# Patient Record
Sex: FEMALE | Race: WHITE | NOT HISPANIC OR LATINO | Employment: FULL TIME | URBAN - METROPOLITAN AREA
[De-identification: names, ages, dates, MRNs, and addresses within clinical notes are randomized per-mention and may not be internally consistent; named-entity substitution may affect disease eponyms.]

---

## 2023-09-25 PROBLEM — D83.9: Status: ACTIVE | Noted: 2023-09-25

## 2023-09-25 PROBLEM — L40.0 PLAQUE PSORIASIS: Status: ACTIVE | Noted: 2023-09-25

## 2023-09-25 PROBLEM — E66.9 OBESITY: Status: ACTIVE | Noted: 2023-09-25

## 2023-09-25 PROBLEM — L40.50 ARTHRITIS WITH PSORIASIS (MULTI): Status: ACTIVE | Noted: 2023-09-25

## 2023-09-25 PROBLEM — M05.79 SEROPOSITIVE RHEUMATOID ARTHRITIS OF MULTIPLE SITES (MULTI): Status: ACTIVE | Noted: 2023-09-25

## 2023-09-25 PROBLEM — R60.9 LIPOEDEMA: Status: ACTIVE | Noted: 2023-09-25

## 2023-09-25 PROBLEM — R60.9 EDEMA: Status: ACTIVE | Noted: 2023-09-25

## 2023-09-25 PROBLEM — G62.9 NEUROPATHY: Status: ACTIVE | Noted: 2023-09-25

## 2023-09-25 PROBLEM — E55.9 VITAMIN D DEFICIENCY: Status: ACTIVE | Noted: 2023-09-25

## 2023-09-25 RX ORDER — CYCLOBENZAPRINE HCL 10 MG
10 TABLET ORAL
COMMUNITY
Start: 2023-07-05

## 2023-09-25 RX ORDER — ABATACEPT 125 MG/ML
INJECTION, SOLUTION SUBCUTANEOUS
COMMUNITY
Start: 2023-03-07 | End: 2023-10-26 | Stop reason: SDUPTHER

## 2023-09-25 RX ORDER — GUSELKUMAB 100 MG/ML
INJECTION SUBCUTANEOUS
COMMUNITY
Start: 2022-06-14 | End: 2023-10-13 | Stop reason: WASHOUT

## 2023-09-25 RX ORDER — FUROSEMIDE 20 MG/1
TABLET ORAL 3 TIMES DAILY PRN
COMMUNITY

## 2023-09-25 RX ORDER — HYDROXYCHLOROQUINE SULFATE 200 MG/1
TABLET, FILM COATED ORAL
COMMUNITY
Start: 2022-01-24 | End: 2023-10-26 | Stop reason: SDUPTHER

## 2023-09-25 RX ORDER — CHOLECALCIFEROL (VITAMIN D3) 125 MCG
CAPSULE ORAL
COMMUNITY
End: 2023-10-13 | Stop reason: ALTCHOICE

## 2023-09-25 RX ORDER — ALBUTEROL SULFATE 90 UG/1
AEROSOL, METERED RESPIRATORY (INHALATION)
COMMUNITY
End: 2023-11-14 | Stop reason: SDUPTHER

## 2023-09-25 RX ORDER — FOLIC ACID 1 MG/1
TABLET ORAL
COMMUNITY
Start: 2022-10-27

## 2023-09-25 RX ORDER — LIDOCAINE 50 MG/G
PATCH TOPICAL AS NEEDED
COMMUNITY
Start: 2023-07-05

## 2023-09-25 RX ORDER — PRIMIDONE 50 MG/1
TABLET ORAL
COMMUNITY
End: 2023-10-13 | Stop reason: ALTCHOICE

## 2023-09-25 RX ORDER — APREMILAST 30 MG/1
TABLET, FILM COATED ORAL
COMMUNITY
Start: 2022-04-29 | End: 2023-10-13 | Stop reason: SDUPTHER

## 2023-09-25 RX ORDER — SPIRONOLACTONE 25 MG/1
1 TABLET ORAL DAILY
COMMUNITY
End: 2023-10-13 | Stop reason: WASHOUT

## 2023-09-25 RX ORDER — PREDNISONE 10 MG/1
TABLET ORAL
COMMUNITY
End: 2023-10-13 | Stop reason: ALTCHOICE

## 2023-09-25 RX ORDER — CYPROHEPTADINE HYDROCHLORIDE 4 MG/1
TABLET ORAL
COMMUNITY
Start: 2014-04-16 | End: 2023-10-13 | Stop reason: WASHOUT

## 2023-09-25 RX ORDER — POTASSIUM CHLORIDE 750 MG/1
10 CAPSULE, EXTENDED RELEASE ORAL
COMMUNITY

## 2023-09-25 RX ORDER — METHYLPREDNISOLONE ACETATE 40 MG/ML
INJECTION, SUSPENSION INTRA-ARTICULAR; INTRALESIONAL; INTRAMUSCULAR; SOFT TISSUE
COMMUNITY
Start: 2014-04-16 | End: 2023-10-13 | Stop reason: ALTCHOICE

## 2023-09-25 RX ORDER — ERGOCALCIFEROL 1.25 MG/1
CAPSULE ORAL
COMMUNITY
Start: 2022-01-24 | End: 2023-10-26 | Stop reason: SDUPTHER

## 2023-09-25 RX ORDER — POTASSIUM &MAGNESIUM ASPARTATE 250-250 MG
CAPSULE ORAL DAILY PRN
COMMUNITY
Start: 2022-07-05 | End: 2023-10-13 | Stop reason: WASHOUT

## 2023-10-12 ENCOUNTER — HOSPITAL ENCOUNTER (EMERGENCY)
Facility: HOSPITAL | Age: 53
Discharge: HOME | End: 2023-10-12
Payer: COMMERCIAL

## 2023-10-12 VITALS
TEMPERATURE: 98.2 F | RESPIRATION RATE: 16 BRPM | OXYGEN SATURATION: 97 % | DIASTOLIC BLOOD PRESSURE: 74 MMHG | HEART RATE: 80 BPM | WEIGHT: 247.9 LBS | HEIGHT: 65 IN | SYSTOLIC BLOOD PRESSURE: 113 MMHG | BODY MASS INDEX: 41.3 KG/M2

## 2023-10-12 DIAGNOSIS — S81.811A SKIN TEAR OF RIGHT LOWER LEG WITHOUT COMPLICATION, INITIAL ENCOUNTER: Primary | ICD-10-CM

## 2023-10-12 PROCEDURE — 99282 EMERGENCY DEPT VISIT SF MDM: CPT

## 2023-10-12 ASSESSMENT — LIFESTYLE VARIABLES
HAVE PEOPLE ANNOYED YOU BY CRITICIZING YOUR DRINKING: NO
EVER HAD A DRINK FIRST THING IN THE MORNING TO STEADY YOUR NERVES TO GET RID OF A HANGOVER: NO
HAVE YOU EVER FELT YOU SHOULD CUT DOWN ON YOUR DRINKING: NO
EVER FELT BAD OR GUILTY ABOUT YOUR DRINKING: NO
REASON UNABLE TO ASSESS: NO

## 2023-10-12 ASSESSMENT — COLUMBIA-SUICIDE SEVERITY RATING SCALE - C-SSRS
2. HAVE YOU ACTUALLY HAD ANY THOUGHTS OF KILLING YOURSELF?: NO
6. HAVE YOU EVER DONE ANYTHING, STARTED TO DO ANYTHING, OR PREPARED TO DO ANYTHING TO END YOUR LIFE?: NO
1. IN THE PAST MONTH, HAVE YOU WISHED YOU WERE DEAD OR WISHED YOU COULD GO TO SLEEP AND NOT WAKE UP?: NO

## 2023-10-12 ASSESSMENT — PAIN - FUNCTIONAL ASSESSMENT: PAIN_FUNCTIONAL_ASSESSMENT: 0-10

## 2023-10-12 ASSESSMENT — PAIN SCALES - GENERAL: PAINLEVEL_OUTOF10: 9

## 2023-10-12 ASSESSMENT — PAIN DESCRIPTION - LOCATION: LOCATION: LEG

## 2023-10-13 ENCOUNTER — OFFICE VISIT (OUTPATIENT)
Dept: PRIMARY CARE | Facility: CLINIC | Age: 53
End: 2023-10-13
Payer: COMMERCIAL

## 2023-10-13 VITALS
SYSTOLIC BLOOD PRESSURE: 112 MMHG | TEMPERATURE: 97.5 F | DIASTOLIC BLOOD PRESSURE: 82 MMHG | HEART RATE: 67 BPM | BODY MASS INDEX: 42.02 KG/M2 | WEIGHT: 252.2 LBS | OXYGEN SATURATION: 98 % | HEIGHT: 65 IN

## 2023-10-13 DIAGNOSIS — S81.811A SKIN TEAR OF LOWER LEG WITHOUT COMPLICATION, RIGHT, INITIAL ENCOUNTER: Primary | ICD-10-CM

## 2023-10-13 PROCEDURE — 1036F TOBACCO NON-USER: CPT | Performed by: FAMILY MEDICINE

## 2023-10-13 PROCEDURE — 99213 OFFICE O/P EST LOW 20 MIN: CPT | Performed by: FAMILY MEDICINE

## 2023-10-13 ASSESSMENT — PAIN SCALES - GENERAL: PAINLEVEL: 6

## 2023-10-13 NOTE — PROGRESS NOTES
Outpatient Visit Note    Chief Complaint   Patient presents with    ER Follow-up     Skin tare.         HPI:  Sue Schafer is a 53 y.o. female with a past medical history significant for rheumatoid arthritis with plaque psoriasis and neuropathy along with vitamin D deficiency followed diligently by Dr. Leiva of rheumatology who presents to the office for ER follow-up.  She was last seen in the office on 9/15/2023 via telemedicine encounter secondary to concerns for sinus infection, at which time she was treated with antibiotics.  Augmentin.    Review of chart notes that patient presented to the [facility] emergency department on 10/12/2023 for evaluation of injury to right shin.  Stated to have bumped her leg into something causing the skin to split open on her shin.  States to have normally covered up skin tears but was concerned as injury seemed to be bleeding significantly.  She denied any other injury/trauma.  There was no reported chest pain, shortness of breath, fever, chills, nausea, vomiting, abdominal pain, recent travel or recent illness beyond her above-mentioned sinus infection.  Confirmed that last tetanus shot was within the past 5 years.  Physical exam showed stable vitals with patient in no acute distress.  They did appreciate a V-shaped skin tear on the anterior aspect of her right shin with control bleeding.  Area was cleansed with bacitracin and Steri-Strips applied.  She was ultimately discharged with recommendations for PCP follow-up.    She reports Steri-Strips to have come off with initial bathing when returning home from ER though she was given extra supplies and put these on.  Has been covering with a bandage with no recurrent bleeding or drainage.  Area remains sore.  Denies any other systemic complaints such as fever, chills.    Current Medications  Current Outpatient Medications   Medication Instructions    abatacept (Orencia ClickJect) 125 mg/mL auto-injector  auto-injection as directed Subcutaneous INJECT 125 MG SUB Q EVERY WEEK for 30 days    abatacept (ORENCIA) 125 mg/mL auto-injector auto-injection INJECT 125 MG (1 PEN) UNDER THE SKIN EVERY WEEK AS DIRECTED    albuterol (Ventolin HFA) 90 mcg/actuation inhaler 1-2 puff as needed Inhalation every 4 hrs for 30 day(s)    apremilast (Otezla) 30 mg tablet TAKE ONE (1) TABLET BY MOUTH TWICE DAILY    cetirizine (ZyrTEC) 10 mg capsule TAKE 1 CAPSULE Daily    cyclobenzaprine (Flexeril) 10 mg tablet 1 tablet at bedtime as needed Orally Once a day for 30 day(s)    ergocalciferol (Vitamin D-2) 1.25 MG (40327 UT) capsule 1 capsule po weekly for 90 days    folic acid (Folvite) 1 mg tablet 1 tablet Orally Once a day for 90 days    furosemide (Lasix) 20 mg tablet 3 times a day as needed.    hydroxychloroquine (PlaqueniL) 200 mg tablet 1 tablet Orally twice daily for 90 days    lidocaine (Lidoderm) 5 % patch 1 patch remove after 12 hours Externally Once a day for 30 days    potassium chloride ER (Micro-K) 10 mEq ER capsule Potassium Chloride ER 10 MEQ Oral Capsule Extended Release   Refills: 0       Active    raNITIdine (Zantac) 75 mg tablet 1 tablet, oral, Every 12 hours        Allergies  No Known Allergies     History reviewed. No pertinent past medical history.   Past Surgical History:   Procedure Laterality Date    HYSTERECTOMY  01/21/2014    Hysterectomy     Family History   Problem Relation Name Age of Onset    Diabetes Mother      Heart disease Mother      Rosacea Mother      Stroke Father      Multiple sclerosis Father      Other (PMR) Sister      Fibromyalgia Sister      Ovarian cancer Sister      Breast cancer Sister      Psoriasis Sister      No Known Problems Brother      No Known Problems Brother      No Known Problems Brother      No Known Problems Daughter      Other (Mental Health) Son      Breast cancer Other          grandmother     Social History     Tobacco Use    Smoking status: Never    Smokeless tobacco: Never    Substance Use Topics    Alcohol use: Never       ROS  All pertinent positive symptoms are included in the history of present illness.  All other systems have been reviewed and are negative and noncontributory to this patient's current ailments.    VITAL SIGNS  Vitals:    10/13/23 0956   BP: 112/82   Pulse: 67   Temp: 36.4 °C (97.5 °F)   SpO2: 98%       PHYSICAL EXAM  GENERAL APPEARANCE: alert and oriented, Pleasant and cooperative, No Acute Distress  HEENT: EOMI, PERRLA, MMM  HEART: RRR, normal S1S2, no murmurs, click or rubs  LUNGS: clear to auscultation bilaterally, no wheezes/rhonchi/rales  EXTREMITIES: no edema, normal ROM  SKIN: V-shaped skin tear on anterior right shin well approximated with Steri-Strips, no active bleeding/drainage, no surrounding erythema/edema  NEUROLOGIC EXAM: non-focal exam  MUSCULOSKELETAL: no gross abnormalities  PSYCH: affect is normal, eye contact is good      Assessment/Plan   Problem List Items Addressed This Visit             ICD-10-CM    Skin tear of lower leg without complication, right, initial encounter - Primary S81.811A     - Emergency department documentation reviewed and discussed  -Physical exam reassuring with recommendations to cleanse area at least once a day with warm water and mild soap  -Continue to monitor for signs of secondary infection such as redness, swelling, increased pain or drainage  -If this develops, please contact office and we can initiate oral antibiotic course

## 2023-10-13 NOTE — ASSESSMENT & PLAN NOTE
- Emergency department documentation reviewed and discussed  -Physical exam reassuring with recommendations to cleanse area at least once a day with warm water and mild soap  -Continue to monitor for signs of secondary infection such as redness, swelling, increased pain or drainage  -If this develops, please contact office and we can initiate oral antibiotic course

## 2023-10-13 NOTE — PATIENT INSTRUCTIONS
Problem List Items Addressed This Visit             ICD-10-CM    Skin tear of lower leg without complication, right, initial encounter - Primary S81.811A     - Emergency department documentation reviewed and discussed  -Physical exam reassuring with recommendations to cleanse area at least once a day with warm water and mild soap  -Continue to monitor for signs of secondary infection such as redness, swelling, increased pain or drainage  -If this develops, please contact office and we can initiate oral antibiotic course

## 2023-10-13 NOTE — ED PROVIDER NOTES
HPI   Chief Complaint   Patient presents with   • Laceration     Pt states that she was walking to her bedroom and hit her leg on a wooden box causing a laceration        Patient is a 53-year-old female presenting to the emergency department for evaluation of laceration to the right shin.  Patient states she bumped her leg on something and it split her shin open.  She normally just covers up her skin tears but was concerned about this particular cut because it was bleeding a lot.  She denies any other injuries.  She denies chest pain, shortness of breath, fever, chills, nausea, vomiting, abdominal pain, recent travel, recent illness.  She states her tetanus was updated less than 5 years ago.                          No data recorded                Patient History   History reviewed. No pertinent past medical history.  Past Surgical History:   Procedure Laterality Date   • HYSTERECTOMY  01/21/2014    Hysterectomy     Family History   Problem Relation Name Age of Onset   • Diabetes Mother     • Heart disease Mother     • Rosacea Mother     • Stroke Father     • Multiple sclerosis Father     • Other (PMR) Sister     • Fibromyalgia Sister     • Ovarian cancer Sister     • Breast cancer Sister     • Psoriasis Sister     • No Known Problems Brother     • No Known Problems Brother     • No Known Problems Brother     • No Known Problems Daughter     • Other (Mental Health) Son     • Breast cancer Other          grandmother     Social History     Tobacco Use   • Smoking status: Never   • Smokeless tobacco: Never   Substance Use Topics   • Alcohol use: Never   • Drug use: Not on file       Physical Exam   ED Triage Vitals [10/12/23 2158]   Temp Heart Rate Resp BP   36.8 °C (98.2 °F) 80 16 113/74      SpO2 Temp Source Heart Rate Source Patient Position   97 % Oral Monitor Sitting      BP Location FiO2 (%)     Right arm --       Physical Exam  GENERAL APPEARANCE: This patient is in no acute respiratory distress. Awake and  alert. Talking appropriately. Answering questions appropriately. No evidence of pressured speech.    VITAL SIGNS: As per the nurses' triage record.    HEENT: Normocephalic, atraumatic.    NECK:  full gross ROM during exam    MUSCULOSKELETAL:  Full gross active range of motion. Ambulating on own with no acute difficulties    NEUROLOGICAL: Awake, alert and oriented x 3.    IMMUNOLOGICAL: No palpable lymphadenopathy or lymphatic streaking noted on visible skin.    DERM: Skin tear to the front of the shin and a V-shaped approximately 3 cm in length.  It is very superficial and no sutures are needed.    PSYCH: mood and affect appear normal.    ED Course & MDM   Diagnoses as of 10/12/23 2234   Skin tear of right lower leg without complication, initial encounter       Medical Decision Making  Parts of this chart have been completed using voice recognition software. Please excuse any errors of transcription. Despite the medical decision making time stamp above-my medical decision making has taken place during the patient's entire visit. My thought process and reason for plan has been formulated from the time that I saw the patient until the time of disposition and is not specific to one specific moment during their visit and furthermore my MDM encompasses this entire chart and not only this text box.    Evaluated this patient independently and my supervising physician was available for consultation.    HPI: Detailed above.    Exam: A medically appropriate exam performed, outlined above, given the known history and presentation.    History obtained from: Patient    Social Determinants of Health considered during this visit: Lives at home    Considerations/further MDM:  Patient is a 53-year-old female presenting to the emergency department for evaluation of a skin tear to the right shin.  On physical exam vital signs are stable and patient is in no acute distress.  She has a V shaped skin tear anterior shin with bleeding  controlled.  Bacitracin and Steri-Strips were placed as it is very superficial and no sutures are needed.  Patient will follow-up with PCP within the next 1 to 2 days.  She will return to the emergency department any new or worsening symptoms.    Procedure  Laceration Repair    Performed by: Angelica Barraza PA-C  Authorized by: Angelica Barraza PA-C    Consent:     Consent obtained:  Verbal    Consent given by:  Patient    Risks, benefits, and alternatives were discussed: yes      Risks discussed:  Infection and pain    Alternatives discussed:  No treatment  Universal protocol:     Patient identity confirmed:  Verbally with patient  Laceration details:     Location:  Leg    Leg location:  R lower leg    Length (cm):  3  Exploration:     Hemostasis achieved with:  Direct pressure  Skin repair:     Repair method:  Steri-Strips    Number of Steri-Strips:  3  Approximation:     Approximation:  Close  Repair type:     Repair type:  Simple  Post-procedure details:     Procedure completion:  Tolerated       Angelica Barraza PA-C  10/12/23 9324

## 2023-10-14 ENCOUNTER — LAB (OUTPATIENT)
Dept: LAB | Facility: LAB | Age: 53
End: 2023-10-14
Payer: COMMERCIAL

## 2023-10-14 DIAGNOSIS — R73.9 HYPERGLYCEMIA, UNSPECIFIED: Primary | ICD-10-CM

## 2023-10-14 DIAGNOSIS — M05.79 RHEUMATOID ARTHRITIS WITH RHEUMATOID FACTOR OF MULTIPLE SITES WITHOUT ORGAN OR SYSTEMS INVOLVEMENT (MULTI): ICD-10-CM

## 2023-10-14 DIAGNOSIS — Z13.220 ENCOUNTER FOR SCREENING FOR LIPOID DISORDERS: ICD-10-CM

## 2023-10-14 DIAGNOSIS — R53.83 OTHER FATIGUE: ICD-10-CM

## 2023-10-14 DIAGNOSIS — R25.1 TREMOR, UNSPECIFIED: ICD-10-CM

## 2023-10-14 DIAGNOSIS — Z79.899 OTHER LONG TERM (CURRENT) DRUG THERAPY: ICD-10-CM

## 2023-10-14 DIAGNOSIS — L30.9 DERMATITIS, UNSPECIFIED: ICD-10-CM

## 2023-10-14 DIAGNOSIS — E55.9 VITAMIN D DEFICIENCY, UNSPECIFIED: ICD-10-CM

## 2023-10-14 LAB
25(OH)D3 SERPL-MCNC: 27 NG/ML (ref 31–100)
ALBUMIN SERPL-MCNC: 4.2 G/DL (ref 3.5–5)
ALP BLD-CCNC: 103 U/L (ref 35–125)
ALT SERPL-CCNC: 19 U/L (ref 5–40)
ANION GAP SERPL CALC-SCNC: 11 MMOL/L
AST SERPL-CCNC: 16 U/L (ref 5–40)
BASOPHILS # BLD AUTO: 0.04 X10*3/UL (ref 0–0.1)
BASOPHILS NFR BLD AUTO: 0.5 %
BILIRUB SERPL-MCNC: 0.3 MG/DL (ref 0.1–1.2)
BUN SERPL-MCNC: 13 MG/DL (ref 8–25)
C3 SERPL-MCNC: 158 MG/DL (ref 87–200)
C4 SERPL-MCNC: 22 MG/DL (ref 10–50)
CALCIUM SERPL-MCNC: 9.1 MG/DL (ref 8.5–10.4)
CHLORIDE SERPL-SCNC: 103 MMOL/L (ref 97–107)
CHOLEST SERPL-MCNC: 201 MG/DL (ref 133–200)
CHOLEST/HDLC SERPL: 3.4 {RATIO}
CK SERPL-CCNC: 49 U/L (ref 24–195)
CO2 SERPL-SCNC: 27 MMOL/L (ref 24–31)
CREAT SERPL-MCNC: 0.7 MG/DL (ref 0.4–1.6)
CRP SERPL-MCNC: 0.6 MG/DL (ref 0–2)
EOSINOPHIL # BLD AUTO: 0.11 X10*3/UL (ref 0–0.7)
EOSINOPHIL NFR BLD AUTO: 1.5 %
ERYTHROCYTE [DISTWIDTH] IN BLOOD BY AUTOMATED COUNT: 13.2 % (ref 11.5–14.5)
ERYTHROCYTE [SEDIMENTATION RATE] IN BLOOD BY WESTERGREN METHOD: 26 MM/H (ref 0–30)
EST. AVERAGE GLUCOSE BLD GHB EST-MCNC: 128 MG/DL
GFR SERPL CREATININE-BSD FRML MDRD: >90 ML/MIN/1.73M*2
GLUCOSE SERPL-MCNC: 102 MG/DL (ref 65–99)
HBA1C MFR BLD: 6.1 %
HCT VFR BLD AUTO: 42.5 % (ref 36–46)
HDLC SERPL-MCNC: 59 MG/DL
HGB BLD-MCNC: 13.6 G/DL (ref 12–16)
IMM GRANULOCYTES # BLD AUTO: 0.01 X10*3/UL (ref 0–0.7)
IMM GRANULOCYTES NFR BLD AUTO: 0.1 % (ref 0–0.9)
LDLC SERPL CALC-MCNC: 127 MG/DL (ref 65–130)
LYMPHOCYTES # BLD AUTO: 3.16 X10*3/UL (ref 1.2–4.8)
LYMPHOCYTES NFR BLD AUTO: 42.5 %
MCH RBC QN AUTO: 29.1 PG (ref 26–34)
MCHC RBC AUTO-ENTMCNC: 32 G/DL (ref 32–36)
MCV RBC AUTO: 91 FL (ref 80–100)
MONOCYTES # BLD AUTO: 0.51 X10*3/UL (ref 0.1–1)
MONOCYTES NFR BLD AUTO: 6.9 %
NEUTROPHILS # BLD AUTO: 3.6 X10*3/UL (ref 1.2–7.7)
NEUTROPHILS NFR BLD AUTO: 48.5 %
NRBC BLD-RTO: 0 /100 WBCS (ref 0–0)
PLATELET # BLD AUTO: 272 X10*3/UL (ref 150–450)
PMV BLD AUTO: 10 FL (ref 7.5–11.5)
POTASSIUM SERPL-SCNC: 4.6 MMOL/L (ref 3.4–5.1)
PROT SERPL-MCNC: 6.6 G/DL (ref 6.4–8.2)
PROT SERPL-MCNC: 6.7 G/DL (ref 5.9–7.9)
PROT SERPL-MCNC: 6.7 G/DL (ref 6.4–8.2)
RBC # BLD AUTO: 4.68 X10*6/UL (ref 4–5.2)
SODIUM SERPL-SCNC: 141 MMOL/L (ref 133–145)
TRIGL SERPL-MCNC: 73 MG/DL (ref 40–150)
TSH SERPL DL<=0.05 MIU/L-ACNC: 1.58 MIU/L (ref 0.27–4.2)
VIT B12 SERPL-MCNC: 421 PG/ML (ref 211–946)
WBC # BLD AUTO: 7.4 X10*3/UL (ref 4.4–11.3)

## 2023-10-14 PROCEDURE — 86332 IMMUNE COMPLEX ASSAY: CPT

## 2023-10-14 PROCEDURE — 83036 HEMOGLOBIN GLYCOSYLATED A1C: CPT

## 2023-10-14 PROCEDURE — 84155 ASSAY OF PROTEIN SERUM: CPT

## 2023-10-14 PROCEDURE — 84443 ASSAY THYROID STIM HORMONE: CPT

## 2023-10-14 PROCEDURE — 82550 ASSAY OF CK (CPK): CPT

## 2023-10-14 PROCEDURE — 85652 RBC SED RATE AUTOMATED: CPT

## 2023-10-14 PROCEDURE — 82607 VITAMIN B-12: CPT

## 2023-10-14 PROCEDURE — 83529 ASAY OF INTERLEUKIN-6 (IL-6): CPT

## 2023-10-14 PROCEDURE — 80053 COMPREHEN METABOLIC PANEL: CPT

## 2023-10-14 PROCEDURE — 86160 COMPLEMENT ANTIGEN: CPT

## 2023-10-14 PROCEDURE — 80061 LIPID PANEL: CPT

## 2023-10-14 PROCEDURE — 85025 COMPLETE CBC W/AUTO DIFF WBC: CPT

## 2023-10-14 PROCEDURE — 86318 IA INFECTIOUS AGENT ANTIBODY: CPT

## 2023-10-14 PROCEDURE — 82306 VITAMIN D 25 HYDROXY: CPT

## 2023-10-14 PROCEDURE — 86140 C-REACTIVE PROTEIN: CPT

## 2023-10-14 PROCEDURE — 84207 ASSAY OF VITAMIN B-6: CPT

## 2023-10-14 PROCEDURE — 86225 DNA ANTIBODY NATIVE: CPT

## 2023-10-14 PROCEDURE — 36415 COLL VENOUS BLD VENIPUNCTURE: CPT

## 2023-10-14 PROCEDURE — 89240 UNLISTED MISC PATH TEST: CPT | Performed by: INTERNAL MEDICINE

## 2023-10-15 LAB
DSDNA AB SER-ACNC: <1 IU/ML
RPR SER QL: NONREACTIVE

## 2023-10-18 ENCOUNTER — TELEPHONE (OUTPATIENT)
Dept: PRIMARY CARE | Facility: CLINIC | Age: 53
End: 2023-10-18
Payer: COMMERCIAL

## 2023-10-18 DIAGNOSIS — L03.115 CELLULITIS OF RIGHT LOWER EXTREMITY: Primary | ICD-10-CM

## 2023-10-18 LAB
IL6 SERPL-MCNC: <2 PG/ML
PYRIDOXAL PHOS SERPL-SCNC: 20.6 NMOL/L (ref 20–125)

## 2023-10-18 RX ORDER — SULFAMETHOXAZOLE AND TRIMETHOPRIM 800; 160 MG/1; MG/1
1 TABLET ORAL 2 TIMES DAILY
Qty: 20 TABLET | Refills: 0 | Status: SHIPPED | OUTPATIENT
Start: 2023-10-18 | End: 2023-10-26 | Stop reason: ALTCHOICE

## 2023-10-18 NOTE — TELEPHONE ENCOUNTER
pt came to office stating she has sent a msg to pcp via my chart, pt states she is in need of an atb, due to cut on leg, was told to watch, now is warm to touch and the redness area has spread. Pt states she did send a pic via my chart along with msg.

## 2023-10-18 NOTE — TELEPHONE ENCOUNTER
Secondary to concerns for soft tissue infection, Bactrim DS sent to pharmacy to be taken with food as directed.  Encourage patient to continue watching for any worsening/progressive/persistent signs and symptoms

## 2023-10-24 ENCOUNTER — PHARMACY VISIT (OUTPATIENT)
Dept: PHARMACY | Facility: CLINIC | Age: 53
End: 2023-10-24
Payer: COMMERCIAL

## 2023-10-24 ENCOUNTER — SPECIALTY PHARMACY (OUTPATIENT)
Dept: PHARMACY | Facility: CLINIC | Age: 53
End: 2023-10-24

## 2023-10-24 LAB
C1Q SERPL-MCNC: 14.2 MG/DL (ref 10.3–20.5)
C1Q SERPL-MCNC: 15.7 MG/DL (ref 10.3–20.5)
IC SERPL C1Q BIND-ACNC: 1.4 UG EQ/ML
IC SERPL RAJI CELL-ACNC: 5.4 UG EQ/ML
SCAN RESULT: NORMAL

## 2023-10-24 PROCEDURE — RXMED WILLOW AMBULATORY MEDICATION CHARGE

## 2023-10-24 RX ORDER — CEPHALEXIN 500 MG/1
500 CAPSULE ORAL 2 TIMES DAILY
Qty: 14 CAPSULE | Refills: 0 | Status: SHIPPED | OUTPATIENT
Start: 2023-10-24 | End: 2023-10-31

## 2023-10-24 NOTE — TELEPHONE ENCOUNTER
Patient states area is still red around the surounding skin. Patient wants to know if he should send PCP another picture of area via [employer]. Patient states she does have appmt with Rhematologist Dr. Leiva next Thursday. Please advise

## 2023-10-24 NOTE — TELEPHONE ENCOUNTER
I am more than happy to have a look at the wound if patient would like to send a picture.  Regardless, secondary to nature of wound and ongoing redness, I will send an additional alternative antibiotic course in the form of Keflex 500 mg twice a day for the next 7 days to take with food as tolerated.  If patient continues to have ongoing concerns can set up follow-up appointment or coordinate with wound care

## 2023-10-25 PROBLEM — J32.1 CHRONIC FRONTAL SINUSITIS: Status: ACTIVE | Noted: 2023-10-25

## 2023-10-25 RX ORDER — BENZONATATE 100 MG/1
100 CAPSULE ORAL 3 TIMES DAILY PRN
COMMUNITY
Start: 2023-09-15 | End: 2023-10-26 | Stop reason: ALTCHOICE

## 2023-10-25 RX ORDER — METHYLPREDNISOLONE 4 MG/1
TABLET ORAL
COMMUNITY
Start: 2023-06-24 | End: 2023-10-26 | Stop reason: ALTCHOICE

## 2023-10-25 RX ORDER — ACETAMINOPHEN 500 MG
500 TABLET ORAL EVERY 8 HOURS PRN
COMMUNITY
End: 2024-01-18 | Stop reason: WASHOUT

## 2023-10-26 ENCOUNTER — OFFICE VISIT (OUTPATIENT)
Dept: RHEUMATOLOGY | Facility: CLINIC | Age: 53
End: 2023-10-26
Payer: COMMERCIAL

## 2023-10-26 VITALS
BODY MASS INDEX: 40.29 KG/M2 | WEIGHT: 241.84 LBS | HEART RATE: 62 BPM | OXYGEN SATURATION: 98 % | SYSTOLIC BLOOD PRESSURE: 116 MMHG | DIASTOLIC BLOOD PRESSURE: 64 MMHG | HEIGHT: 65 IN

## 2023-10-26 DIAGNOSIS — M05.79 SEROPOSITIVE RHEUMATOID ARTHRITIS OF MULTIPLE SITES (MULTI): Primary | ICD-10-CM

## 2023-10-26 DIAGNOSIS — G62.9 NEUROPATHY: ICD-10-CM

## 2023-10-26 DIAGNOSIS — R76.8 POSITIVE ANA (ANTINUCLEAR ANTIBODY): ICD-10-CM

## 2023-10-26 DIAGNOSIS — L40.0 PLAQUE PSORIASIS: ICD-10-CM

## 2023-10-26 DIAGNOSIS — E55.9 VITAMIN D DEFICIENCY: ICD-10-CM

## 2023-10-26 PROCEDURE — 1036F TOBACCO NON-USER: CPT | Performed by: INTERNAL MEDICINE

## 2023-10-26 PROCEDURE — 99213 OFFICE O/P EST LOW 20 MIN: CPT | Performed by: INTERNAL MEDICINE

## 2023-10-26 RX ORDER — ERGOCALCIFEROL 1.25 MG/1
CAPSULE ORAL
Qty: 12 CAPSULE | Refills: 3 | Status: SHIPPED | OUTPATIENT
Start: 2023-10-26 | End: 2024-05-30 | Stop reason: SDUPTHER

## 2023-10-26 RX ORDER — HYDROXYCHLOROQUINE SULFATE 200 MG/1
TABLET, FILM COATED ORAL
Qty: 180 TABLET | Refills: 3 | Status: SHIPPED | OUTPATIENT
Start: 2023-10-26

## 2023-10-26 ASSESSMENT — PAIN SCALES - GENERAL: PAINLEVEL_OUTOF10: 5 - MODERATE PAIN

## 2023-10-26 ASSESSMENT — ROUTINE ASSESSMENT OF PATIENT INDEX DATA (RAPID3)
IN_OUT_TRANSPORT: WITH SOME DIFFICULTY
TOTAL RAPID3 SCORE: 14.7
FEELINGS_DEPRESSION: WITHOUT ANY DIFFICULTY
ON A SCALE OF ONE TO TEN, CONSIDERING ALL THE WAYS IN WHICH ILLNESS AND HEALTH CONDITIONS MAY AFFECT YOU AT THIS TIME, PLEASE INDICATE BELOW HOW YOU ARE DOING:: 7
GOOD_NIGHTS_SLEEP: WITHOUT ANY DIFFICULTY
TURN_FAUCETS_OFF: WITHOUT ANY DIFFICULTY
LIFT_CUP_TO_MOUTH: WITHOUT ANY DIFFICULTY
ON A SCALE OF ONE TO TEN, CONSIDERING ALL THE WAYS IN WHICH ILLNESS AND HEALTH CONDITIONS MAY AFFECT YOU AT THIS TIME, PLEASE INDICATE BELOW HOW YOU ARE DOING:: 7
ON A SCALE OF ONE TO TEN, HOW MUCH PAIN HAVE YOU HAD BECAUSE OF YOUR CONDITION OVER THE PAST WEEK?: 5
PICK_CLOTHES_OFF_FLOOR: WITHOUT ANY DIFFICULTY
PARTIPATE_RECREATIONAL_ACTIVITIES: WITH MUCH DIFFICULTY
FEELINGS_ANXIETY_NERVOUS: WITHOUT ANY DIFFICULTY
SUM OF QUESTIONS A TO J: 8
WASH_DRY_BODY: WITHOUT ANY DIFFICULTY
IN_OUT_BED: WITH SOME DIFFICULTY
FN_SCORE: 2.7
WALK_FLAT_GROUND: WITH SOME DIFFICULTY
ON A SCALE OF ONE TO TEN, HOW MUCH PAIN HAVE YOU HAD BECAUSE OF YOUR CONDITION OVER THE PAST WEEK?: 5
DRESS_YOURSELF: WITH SOME DIFFICULTY
WEIGHTED_TOTAL_SCORE: 4.9
SEVERITY_SCORE: 0
WALK_KILOMETERS: WITH MUCH DIFFICULTY

## 2023-10-26 ASSESSMENT — PATIENT HEALTH QUESTIONNAIRE - PHQ9
1. LITTLE INTEREST OR PLEASURE IN DOING THINGS: NOT AT ALL
2. FEELING DOWN, DEPRESSED OR HOPELESS: NOT AT ALL
SUM OF ALL RESPONSES TO PHQ9 QUESTIONS 1 AND 2: 0

## 2023-10-26 ASSESSMENT — PAIN - FUNCTIONAL ASSESSMENT: PAIN_FUNCTIONAL_ASSESSMENT: 0-10

## 2023-10-26 ASSESSMENT — ENCOUNTER SYMPTOMS
OCCASIONAL FEELINGS OF UNSTEADINESS: 1
LOSS OF SENSATION IN FEET: 0
DEPRESSION: 0

## 2023-10-26 NOTE — PROGRESS NOTES
[facility]/  Rheumatology  [address], [address]  [place] [address]  Phone: [phone]  Fax: [phone]    Rheumatology Progress Note 10/26/23    Sue Schafer is a 53 y.o. female here for   Chief Complaint   Patient presents with    Rheumatoid Arthritis     Follow up with labs       Last Visit: 3/6/2023    Rheum Hx      Referred by self for knee pain.  Chief complaint: Swollen hands, feet, legs, stiffness  Since February 2019  Slow onset  Intermittent remitting course  Precipitating factors: Fallen eyes  No associated symptoms  Better: Lately nothing  Worse: Walking sitting  Previous treatments:  Naproxen-somewhat helpful  Ibuprofen-somewhat helpful  Acetaminophen-somewhat helpful  Steroids-very helpful- Medrol, no side effects  Occupation: Medical assistant  Hx of fall on ice/knee trauma x21st in 2/2017, then 2/2019  Sometimes locks ups, hand locking at times  Bilateral shoulder, right hip, bilateral knees, bilateral ankle  pain  Swelling of right 5th digit and right leg  Morning stiffness until after hot shower  Hx of sciatica with pregnancy  Review of systems positive for:  Hair loss  MARIE sometimes; tightness more than SOB; last wk R  back/flank discomfort' Dx asthma in past- on inh prn with help  Lower extremity swelling by the end of the day  Sensitivity to the sun- itchy, red, swollen spots  Joint pain swelling stiffness  Weakness hands  Numbness tingling legs  Allergies, seasonal- Zyrtec helps  FHx: cousins x3- SLE  psoriasis- sister  rosacea- ronitter   1/4/2022   C-ANCA (PR3) BY EIA <0.2…    C-ANCA FLOURESCENCE Negative…    P-ANCA (MPO) BY EIA <0.2…    P-ANCA FLOURESCENCE Negative…    KRISTAL Positive…    KRISTAL Titer 1:320…    KRISTAL Pattern Nucleolar…    SSA ANTIBODIES <0.2…    SSB ANTIBODIES <0.2…    Angiotensin 1 Conv 30…    Thyroid Peroxidase (TPO) Antibody <1.0…    Citrulline Antibody, IgG 31… (H)    Centromere Ab Negative…    BULMARO-1 ANTIBODY <0.2…    RNP Antibody  <0.2…         Previous Tx  Previous treatments:  Naproxen-somewhat helpful  Ibuprofen-somewhat helpful  Acetaminophen-somewhat helpful  Steroids-very helpful- Medrol, no side effects  Toradol- very helpful  Otezla- currently on  Orencia- currently on    Health Maintenance  DXA- none  Component      Latest Ref Rng 10/27/2021   TB Result Negative…    Hepatitis B Surface AB POSITIVE…      Component      Latest Ref Rng 1/4/2022   HIV 1/2 Antigen/Antibody Screen with Reflex to Confirmation Non Reactive…    Hiv Interpretation Negative…    Hepatitis B Surface AG      NEG  NEGATIVE    Malignancy Hx- none  Immunization History   Administered Date(s) Administered    Influenza, trivalent, adjuvanted 04/01/2022, 10/28/2022    Annmarie SARS-CoV-2 Vaccination 04/01/2022          Past Medical History:   Diagnosis Date    Asthma     Dermatitis     Lower extremity edema     Plaque psoriasis     Positive KRISTAL (antinuclear antibody)     Seropositive rheumatoid arthritis of multiple sites (CMS/HCC)       Past Surgical History:   Procedure Laterality Date    BLADDER SUSPENSION      BONE BIOPSY  1996    JAW    HYSTERECTOMY  01/21/2014    Hysterectomy    WISDOM TOOTH EXTRACTION  1996      Current Outpatient Medications   Medication Sig Dispense Refill    abatacept (ORENCIA) 125 mg/mL auto-injector auto-injection INJECT 125 MG (1 PEN) UNDER THE SKIN EVERY WEEK AS DIRECTED 4 mL 11    acetaminophen (Tylenol) 500 mg tablet Take 1 tablet (500 mg) by mouth every 8 hours if needed.      apremilast (Otezla) 30 mg tablet TAKE ONE (1) TABLET BY MOUTH TWICE DAILY 60 tablet 11    cetirizine (ZyrTEC) 10 mg capsule TAKE 1 CAPSULE Daily      cyclobenzaprine (Flexeril) 10 mg tablet Take 1 tablet (10 mg) by mouth. PRN      folic acid (Folvite) 1 mg tablet 1 tablet Orally Once a day for 90 days      furosemide (Lasix) 20 mg tablet 3 times a day as needed.      lidocaine (Lidoderm) 5 % patch 1 patch remove after 12 hours Externally Once a day for 30 days       "potassium chloride ER (Micro-K) 10 mEq ER capsule Potassium Chloride ER 10 MEQ Oral Capsule Extended Release   Refills: 0       Active      raNITIdine (Zantac) 75 mg tablet Take 1 tablet (75 mg) by mouth every 12 hours.      albuterol (Ventolin HFA) 90 mcg/actuation inhaler PRN 18 g 3    ergocalciferol (Vitamin D-2) 1.25 MG (95507 UT) capsule 1 capsule po weekly for 90 days 12 capsule 3    hydroxychloroquine (PlaqueniL) 200 mg tablet 1 tablet Orally twice daily for 90 days 180 tablet 3     No current facility-administered medications for this visit.      Allergies   Allergen Reactions    Other Rash     Rash from Adhesive from Band-Aid        Vitals:    10/26/23 1300   BP: 116/64   BP Location: Left arm   Patient Position: Sitting   Pulse: 62   SpO2: 98%   Weight: 110 kg (241 lb 13.5 oz)   Height: 1.651 m (5' 5\")     Pain Assessment Pain Score: 5 - Moderate pain, Pain Location: Shoulder (left shoulder, left hip, right leg)     Rapid 3  Function Score (FN): 2.7  Pain Score (PN) (0-10): 5  Patient Global (PTGL) (0-10): 7  Rapid3 Score: 14.7  RAPID3 Weighted Score: 4.9     Workup  Component      Latest Ref Rn 10/14/2023   WBC      4.4 - 11.3 x10*3/uL 7.4    nRBC      0.0 - 0.0 /100 WBCs 0.0    RBC      4.00 - 5.20 x10*6/uL 4.68    HEMOGLOBIN      12.0 - 16.0 g/dL 13.6    HEMATOCRIT      36.0 - 46.0 % 42.5    MCV      80 - 100 fL 91    MCH      26.0 - 34.0 pg 29.1    MCHC      32.0 - 36.0 g/dL 32.0    RED CELL DISTRIBUTION WIDTH      11.5 - 14.5 % 13.2    Platelets      150 - 450 x10*3/uL 272    MEAN PLATELET VOLUME      7.5 - 11.5 fL 10.0    Neutrophils %      40.0 - 80.0 % 48.5    Immature Granulocytes %, Automated      0.0 - 0.9 % 0.1    Lymphocytes %      13.0 - 44.0 % 42.5    Monocytes %      2.0 - 10.0 % 6.9    Eosinophils %      0.0 - 6.0 % 1.5    Basophils %      0.0 - 2.0 % 0.5    Neutrophils Absolute      1.20 - 7.70 x10*3/uL 3.60    Immature Granulocytes Absolute, Automated      0.00 - 0.70 x10*3/uL 0.01  "   Lymphocytes Absolute      1.20 - 4.80 x10*3/uL 3.16    Monocytes Absolute      0.10 - 1.00 x10*3/uL 0.51    Eosinophils Absolute      0.00 - 0.70 x10*3/uL 0.11    Basophils Absolute      0.00 - 0.10 x10*3/uL 0.04    GLUCOSE      65 - 99 mg/dL 102 (H)    SODIUM      133 - 145 mmol/L 141    POTASSIUM      3.4 - 5.1 mmol/L 4.6    CHLORIDE      97 - 107 mmol/L 103    Bicarbonate      24 - 31 mmol/L 27    Blood Urea Nitrogen      8 - 25 mg/dL 13    Creatinine      0.40 - 1.60 mg/dL 0.70    EGFR      >60 mL/min/1.73m*2 >90    Calcium      8.5 - 10.4 mg/dL 9.1    Albumin      3.5 - 5.0 g/dL 4.2    Alkaline Phosphatase      35 - 125 U/L 103    Total Protein      5.9 - 7.9 g/dL 6.7    Total Protein      6.4 - 8.2 g/dL 6.7    Total Protein      6.4 - 8.2 g/dL 6.6    AST      5 - 40 U/L 16    Bilirubin Total      0.1 - 1.2 mg/dL 0.3    ALT      5 - 40 U/L 19    Anion Gap      <=19 mmol/L 11    CHOLESTEROL      133 - 200 mg/dL 201 (H)    HDL CHOLESTEROL      >50.0 mg/dL 59.0    Cholesterol/HDL Ratio      SEE COMMENT  3.4    LDL Calculated      65 - 130 mg/dL 127    TRIGLYCERIDES      40 - 150 mg/dL 73    Hemoglobin A1C      See below % 6.1 (H)    Estimated Average Glucose      Not Established mg/dL 128    C3d Immune Complexes      ug Eq/mL 5.4    Immune Complexes, C1q Binding      ug Eq/mL 1.4    Thyroid Stimulating Hormone      0.27 - 4.20 mIU/L 1.58    Sed Rate      0 - 30 mm/h 26    RPR       Nonreactive  Nonreactive    Vitamin B12      211 - 946 pg/mL 421    Vitamin B6      20.0 - 125.0 nmol/L 20.6    C1Q Complement      10.3 - 20.5 mg/dL 14.2    C1Q Complement      10.3 - 20.5 mg/dL 15.7    Vitamin D, 25-Hydroxy, Total      31 - 100 ng/mL 27 (L)    Interleukin 6      <=2.0 pg/mL <2.0    C4 Complement      10 - 50 mg/dL 22    C3 Complement      87 - 200 mg/dL 158    Creatine Kinase      24 - 195 U/L 49    C-Reactive Protein      0.00 - 2.00 mg/dL 0.60    Anti-DNA (DS)      <5.0 IU/mL <1.0    Vectra Result 13 (Low)     Assessment/Plan  1. Seropositive rheumatoid arthritis of multiple sites (CMS/HCC)    2. Plaque psoriasis    3. Vitamin D deficiency    4. Positive KRISTAL (antinuclear antibody)    5. Neuropathy       Orders Placed This Encounter   Procedures    Hepatitis C Antibody    CBC and Auto Differential    Comprehensive Metabolic Panel    Creatine Kinase    C-Reactive Protein    Sedimentation Rate    Vitamin D 25-Hydroxy,Total (for eval of Vitamin D levels)    Urinalysis with Reflex Microscopic and Culture    Lyme AB Modified 2-Tier Testing, 1st Tier    C3 Complement    C4 Complement    Anti-DNA Antibody, Double-Stranded    C1Q Complement    Heavy Metals, Blood        Since last appt, adherent and tolerating regimen.   Some help with regimen but still with some joint pain, swelling, and 1-2 hr AM stiffness.   On Bactrim 10/17 to 10/24 due to cellulitis from skin abrasion.  Pain in left hip. Cannot cross leg left to get dressed.   ROS+ fatigue, MARIE, cough, abd pain, suns sens, joint pain/swelling/stiffness, back pain.   Rapid 3 consistent with high severity  Labs reviewed  D/w pt tx options Orencia,  mg, flexerill prn, and replete vit D.  Advised of possible side effects and importance of monitoring including yearly eye exams for HCQ.    All questions answered.  Patient to follow up with primary care provider regarding all other medical issues not addressed today.     Allison Valentine MD      Patient Care Team:  Luis Carlos Mason DO as PCP - General (Family Medicine)  Allison Valentine MD as Consulting Physician (Rheumatology)

## 2023-11-01 ENCOUNTER — TELEPHONE (OUTPATIENT)
Dept: RHEUMATOLOGY | Facility: CLINIC | Age: 53
End: 2023-11-01
Payer: COMMERCIAL

## 2023-11-01 ENCOUNTER — CLINICAL SUPPORT (OUTPATIENT)
Dept: RHEUMATOLOGY | Facility: CLINIC | Age: 53
End: 2023-11-01
Payer: COMMERCIAL

## 2023-11-01 DIAGNOSIS — M05.79 SEROPOSITIVE RHEUMATOID ARTHRITIS OF MULTIPLE SITES (MULTI): Primary | ICD-10-CM

## 2023-11-01 DIAGNOSIS — M05.79 SEROPOSITIVE RHEUMATOID ARTHRITIS OF MULTIPLE SITES (MULTI): ICD-10-CM

## 2023-11-01 PROCEDURE — 2500000004 HC RX 250 GENERAL PHARMACY W/ HCPCS (ALT 636 FOR OP/ED): Performed by: INTERNAL MEDICINE

## 2023-11-01 PROCEDURE — 96372 THER/PROPH/DIAG INJ SC/IM: CPT | Performed by: INTERNAL MEDICINE

## 2023-11-01 RX ORDER — KETOROLAC TROMETHAMINE 30 MG/ML
60 INJECTION, SOLUTION INTRAMUSCULAR; INTRAVENOUS ONCE
Status: COMPLETED | OUTPATIENT
Start: 2023-11-01 | End: 2023-11-01

## 2023-11-01 RX ORDER — METHYLPREDNISOLONE ACETATE 80 MG/ML
80 INJECTION, SUSPENSION INTRA-ARTICULAR; INTRALESIONAL; INTRAMUSCULAR; SOFT TISSUE ONCE
Status: COMPLETED | OUTPATIENT
Start: 2023-11-01 | End: 2023-11-01

## 2023-11-01 RX ADMIN — KETOROLAC TROMETHAMINE 60 MG: 60 INJECTION, SOLUTION INTRAMUSCULAR at 16:39

## 2023-11-01 RX ADMIN — METHYLPREDNISOLONE ACETATE 80 MG: 80 INJECTION, SUSPENSION INTRA-ARTICULAR; INTRALESIONAL; INTRAMUSCULAR; SOFT TISSUE at 16:42

## 2023-11-01 NOTE — TELEPHONE ENCOUNTER
PT CALLED, ASKING FOR TORADOL & KENALOG INJECTIONS FOR SEVERER PAIN IN HIP ON FRONT SIDE OF HIP. CANNOT LIFT LEG STRAIGHT UP PAST  INCH.

## 2023-11-01 NOTE — TELEPHONE ENCOUNTER
Ok for depomedrol 80 mg and toradol 60 mg. Hydrate well and no oral NSAIDS for 24 hrs.     PT SCHEDULED

## 2023-11-14 DIAGNOSIS — J45.909 ASTHMA, UNSPECIFIED ASTHMA SEVERITY, UNSPECIFIED WHETHER COMPLICATED, UNSPECIFIED WHETHER PERSISTENT (HHS-HCC): Primary | ICD-10-CM

## 2023-11-14 RX ORDER — ALBUTEROL SULFATE 90 UG/1
AEROSOL, METERED RESPIRATORY (INHALATION)
Qty: 18 G | Refills: 3 | Status: SHIPPED | OUTPATIENT
Start: 2023-11-14

## 2023-11-24 ENCOUNTER — SPECIALTY PHARMACY (OUTPATIENT)
Dept: PHARMACY | Facility: CLINIC | Age: 53
End: 2023-11-24

## 2023-11-24 ENCOUNTER — PHARMACY VISIT (OUTPATIENT)
Dept: PHARMACY | Facility: CLINIC | Age: 53
End: 2023-11-24
Payer: COMMERCIAL

## 2023-11-24 PROCEDURE — RXMED WILLOW AMBULATORY MEDICATION CHARGE

## 2023-11-29 ENCOUNTER — SPECIALTY PHARMACY (OUTPATIENT)
Dept: PHARMACY | Facility: CLINIC | Age: 53
End: 2023-11-29

## 2023-12-08 DIAGNOSIS — B99.9 INFECTION: Primary | ICD-10-CM

## 2023-12-08 RX ORDER — FLUCONAZOLE 150 MG/1
TABLET ORAL
Qty: 12 TABLET | Refills: 1 | Status: SHIPPED | OUTPATIENT
Start: 2023-12-08

## 2023-12-08 RX ORDER — AMOXICILLIN AND CLAVULANATE POTASSIUM 875; 125 MG/1; MG/1
875 TABLET, FILM COATED ORAL 2 TIMES DAILY
Qty: 28 TABLET | Refills: 0 | Status: SHIPPED | OUTPATIENT
Start: 2023-12-08 | End: 2023-12-22

## 2023-12-11 PROCEDURE — 88305 TISSUE EXAM BY PATHOLOGIST: CPT

## 2023-12-11 PROCEDURE — 88305 TISSUE EXAM BY PATHOLOGIST: CPT | Performed by: DERMATOLOGY

## 2023-12-14 ENCOUNTER — LAB REQUISITION (OUTPATIENT)
Dept: LAB | Facility: HOSPITAL | Age: 53
End: 2023-12-14
Payer: COMMERCIAL

## 2023-12-14 DIAGNOSIS — D48.5 NEOPLASM OF UNCERTAIN BEHAVIOR OF SKIN: ICD-10-CM

## 2023-12-15 LAB
LABORATORY COMMENT REPORT: NORMAL
PATH REPORT.FINAL DX SPEC: NORMAL
PATH REPORT.GROSS SPEC: NORMAL
PATH REPORT.MICROSCOPIC SPEC OTHER STN: NORMAL
PATH REPORT.RELEVANT HX SPEC: NORMAL
PATH REPORT.TOTAL CANCER: NORMAL

## 2023-12-19 ENCOUNTER — SPECIALTY PHARMACY (OUTPATIENT)
Dept: PHARMACY | Facility: CLINIC | Age: 53
End: 2023-12-19

## 2023-12-19 PROCEDURE — RXMED WILLOW AMBULATORY MEDICATION CHARGE

## 2023-12-20 ENCOUNTER — PHARMACY VISIT (OUTPATIENT)
Dept: PHARMACY | Facility: CLINIC | Age: 53
End: 2023-12-20
Payer: COMMERCIAL

## 2023-12-21 ENCOUNTER — PHARMACY VISIT (OUTPATIENT)
Dept: PHARMACY | Facility: CLINIC | Age: 53
End: 2023-12-21
Payer: COMMERCIAL

## 2024-01-02 ENCOUNTER — PATIENT MESSAGE (OUTPATIENT)
Dept: PRIMARY CARE | Facility: CLINIC | Age: 54
End: 2024-01-02
Payer: COMMERCIAL

## 2024-01-02 DIAGNOSIS — K21.9 GASTROESOPHAGEAL REFLUX DISEASE, UNSPECIFIED WHETHER ESOPHAGITIS PRESENT: Primary | ICD-10-CM

## 2024-01-03 RX ORDER — OMEPRAZOLE 20 MG/1
20 CAPSULE, DELAYED RELEASE ORAL DAILY
Qty: 30 CAPSULE | Refills: 1 | Status: SHIPPED | OUTPATIENT
Start: 2024-01-03 | End: 2024-05-30 | Stop reason: SDUPTHER

## 2024-01-16 ENCOUNTER — APPOINTMENT (OUTPATIENT)
Dept: PRIMARY CARE | Facility: CLINIC | Age: 54
End: 2024-01-16
Payer: COMMERCIAL

## 2024-01-17 ENCOUNTER — SPECIALTY PHARMACY (OUTPATIENT)
Dept: PHARMACY | Facility: CLINIC | Age: 54
End: 2024-01-17

## 2024-01-17 PROCEDURE — RXMED WILLOW AMBULATORY MEDICATION CHARGE

## 2024-01-18 ENCOUNTER — OFFICE VISIT (OUTPATIENT)
Dept: PRIMARY CARE | Facility: CLINIC | Age: 54
End: 2024-01-18
Payer: COMMERCIAL

## 2024-01-18 ENCOUNTER — PHARMACY VISIT (OUTPATIENT)
Dept: PHARMACY | Facility: CLINIC | Age: 54
End: 2024-01-18
Payer: COMMERCIAL

## 2024-01-18 VITALS
OXYGEN SATURATION: 97 % | WEIGHT: 250.2 LBS | TEMPERATURE: 97 F | SYSTOLIC BLOOD PRESSURE: 106 MMHG | BODY MASS INDEX: 41.69 KG/M2 | HEIGHT: 65 IN | HEART RATE: 74 BPM | DIASTOLIC BLOOD PRESSURE: 82 MMHG

## 2024-01-18 DIAGNOSIS — R41.840 ATTENTION AND CONCENTRATION DEFICIT: Primary | ICD-10-CM

## 2024-01-18 PROCEDURE — 99213 OFFICE O/P EST LOW 20 MIN: CPT | Performed by: FAMILY MEDICINE

## 2024-01-18 PROCEDURE — 1036F TOBACCO NON-USER: CPT | Performed by: FAMILY MEDICINE

## 2024-01-18 RX ORDER — AMMONIUM LACTATE 12 G/100G
LOTION TOPICAL
COMMUNITY
Start: 2024-01-09 | End: 2024-05-30 | Stop reason: WASHOUT

## 2024-01-18 RX ORDER — HYDROXYZINE HYDROCHLORIDE 25 MG/1
TABLET, FILM COATED ORAL AS NEEDED
COMMUNITY

## 2024-01-18 ASSESSMENT — PAIN SCALES - GENERAL: PAINLEVEL: 5

## 2024-01-18 ASSESSMENT — PATIENT HEALTH QUESTIONNAIRE - PHQ9
SUM OF ALL RESPONSES TO PHQ9 QUESTIONS 1 AND 2: 0
2. FEELING DOWN, DEPRESSED OR HOPELESS: NOT AT ALL
1. LITTLE INTEREST OR PLEASURE IN DOING THINGS: NOT AT ALL

## 2024-01-18 NOTE — PATIENT INSTRUCTIONS
Problem List Items Addressed This Visit             ICD-10-CM    Attention and concentration deficit - Primary R41.840     - Secondary to expressed concerns, would recommend formal assessment to which I have given referral    [facility] Zainab Augustin  Call to schedule at [phone]         Relevant Orders    Referral to Psychiatry

## 2024-01-18 NOTE — ASSESSMENT & PLAN NOTE
- Secondary to expressed concerns, would recommend formal assessment to which I have given referral    [facility] Zainab Augustin  Call to schedule at [phone]

## 2024-01-18 NOTE — PROGRESS NOTES
"       Outpatient Visit Note    Chief Complaint   Patient presents with    ADHD     Discuss options for possible ADHD or ADD.          HPI:  Sue Schafer is a 53 y.o. female with a past medical history significant for rheumatoid arthritis with plaque psoriasis and neuropathy along with vitamin D deficiency followed diligently by Dr. Leiva of rheumatology who presents to the office for current concerns regarding concentration difficulties.  She was last seen in the office on 10/13/2023 for ER follow-up.    She reports longstanding struggles with attention/focus though no formal assessment or diagnosis.  Does have difficulty completing tasks, often \"overthinking\" what needs to be done.  This did previously create academic issues to which she traditionally had an IEP in school.  Does admit to a strong family history of mood conditions/ADHD.  Expresses interest in obtaining consultation    Current Medications  Current Outpatient Medications   Medication Instructions    abatacept (ORENCIA) 125 mg/mL auto-injector auto-injection INJECT 125 MG (1 PEN) UNDER THE SKIN EVERY WEEK AS DIRECTED    albuterol (Ventolin HFA) 90 mcg/actuation inhaler PRN    ammonium lactate (Lac-Hydrin) 12 % lotion APPLY 1 APPLICATION TO AFFECTED AREAS TWICE A DAY    apremilast (Otezla) 30 mg tablet TAKE ONE (1) TABLET BY MOUTH TWICE DAILY    cetirizine (ZyrTEC) 10 mg capsule TAKE 1 CAPSULE Daily    cyclobenzaprine (Flexeril) 10 mg tablet Take 1 tablet (10 mg) by mouth. PRN    ergocalciferol (Vitamin D-2) 1.25 MG (27238 UT) capsule 1 capsule po weekly for 90 days    fluconazole (Diflucan) 150 mg tablet Monday, Wednesday, Friday as needed for yeast infection.    folic acid (Folvite) 1 mg tablet 1 tablet Orally Once a day for 90 days    furosemide (Lasix) 20 mg tablet 3 times a day as needed.    hydroxychloroquine (PlaqueniL) 200 mg tablet 1 tablet Orally twice daily for 90 days    hydrOXYzine HCL (Atarax) 25 mg tablet oral, As needed    " lidocaine (Lidoderm) 5 % patch 1 patch remove after 12 hours Externally Once a day for 30 days    omeprazole (PRILOSEC) 20 mg, oral, Daily, Do not crush or chew.    potassium chloride ER (Micro-K) 10 mEq ER capsule Potassium Chloride ER 10 MEQ Oral Capsule Extended Release   Refills: 0       Active        Allergies  Allergies   Allergen Reactions    Other Rash     Rash from Adhesive from Band-Aid        Past Medical History:   Diagnosis Date    Asthma     Dermatitis     GERD (gastroesophageal reflux disease)     Lower extremity edema     Plaque psoriasis     Positive KRISTAL (antinuclear antibody)     Seropositive rheumatoid arthritis of multiple sites (CMS/HCC)       Past Surgical History:   Procedure Laterality Date    BLADDER SUSPENSION      BONE BIOPSY      JAW    HYSTERECTOMY  2014    Hysterectomy    WISDOM TOOTH EXTRACTION       Family History   Problem Relation Name Age of Onset    Diabetes Mother Meg     Heart disease Mother Meg         Stent    Rosacea Mother Meg     Miscarriages / Stillbirths Mother Meg      labor Mother Meg     Stroke Father Garza sr.     Multiple sclerosis Father Garza sr.     Alcohol abuse Father Garza sr.     Hypertension Father Garza sr.     Other (PMR) Sister Emily     Fibromyalgia Sister Emily     Arthritis Sister Emily      labor Sister Emily     Ovarian cancer Sister Maryjo     Breast cancer Sister Ying      labor Sister Ying     Psoriasis Sister      Crohn's disease Sister      Multiple sclerosis Sister      Polymyalgia rheumatica Brother Ray     Anesthesia related problems Brother Ray     Multiple sclerosis Brother Garza     Bipolar disorder Brother Garza     Asthma Brother Garza     Multiple sclerosis Brother      Congenital heart disease Daughter      Other (Mental Health) Son Ti     Learning disabilities Son Ti     Mental illness Son Ti     Breast cancer Maternal Grandmother Gracia     Asthma Maternal  Grandmother Gracia     Diabetes Maternal Grandmother Gracia     Emphysema Maternal Grandmother Gracia     Heart attack Maternal Grandmother Gracia     Heart disease Maternal Grandmother Gracia     Diabetes Maternal Grandfather Jayden     Kidney disease Maternal Grandfather Jayden     Breast cancer Other          grandmother     Social History     Tobacco Use    Smoking status: Never    Smokeless tobacco: Never   Vaping Use    Vaping Use: Never used   Substance Use Topics    Alcohol use: Not Currently     Comment: Maybe one evry three months    Drug use: Never       ROS  All pertinent positive symptoms are included in the history of present illness.  All other systems have been reviewed and are negative and noncontributory to this patient's current ailments.    VITAL SIGNS  Vitals:    01/18/24 0811   BP: 106/82   Pulse: 74   Temp: 36.1 °C (97 °F)   SpO2: 97%         PHYSICAL EXAM  GENERAL APPEARANCE: alert and oriented, Pleasant and cooperative, No Acute Distress  HEENT: EOMI, PERRLA, MMM  EXTREMITIES: no edema, normal ROM  NEUROLOGIC EXAM: non-focal exam  MUSCULOSKELETAL: no gross abnormalities  PSYCH: affect is normal, eye contact is good      Assessment/Plan   Problem List Items Addressed This Visit             ICD-10-CM    Attention and concentration deficit - Primary R41.840     - Secondary to expressed concerns, would recommend formal assessment to which I have given referral    [facility] [place]  Call to schedule at [phone]         Relevant Orders    Referral to Psychiatry

## 2024-01-27 DIAGNOSIS — B99.9 INFECTION: Primary | ICD-10-CM

## 2024-01-27 RX ORDER — AMOXICILLIN AND CLAVULANATE POTASSIUM 875; 125 MG/1; MG/1
875 TABLET, FILM COATED ORAL 2 TIMES DAILY
Qty: 28 TABLET | Refills: 0 | Status: SHIPPED | OUTPATIENT
Start: 2024-01-27 | End: 2024-02-10

## 2024-02-02 ENCOUNTER — TELEPHONE (OUTPATIENT)
Dept: RHEUMATOLOGY | Facility: CLINIC | Age: 54
End: 2024-02-02
Payer: COMMERCIAL

## 2024-02-02 ENCOUNTER — CLINICAL SUPPORT (OUTPATIENT)
Dept: RHEUMATOLOGY | Facility: CLINIC | Age: 54
End: 2024-02-02
Payer: COMMERCIAL

## 2024-02-02 DIAGNOSIS — M05.79 SEROPOSITIVE RHEUMATOID ARTHRITIS OF MULTIPLE SITES (MULTI): Primary | ICD-10-CM

## 2024-02-02 PROCEDURE — 2500000004 HC RX 250 GENERAL PHARMACY W/ HCPCS (ALT 636 FOR OP/ED): Performed by: INTERNAL MEDICINE

## 2024-02-02 PROCEDURE — 96372 THER/PROPH/DIAG INJ SC/IM: CPT | Performed by: INTERNAL MEDICINE

## 2024-02-02 RX ORDER — METHYLPREDNISOLONE ACETATE 80 MG/ML
80 INJECTION, SUSPENSION INTRA-ARTICULAR; INTRALESIONAL; INTRAMUSCULAR; SOFT TISSUE ONCE
Status: COMPLETED | OUTPATIENT
Start: 2024-02-02 | End: 2024-02-02

## 2024-02-02 RX ORDER — KETOROLAC TROMETHAMINE 30 MG/ML
60 INJECTION, SOLUTION INTRAMUSCULAR; INTRAVENOUS ONCE
Status: COMPLETED | OUTPATIENT
Start: 2024-02-02 | End: 2024-02-02

## 2024-02-02 RX ADMIN — METHYLPREDNISOLONE ACETATE 80 MG: 80 INJECTION, SUSPENSION INTRA-ARTICULAR; INTRALESIONAL; INTRAMUSCULAR; SOFT TISSUE at 15:20

## 2024-02-02 RX ADMIN — KETOROLAC TROMETHAMINE 60 MG: 30 INJECTION, SOLUTION INTRAMUSCULAR at 15:17

## 2024-02-02 NOTE — TELEPHONE ENCOUNTER
PT IS HAVING SWELLING AROUNG LEFT WRIST AFFECTING THE LITTLE FINGER & HAND. C/O RT ANKLE & FOOT SWELLING & ANKLE JOINT FEELS UNSTABLE. PT REQUESTING DEPOMEDROL & TORADOL INJECTIONS

## 2024-02-06 ENCOUNTER — SPECIALTY PHARMACY (OUTPATIENT)
Dept: PHARMACY | Facility: CLINIC | Age: 54
End: 2024-02-06

## 2024-02-06 PROCEDURE — RXMED WILLOW AMBULATORY MEDICATION CHARGE

## 2024-02-07 ENCOUNTER — PHARMACY VISIT (OUTPATIENT)
Dept: PHARMACY | Facility: CLINIC | Age: 54
End: 2024-02-07
Payer: COMMERCIAL

## 2024-02-07 PROCEDURE — RXMED WILLOW AMBULATORY MEDICATION CHARGE

## 2024-02-18 ENCOUNTER — SPECIALTY PHARMACY (OUTPATIENT)
Dept: PHARMACY | Facility: CLINIC | Age: 54
End: 2024-02-18

## 2024-03-06 DIAGNOSIS — M05.79 SEROPOSITIVE RHEUMATOID ARTHRITIS OF MULTIPLE SITES (MULTI): Primary | ICD-10-CM

## 2024-03-06 PROCEDURE — RXMED WILLOW AMBULATORY MEDICATION CHARGE

## 2024-03-07 RX ORDER — ABATACEPT 125 MG/ML
INJECTION, SOLUTION SUBCUTANEOUS
Qty: 4 ML | Refills: 11 | Status: SHIPPED | OUTPATIENT
Start: 2024-03-07 | End: 2025-03-07

## 2024-03-08 PROCEDURE — RXMED WILLOW AMBULATORY MEDICATION CHARGE

## 2024-03-14 ENCOUNTER — TELEPHONE (OUTPATIENT)
Dept: RHEUMATOLOGY | Facility: CLINIC | Age: 54
End: 2024-03-14
Payer: COMMERCIAL

## 2024-03-14 ENCOUNTER — CLINICAL SUPPORT (OUTPATIENT)
Dept: RHEUMATOLOGY | Facility: CLINIC | Age: 54
End: 2024-03-14
Payer: COMMERCIAL

## 2024-03-14 DIAGNOSIS — M05.79 SEROPOSITIVE RHEUMATOID ARTHRITIS OF MULTIPLE SITES (MULTI): ICD-10-CM

## 2024-03-14 DIAGNOSIS — M05.79 SEROPOSITIVE RHEUMATOID ARTHRITIS OF MULTIPLE SITES (MULTI): Primary | ICD-10-CM

## 2024-03-14 PROCEDURE — 2500000004 HC RX 250 GENERAL PHARMACY W/ HCPCS (ALT 636 FOR OP/ED): Performed by: INTERNAL MEDICINE

## 2024-03-14 PROCEDURE — 96372 THER/PROPH/DIAG INJ SC/IM: CPT | Performed by: INTERNAL MEDICINE

## 2024-03-14 RX ORDER — KETOROLAC TROMETHAMINE 30 MG/ML
60 INJECTION, SOLUTION INTRAMUSCULAR; INTRAVENOUS ONCE
Status: COMPLETED | OUTPATIENT
Start: 2024-03-14 | End: 2024-03-14

## 2024-03-14 RX ORDER — METHYLPREDNISOLONE ACETATE 80 MG/ML
80 INJECTION, SUSPENSION INTRA-ARTICULAR; INTRALESIONAL; INTRAMUSCULAR; SOFT TISSUE ONCE
Status: COMPLETED | OUTPATIENT
Start: 2024-03-14 | End: 2024-03-14

## 2024-03-14 RX ADMIN — KETOROLAC TROMETHAMINE 60 MG: 60 INJECTION, SOLUTION INTRAMUSCULAR at 13:34

## 2024-03-14 RX ADMIN — METHYLPREDNISOLONE ACETATE 80 MG: 80 INJECTION, SUSPENSION INTRA-ARTICULAR; INTRALESIONAL; INTRAMUSCULAR; SOFT TISSUE at 13:37

## 2024-03-14 NOTE — TELEPHONE ENCOUNTER
PT IN FLARE WITH PAIN IN HANDS, ELBOW, & ANKLES. HANDS FEEL LIKE SHE CANNOT MAKE A FIST. ASKING IF SHE COULD GET DEPOMEDROL INJECTION & DOES SHE NEED TORADOL INJECTION ALSO? PLEASE ADVISE

## 2024-03-15 ENCOUNTER — PHARMACY VISIT (OUTPATIENT)
Dept: PHARMACY | Facility: CLINIC | Age: 54
End: 2024-03-15
Payer: COMMERCIAL

## 2024-03-18 ENCOUNTER — PHARMACY VISIT (OUTPATIENT)
Dept: PHARMACY | Facility: CLINIC | Age: 54
End: 2024-03-18
Payer: COMMERCIAL

## 2024-03-21 DIAGNOSIS — M05.79 SEROPOSITIVE RHEUMATOID ARTHRITIS OF MULTIPLE SITES (MULTI): Primary | ICD-10-CM

## 2024-03-21 RX ORDER — KETOROLAC TROMETHAMINE 10 MG/1
10 TABLET, FILM COATED ORAL EVERY 6 HOURS PRN
Qty: 20 TABLET | Refills: 0 | Status: SHIPPED | OUTPATIENT
Start: 2024-03-21 | End: 2024-03-26

## 2024-03-27 ENCOUNTER — LAB (OUTPATIENT)
Dept: LAB | Facility: LAB | Age: 54
End: 2024-03-27
Payer: COMMERCIAL

## 2024-03-27 DIAGNOSIS — E55.9 VITAMIN D DEFICIENCY: ICD-10-CM

## 2024-03-27 DIAGNOSIS — R76.8 POSITIVE ANA (ANTINUCLEAR ANTIBODY): ICD-10-CM

## 2024-03-27 DIAGNOSIS — G62.9 NEUROPATHY: ICD-10-CM

## 2024-03-27 DIAGNOSIS — M05.79 SEROPOSITIVE RHEUMATOID ARTHRITIS OF MULTIPLE SITES (MULTI): ICD-10-CM

## 2024-03-27 LAB
25(OH)D3 SERPL-MCNC: 20 NG/ML (ref 31–100)
ALBUMIN SERPL-MCNC: 4.3 G/DL (ref 3.5–5)
ALP BLD-CCNC: 105 U/L (ref 35–125)
ALT SERPL-CCNC: 18 U/L (ref 5–40)
ANION GAP SERPL CALC-SCNC: 13 MMOL/L
APPEARANCE UR: CLEAR
AST SERPL-CCNC: 16 U/L (ref 5–40)
BASOPHILS # BLD AUTO: 0.09 X10*3/UL (ref 0–0.1)
BASOPHILS NFR BLD AUTO: 0.9 %
BILIRUB SERPL-MCNC: 0.4 MG/DL (ref 0.1–1.2)
BILIRUB UR STRIP.AUTO-MCNC: NEGATIVE MG/DL
BUN SERPL-MCNC: 14 MG/DL (ref 8–25)
CALCIUM SERPL-MCNC: 9.3 MG/DL (ref 8.5–10.4)
CHLORIDE SERPL-SCNC: 101 MMOL/L (ref 97–107)
CK SERPL-CCNC: 63 U/L (ref 24–195)
CO2 SERPL-SCNC: 25 MMOL/L (ref 24–31)
COLOR UR: YELLOW
CREAT SERPL-MCNC: 0.7 MG/DL (ref 0.4–1.6)
CRP SERPL-MCNC: 1 MG/DL (ref 0–2)
EGFRCR SERPLBLD CKD-EPI 2021: >90 ML/MIN/1.73M*2
EOSINOPHIL # BLD AUTO: 0.06 X10*3/UL (ref 0–0.7)
EOSINOPHIL NFR BLD AUTO: 0.6 %
ERYTHROCYTE [DISTWIDTH] IN BLOOD BY AUTOMATED COUNT: 13.2 % (ref 11.5–14.5)
ERYTHROCYTE [SEDIMENTATION RATE] IN BLOOD BY WESTERGREN METHOD: 18 MM/H (ref 0–30)
GLUCOSE SERPL-MCNC: 207 MG/DL (ref 65–99)
GLUCOSE UR STRIP.AUTO-MCNC: NORMAL MG/DL
HCT VFR BLD AUTO: 41.6 % (ref 36–46)
HGB BLD-MCNC: 13 G/DL (ref 12–16)
IMM GRANULOCYTES # BLD AUTO: 0.03 X10*3/UL (ref 0–0.7)
IMM GRANULOCYTES NFR BLD AUTO: 0.3 % (ref 0–0.9)
KETONES UR STRIP.AUTO-MCNC: NEGATIVE MG/DL
LEUKOCYTE ESTERASE UR QL STRIP.AUTO: NEGATIVE
LYMPHOCYTES # BLD AUTO: 3.18 X10*3/UL (ref 1.2–4.8)
LYMPHOCYTES NFR BLD AUTO: 30.4 %
MCH RBC QN AUTO: 28.4 PG (ref 26–34)
MCHC RBC AUTO-ENTMCNC: 31.3 G/DL (ref 32–36)
MCV RBC AUTO: 91 FL (ref 80–100)
MONOCYTES # BLD AUTO: 0.52 X10*3/UL (ref 0.1–1)
MONOCYTES NFR BLD AUTO: 5 %
NEUTROPHILS # BLD AUTO: 6.59 X10*3/UL (ref 1.2–7.7)
NEUTROPHILS NFR BLD AUTO: 62.8 %
NITRITE UR QL STRIP.AUTO: NEGATIVE
NRBC BLD-RTO: 0 /100 WBCS (ref 0–0)
PH UR STRIP.AUTO: 6 [PH]
PLATELET # BLD AUTO: 289 X10*3/UL (ref 150–450)
POTASSIUM SERPL-SCNC: 4 MMOL/L (ref 3.4–5.1)
PROT SERPL-MCNC: 6.8 G/DL (ref 5.9–7.9)
PROT UR STRIP.AUTO-MCNC: NEGATIVE MG/DL
RBC # BLD AUTO: 4.57 X10*6/UL (ref 4–5.2)
RBC # UR STRIP.AUTO: NEGATIVE /UL
SODIUM SERPL-SCNC: 139 MMOL/L (ref 133–145)
SP GR UR STRIP.AUTO: 1.02
UROBILINOGEN UR STRIP.AUTO-MCNC: NORMAL MG/DL
WBC # BLD AUTO: 10.5 X10*3/UL (ref 4.4–11.3)

## 2024-03-27 PROCEDURE — 86160 COMPLEMENT ANTIGEN: CPT

## 2024-03-27 PROCEDURE — 82306 VITAMIN D 25 HYDROXY: CPT

## 2024-03-27 PROCEDURE — 86225 DNA ANTIBODY NATIVE: CPT

## 2024-03-27 PROCEDURE — 36415 COLL VENOUS BLD VENIPUNCTURE: CPT

## 2024-03-27 PROCEDURE — 85652 RBC SED RATE AUTOMATED: CPT

## 2024-03-27 PROCEDURE — 86803 HEPATITIS C AB TEST: CPT

## 2024-03-27 PROCEDURE — 82550 ASSAY OF CK (CPK): CPT

## 2024-03-27 PROCEDURE — 81003 URINALYSIS AUTO W/O SCOPE: CPT

## 2024-03-27 PROCEDURE — 86618 LYME DISEASE ANTIBODY: CPT

## 2024-03-27 PROCEDURE — 80053 COMPREHEN METABOLIC PANEL: CPT

## 2024-03-27 PROCEDURE — 85025 COMPLETE CBC W/AUTO DIFF WBC: CPT

## 2024-03-27 PROCEDURE — 83825 ASSAY OF MERCURY: CPT

## 2024-03-27 PROCEDURE — 86140 C-REACTIVE PROTEIN: CPT

## 2024-03-28 LAB
C3 SERPL-MCNC: 165 MG/DL (ref 87–200)
C4 SERPL-MCNC: 26 MG/DL (ref 10–50)
DSDNA AB SER-ACNC: <1 IU/ML
HCV AB SER QL: NONREACTIVE
HOLD SPECIMEN: NORMAL

## 2024-03-29 DIAGNOSIS — I83.893 VARICOSE VEINS OF BOTH LEGS WITH EDEMA: Primary | ICD-10-CM

## 2024-03-29 LAB
ARSENIC BLD-MCNC: <10 UG/L
LEAD BLDV-MCNC: <2 UG/DL
MERCURY BLD-MCNC: <2.5 UG/L

## 2024-03-30 LAB — B BURGDOR.VLSE1+PEPC10 AB SER IA-ACNC: 0.14 IV

## 2024-04-01 DIAGNOSIS — M05.79 SEROPOSITIVE RHEUMATOID ARTHRITIS OF MULTIPLE SITES (MULTI): ICD-10-CM

## 2024-04-04 ENCOUNTER — TELEMEDICINE (OUTPATIENT)
Dept: PRIMARY CARE | Facility: CLINIC | Age: 54
End: 2024-04-04
Payer: COMMERCIAL

## 2024-04-04 VITALS
OXYGEN SATURATION: 100 % | SYSTOLIC BLOOD PRESSURE: 123 MMHG | TEMPERATURE: 97.7 F | HEART RATE: 76 BPM | DIASTOLIC BLOOD PRESSURE: 78 MMHG

## 2024-04-04 DIAGNOSIS — J06.9 ACUTE URI: Primary | ICD-10-CM

## 2024-04-04 PROCEDURE — 1036F TOBACCO NON-USER: CPT | Performed by: FAMILY MEDICINE

## 2024-04-04 PROCEDURE — 99214 OFFICE O/P EST MOD 30 MIN: CPT | Performed by: FAMILY MEDICINE

## 2024-04-04 RX ORDER — AZITHROMYCIN 250 MG/1
TABLET, FILM COATED ORAL
Qty: 6 TABLET | Refills: 0 | Status: SHIPPED | OUTPATIENT
Start: 2024-04-04 | End: 2024-04-09

## 2024-04-04 ASSESSMENT — ENCOUNTER SYMPTOMS
RHINORRHEA: 1
SORE THROAT: 1
COUGH: 1
DIARRHEA: 0
ABDOMINAL PAIN: 0
VOMITING: 0

## 2024-04-04 ASSESSMENT — PATIENT HEALTH QUESTIONNAIRE - PHQ9
2. FEELING DOWN, DEPRESSED OR HOPELESS: NOT AT ALL
1. LITTLE INTEREST OR PLEASURE IN DOING THINGS: NOT AT ALL
SUM OF ALL RESPONSES TO PHQ9 QUESTIONS 1 AND 2: 0

## 2024-04-04 ASSESSMENT — PAIN SCALES - GENERAL: PAINLEVEL: 6

## 2024-04-04 NOTE — PATIENT INSTRUCTIONS
Problem List Items Addressed This Visit             ICD-10-CM    Acute URI - Primary J06.9     - Given your symptoms and duration of illness, we feel that you can benefit from antibiotic coverage at this time   - A prescription for azithromycin was sent to your pharmacy, please take this medication as prescribed  - Recommend supportive care with increased fluid intake to thin secretions, Ibuprofen or Tylenol as needed for pain or fever, and steamy showers/saline nasal rinses to help clear the nasal passages   - You may consider tea with honey or a cinnamon stick as these have natural antiviral and antibiotic properties   - Call if symptoms worsen or do not improve with these treatments         Relevant Medications    azithromycin (Zithromax) 250 mg tablet       Counseling:       Medication education:         Education:  The patient is counseled regarding potential side-effects of all new medications        Understanding:  Patient expressed understanding        Adherence:  No barriers to adherence identified

## 2024-04-04 NOTE — PROGRESS NOTES
Outpatient Visit Note    Chief Complaint   Patient presents with    Nasal Congestion     Post nasal drainage x1 day.     Sore Throat    Cough       With patient's permission, this is a Telemedicine visit with audio only secondary to technical difficulties. The provider and patient participated in this telemedicine encounter.    HPI:  Sue Schafer is a 53 y.o. female with a past medical history significant for rheumatoid arthritis with plaque psoriasis and neuropathy along with vitamin D deficiency followed diligently by Dr. Leiva of rheumatology who presents to the office via telemedicine encounter for acute upper respiratory infection concerns.    She reports onset of postnasal drainage with sore throat and sinus pressure over the course of the last 48 hours.  Has been in close contact with relative who is actively being worked up for potential strep.  Does have longstanding history of sinusitis with suppressed immune system relating to rheumatologic history.  Has developed left-sided ear pain over the last 24 hours which is common with her upper respiratory/sinus symptoms.  States that her  is additionally sick and has been running a fever.  Has traditionally done well treating sinus infections with azithromycin or Augmentin that she has had 2 separate courses of Augmentin in January and December for other illnesses/UTI.    Current Medications  Current Outpatient Medications   Medication Instructions    abatacept (Orencia ClickJect) 125 mg/mL auto-injector auto-injection INJECT 125 MG (1 PEN) UNDER THE SKIN EVERY WEEK AS DIRECTED    albuterol (Ventolin HFA) 90 mcg/actuation inhaler PRN    ammonium lactate (Lac-Hydrin) 12 % lotion APPLY 1 APPLICATION TO AFFECTED AREAS TWICE A DAY    apremilast (Otezla) 30 mg tablet TAKE ONE (1) TABLET BY MOUTH TWICE DAILY    azithromycin (Zithromax) 250 mg tablet Take 2 tablets (500 mg) by mouth once daily for 1 day, THEN 1 tablet (250 mg) once daily for 4 days.  Take 2 tabs (500 mg) by mouth today, than 1 daily for 4 days..    cetirizine (ZyrTEC) 10 mg capsule 2 times a day.    cyclobenzaprine (Flexeril) 10 mg tablet Take 1 tablet (10 mg) by mouth. PRN    ergocalciferol (Vitamin D-2) 1.25 MG (90402 UT) capsule 1 capsule po weekly for 90 days    fluconazole (Diflucan) 150 mg tablet Monday, Wednesday, Friday as needed for yeast infection.    folic acid (Folvite) 1 mg tablet 1 tablet Orally Once a day for 90 days    furosemide (Lasix) 20 mg tablet 3 times a day as needed.    hydroxychloroquine (PlaqueniL) 200 mg tablet 1 tablet Orally twice daily for 90 days    hydrOXYzine HCL (Atarax) 25 mg tablet oral, As needed    lidocaine (Lidoderm) 5 % patch if needed.    omeprazole (PRILOSEC) 20 mg, oral, Daily, Do not crush or chew.    potassium chloride ER (Micro-K) 10 mEq ER capsule 1 capsule (10 mEq). Takes with water pill        Allergies  Allergies   Allergen Reactions    Other Rash     Rash from Adhesive from Band-Aid        Past Medical History:   Diagnosis Date    Allergic     Arthritis     Asthma     Dermatitis     GERD (gastroesophageal reflux disease)     Lower extremity edema     Plaque psoriasis     Positive KRISTAL (antinuclear antibody)     Seropositive rheumatoid arthritis of multiple sites (CMS/HCC)       Past Surgical History:   Procedure Laterality Date    BLADDER SUSPENSION      BONE BIOPSY  1996    JAW    HYSTERECTOMY  2014    Hysterectomy    WISDOM TOOTH EXTRACTION       Family History   Problem Relation Name Age of Onset    Diabetes Mother Meg     Heart disease Mother Meg         Stent    Rosacea Mother Meg     Miscarriages / Stillbirths Mother Meg      labor Mother Meg     Stroke Father Holland sr.     Multiple sclerosis Father Webster sr.     Alcohol abuse Father Holland sr.     Hypertension Father Webster sr.     Other (PMR) Sister Emily     Fibromyalgia Sister Emily     Arthritis Sister Emily      labor Sister Emily      Ovarian cancer Sister Maryjo     Breast cancer Sister Ying      labor Sister Ying     Psoriasis Sister      Crohn's disease Sister      Multiple sclerosis Sister      Polymyalgia rheumatica Brother Ray     Anesthesia related problems Brother Ray     Multiple sclerosis Brother Nu Mine     Bipolar disorder Brother Nu Mine     Asthma Brother Nu Mine     Multiple sclerosis Brother      Congenital heart disease Daughter      Other (Mental Health) Son Ti     Learning disabilities Son Ti     Mental illness Son Ti     Breast cancer Maternal Grandmother Gracia     Asthma Maternal Grandmother Gracia     Diabetes Maternal Grandmother Gracia     Emphysema Maternal Grandmother Gracia     Heart attack Maternal Grandmother Gracia     Heart disease Maternal Grandmother Gracia     Diabetes Maternal Grandfather Jayden     Kidney disease Maternal Grandfather Jayden     Breast cancer Other          grandmother     Social History     Tobacco Use    Smoking status: Never    Smokeless tobacco: Never   Vaping Use    Vaping Use: Never used   Substance Use Topics    Alcohol use: Not Currently     Comment: Maybe one every three months or longer    Drug use: Never       ROS  All pertinent positive symptoms are included in the history of present illness.  All other systems have been reviewed and are negative and noncontributory to this patient's current ailments.    VITAL SIGNS  Vitals:    24 1202   BP: 123/78   Pulse: 76   Temp: 36.5 °C (97.7 °F)   SpO2: 100%         PHYSICAL EXAM  No physical exam component completed secondary to telemedicine encounter      Assessment/Plan   Problem List Items Addressed This Visit             ICD-10-CM    Acute URI - Primary J06.9     - Given your symptoms and duration of illness, we feel that you can benefit from antibiotic coverage at this time   - A prescription for azithromycin was sent to your pharmacy, please take this medication as prescribed  - Recommend supportive  care with increased fluid intake to thin secretions, Ibuprofen or Tylenol as needed for pain or fever, and steamy showers/saline nasal rinses to help clear the nasal passages   - You may consider tea with honey or a cinnamon stick as these have natural antiviral and antibiotic properties   - Call if symptoms worsen or do not improve with these treatments         Relevant Medications    azithromycin (Zithromax) 250 mg tablet       Counseling:       Medication education:         Education:  The patient is counseled regarding potential side-effects of all new medications        Understanding:  Patient expressed understanding        Adherence:  No barriers to adherence identified

## 2024-04-04 NOTE — ASSESSMENT & PLAN NOTE
- Given your symptoms and duration of illness, we feel that you can benefit from antibiotic coverage at this time   - A prescription for azithromycin was sent to your pharmacy, please take this medication as prescribed  - Recommend supportive care with increased fluid intake to thin secretions, Ibuprofen or Tylenol as needed for pain or fever, and steamy showers/saline nasal rinses to help clear the nasal passages   - You may consider tea with honey or a cinnamon stick as these have natural antiviral and antibiotic properties   - Call if symptoms worsen or do not improve with these treatments

## 2024-04-06 LAB — C1Q SERPL-MCNC: 14.2 MG/DL (ref 10.3–20.5)

## 2024-04-09 ENCOUNTER — SPECIALTY PHARMACY (OUTPATIENT)
Dept: PHARMACY | Facility: CLINIC | Age: 54
End: 2024-04-09

## 2024-04-09 PROCEDURE — RXMED WILLOW AMBULATORY MEDICATION CHARGE

## 2024-04-09 PROCEDURE — 88305 TISSUE EXAM BY PATHOLOGIST: CPT | Performed by: DERMATOLOGY

## 2024-04-11 ENCOUNTER — LAB REQUISITION (OUTPATIENT)
Dept: DERMATOPATHOLOGY | Facility: CLINIC | Age: 54
End: 2024-04-11
Payer: COMMERCIAL

## 2024-04-11 ENCOUNTER — PHARMACY VISIT (OUTPATIENT)
Dept: PHARMACY | Facility: CLINIC | Age: 54
End: 2024-04-11
Payer: COMMERCIAL

## 2024-04-11 DIAGNOSIS — R20.8 OTHER DISTURBANCES OF SKIN SENSATION: ICD-10-CM

## 2024-04-11 DIAGNOSIS — D48.5 NEOPLASM OF UNCERTAIN BEHAVIOR OF SKIN: ICD-10-CM

## 2024-04-15 ENCOUNTER — APPOINTMENT (OUTPATIENT)
Dept: RHEUMATOLOGY | Facility: CLINIC | Age: 54
End: 2024-04-15
Payer: COMMERCIAL

## 2024-04-24 ENCOUNTER — TELEPHONE (OUTPATIENT)
Dept: RHEUMATOLOGY | Facility: CLINIC | Age: 54
End: 2024-04-24
Payer: COMMERCIAL

## 2024-04-24 DIAGNOSIS — L40.0 PLAQUE PSORIASIS: ICD-10-CM

## 2024-04-24 DIAGNOSIS — M05.79 SEROPOSITIVE RHEUMATOID ARTHRITIS OF MULTIPLE SITES (MULTI): ICD-10-CM

## 2024-05-03 DIAGNOSIS — L40.0 PLAQUE PSORIASIS: Primary | ICD-10-CM

## 2024-05-05 RX ORDER — APREMILAST 30 MG/1
30 TABLET, FILM COATED ORAL 2 TIMES DAILY
Qty: 60 TABLET | Refills: 11 | Status: SHIPPED | OUTPATIENT
Start: 2024-05-05 | End: 2025-05-05

## 2024-05-06 PROCEDURE — RXMED WILLOW AMBULATORY MEDICATION CHARGE

## 2024-05-07 ENCOUNTER — SPECIALTY PHARMACY (OUTPATIENT)
Dept: PHARMACY | Facility: CLINIC | Age: 54
End: 2024-05-07

## 2024-05-07 PROCEDURE — RXMED WILLOW AMBULATORY MEDICATION CHARGE

## 2024-05-09 ENCOUNTER — SPECIALTY PHARMACY (OUTPATIENT)
Dept: PHARMACY | Facility: CLINIC | Age: 54
End: 2024-05-09

## 2024-05-09 ENCOUNTER — PHARMACY VISIT (OUTPATIENT)
Dept: PHARMACY | Facility: CLINIC | Age: 54
End: 2024-05-09
Payer: COMMERCIAL

## 2024-05-09 NOTE — PROGRESS NOTES
[facility] Clinical Note    Sue Schafer is a 53 y.o. female, who is on the specialty pharmacy service of: [facility].  Sue Schafer is taking: Otezla and Orencia.     Sue was contacted on 5/9/2024.    Refer to the encounter summary report for documentation details about patient counseling and education.      Impression/Plan  Is patient high risk? (potential patients:  pregnancy, geriatric, pediatric)   None  Is laboratory follow-up needed? Per MD - already scheduled  Is a clinical intervention needed?  none  Next assessment date?  11/5/24  Additional comments: Pt was at work and unable to speak very long. 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 No questions for [facility] at this time.     Medication Adherence  The importance of adherence was discussed with the patient and they were advised to take the medication as prescribed by their provider. Sue was encouraged to call her physician's office if they have a question regarding a missed dose.     QOL/Patient Satisfaction  Rate your quality of life on scale of 1-10: -- (Pt was at work and unable to speak at length)  Rate your satisfaction with  Specialty Pharmacy on scale of 1-10: -- (Pt was at work and unable to speak at length)      Patient advised to contact the pharmacy if there are any changes to her medication list, including prescriptions, OTC medications, herbal products, or supplements. Patient was advised of [facility]’s dispensing process, refill timeline, contact information ([phone]), and patient management follow up. Patient confirmed understanding of education conducted during assessment. All patient questions and concerns were addressed to the best of my ability. Patient was encouraged to contact the specialty pharmacy with any questions or concerns.    Confirmed follow-up outreaches are properly scheduled. Reviewed goals of therapy in  the program targets.    Lito Dominguez, PharmD

## 2024-05-10 ENCOUNTER — LAB (OUTPATIENT)
Dept: LAB | Facility: LAB | Age: 54
End: 2024-05-10
Payer: COMMERCIAL

## 2024-05-10 DIAGNOSIS — M05.79 SEROPOSITIVE RHEUMATOID ARTHRITIS OF MULTIPLE SITES (MULTI): ICD-10-CM

## 2024-05-10 DIAGNOSIS — L40.0 PLAQUE PSORIASIS: ICD-10-CM

## 2024-05-10 LAB
25(OH)D3 SERPL-MCNC: 12 NG/ML (ref 30–100)
ALBUMIN SERPL BCP-MCNC: 4 G/DL (ref 3.4–5)
ALP SERPL-CCNC: 79 U/L (ref 33–110)
ALT SERPL W P-5'-P-CCNC: 16 U/L (ref 7–45)
ANION GAP SERPL CALC-SCNC: 11 MMOL/L (ref 10–20)
AST SERPL W P-5'-P-CCNC: 15 U/L (ref 9–39)
BASOPHILS # BLD AUTO: 0.06 X10*3/UL (ref 0–0.1)
BASOPHILS NFR BLD AUTO: 0.8 %
BILIRUB SERPL-MCNC: 0.4 MG/DL (ref 0–1.2)
BUN SERPL-MCNC: 10 MG/DL (ref 6–23)
C3 SERPL-MCNC: 149 MG/DL (ref 87–200)
C4 SERPL-MCNC: 22 MG/DL (ref 10–50)
CALCIUM SERPL-MCNC: 9.2 MG/DL (ref 8.6–10.3)
CHLORIDE SERPL-SCNC: 105 MMOL/L (ref 98–107)
CK SERPL-CCNC: 45 U/L (ref 0–215)
CO2 SERPL-SCNC: 29 MMOL/L (ref 21–32)
CREAT SERPL-MCNC: 0.59 MG/DL (ref 0.5–1.05)
CRP SERPL-MCNC: 0.78 MG/DL
EGFRCR SERPLBLD CKD-EPI 2021: >90 ML/MIN/1.73M*2
EOSINOPHIL # BLD AUTO: 0.13 X10*3/UL (ref 0–0.7)
EOSINOPHIL NFR BLD AUTO: 1.7 %
ERYTHROCYTE [DISTWIDTH] IN BLOOD BY AUTOMATED COUNT: 13.3 % (ref 11.5–14.5)
ERYTHROCYTE [SEDIMENTATION RATE] IN BLOOD BY WESTERGREN METHOD: 17 MM/H (ref 0–30)
GLUCOSE SERPL-MCNC: 97 MG/DL (ref 74–99)
HCT VFR BLD AUTO: 43.5 % (ref 36–46)
HGB BLD-MCNC: 13.5 G/DL (ref 12–16)
IMM GRANULOCYTES # BLD AUTO: 0.03 X10*3/UL (ref 0–0.7)
IMM GRANULOCYTES NFR BLD AUTO: 0.4 % (ref 0–0.9)
LYMPHOCYTES # BLD AUTO: 3.18 X10*3/UL (ref 1.2–4.8)
LYMPHOCYTES NFR BLD AUTO: 41.1 %
MCH RBC QN AUTO: 28.4 PG (ref 26–34)
MCHC RBC AUTO-ENTMCNC: 31 G/DL (ref 32–36)
MCV RBC AUTO: 91 FL (ref 80–100)
MONOCYTES # BLD AUTO: 0.52 X10*3/UL (ref 0.1–1)
MONOCYTES NFR BLD AUTO: 6.7 %
NEUTROPHILS # BLD AUTO: 3.81 X10*3/UL (ref 1.2–7.7)
NEUTROPHILS NFR BLD AUTO: 49.3 %
NRBC BLD-RTO: 0 /100 WBCS (ref 0–0)
PLATELET # BLD AUTO: 301 X10*3/UL (ref 150–450)
POTASSIUM SERPL-SCNC: 4.2 MMOL/L (ref 3.5–5.3)
PROT SERPL-MCNC: 6.6 G/DL (ref 6.4–8.2)
RBC # BLD AUTO: 4.76 X10*6/UL (ref 4–5.2)
SODIUM SERPL-SCNC: 141 MMOL/L (ref 136–145)
WBC # BLD AUTO: 7.7 X10*3/UL (ref 4.4–11.3)

## 2024-05-10 PROCEDURE — 86618 LYME DISEASE ANTIBODY: CPT

## 2024-05-10 PROCEDURE — 85652 RBC SED RATE AUTOMATED: CPT

## 2024-05-10 PROCEDURE — 36415 COLL VENOUS BLD VENIPUNCTURE: CPT

## 2024-05-10 PROCEDURE — 82550 ASSAY OF CK (CPK): CPT

## 2024-05-10 PROCEDURE — 82306 VITAMIN D 25 HYDROXY: CPT

## 2024-05-10 PROCEDURE — 86481 TB AG RESPONSE T-CELL SUSP: CPT

## 2024-05-10 PROCEDURE — 80053 COMPREHEN METABOLIC PANEL: CPT

## 2024-05-10 PROCEDURE — 86225 DNA ANTIBODY NATIVE: CPT

## 2024-05-10 PROCEDURE — 86160 COMPLEMENT ANTIGEN: CPT

## 2024-05-10 PROCEDURE — 85025 COMPLETE CBC W/AUTO DIFF WBC: CPT

## 2024-05-10 PROCEDURE — 86140 C-REACTIVE PROTEIN: CPT

## 2024-05-11 LAB — DSDNA AB SER-ACNC: <1 IU/ML

## 2024-05-12 LAB
NIL(NEG) CONTROL SPOT COUNT: NORMAL
PANEL A SPOT COUNT: 0
PANEL B SPOT COUNT: 1
POS CONTROL SPOT COUNT: NORMAL
T-SPOT. TB INTERPRETATION: NEGATIVE

## 2024-05-13 LAB — B BURGDOR.VLSE1+PEPC10 AB SER IA-ACNC: 0.22 IV

## 2024-05-21 LAB — SCAN RESULT: NORMAL

## 2024-05-22 ENCOUNTER — TELEMEDICINE (OUTPATIENT)
Dept: BEHAVIORAL HEALTH | Facility: CLINIC | Age: 54
End: 2024-05-22
Payer: COMMERCIAL

## 2024-05-22 DIAGNOSIS — R41.840 ATTENTION AND CONCENTRATION DEFICIT: ICD-10-CM

## 2024-05-22 DIAGNOSIS — Z13.39 ADHD (ATTENTION DEFICIT HYPERACTIVITY DISORDER) EVALUATION: ICD-10-CM

## 2024-05-22 LAB — C1Q SERPL-MCNC: 14.2 MG/DL (ref 10.3–20.5)

## 2024-05-22 PROCEDURE — 90791 PSYCH DIAGNOSTIC EVALUATION: CPT | Performed by: PSYCHOLOGIST

## 2024-05-22 PROCEDURE — 1036F TOBACCO NON-USER: CPT | Performed by: PSYCHOLOGIST
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
## 2024-05-30 ENCOUNTER — OFFICE VISIT (OUTPATIENT)
Dept: RHEUMATOLOGY | Facility: CLINIC | Age: 54
End: 2024-05-30
Payer: COMMERCIAL

## 2024-05-30 VITALS
WEIGHT: 240 LBS | SYSTOLIC BLOOD PRESSURE: 105 MMHG | HEIGHT: 65 IN | BODY MASS INDEX: 39.99 KG/M2 | DIASTOLIC BLOOD PRESSURE: 64 MMHG | HEART RATE: 61 BPM | OXYGEN SATURATION: 100 %

## 2024-05-30 DIAGNOSIS — M25.562 CHRONIC PAIN OF BOTH KNEES: ICD-10-CM

## 2024-05-30 DIAGNOSIS — K21.9 GASTROESOPHAGEAL REFLUX DISEASE, UNSPECIFIED WHETHER ESOPHAGITIS PRESENT: ICD-10-CM

## 2024-05-30 DIAGNOSIS — G89.29 CHRONIC PAIN OF BOTH KNEES: ICD-10-CM

## 2024-05-30 DIAGNOSIS — R53.83 OTHER FATIGUE: ICD-10-CM

## 2024-05-30 DIAGNOSIS — L40.0 PLAQUE PSORIASIS: ICD-10-CM

## 2024-05-30 DIAGNOSIS — R42 DIZZINESS: ICD-10-CM

## 2024-05-30 DIAGNOSIS — M25.561 CHRONIC PAIN OF BOTH KNEES: ICD-10-CM

## 2024-05-30 DIAGNOSIS — R76.8 POSITIVE ANA (ANTINUCLEAR ANTIBODY): ICD-10-CM

## 2024-05-30 DIAGNOSIS — M05.79 SEROPOSITIVE RHEUMATOID ARTHRITIS OF MULTIPLE SITES (MULTI): Primary | ICD-10-CM

## 2024-05-30 DIAGNOSIS — E55.9 VITAMIN D DEFICIENCY: ICD-10-CM

## 2024-05-30 PROCEDURE — 99214 OFFICE O/P EST MOD 30 MIN: CPT | Performed by: INTERNAL MEDICINE

## 2024-05-30 PROCEDURE — 1036F TOBACCO NON-USER: CPT | Performed by: INTERNAL MEDICINE

## 2024-05-30 RX ORDER — EPINEPHRINE 0.3 MG/.3ML
0.3 INJECTION SUBCUTANEOUS EVERY 5 MIN PRN
OUTPATIENT
Start: 2024-05-30

## 2024-05-30 RX ORDER — DIPHENHYDRAMINE HYDROCHLORIDE 50 MG/ML
50 INJECTION INTRAMUSCULAR; INTRAVENOUS AS NEEDED
OUTPATIENT
Start: 2024-05-30

## 2024-05-30 RX ORDER — ALBUTEROL SULFATE 0.83 MG/ML
3 SOLUTION RESPIRATORY (INHALATION) AS NEEDED
OUTPATIENT
Start: 2024-05-30

## 2024-05-30 RX ORDER — ERGOCALCIFEROL 1.25 MG/1
50000 CAPSULE ORAL 2 TIMES WEEKLY
Qty: 24 CAPSULE | Refills: 3 | Status: SHIPPED | OUTPATIENT
Start: 2024-05-30

## 2024-05-30 RX ORDER — OMEPRAZOLE 20 MG/1
20 CAPSULE, DELAYED RELEASE ORAL DAILY
Qty: 30 CAPSULE | Refills: 11 | Status: SHIPPED | OUTPATIENT
Start: 2024-05-30 | End: 2024-07-29

## 2024-05-30 RX ORDER — FAMOTIDINE 10 MG/ML
20 INJECTION INTRAVENOUS ONCE AS NEEDED
OUTPATIENT
Start: 2024-05-30

## 2024-05-30 ASSESSMENT — ROUTINE ASSESSMENT OF PATIENT INDEX DATA (RAPID3)
SEVERITY_SCORE: 0
DRESS_YOURSELF: WITH SOME DIFFICULTY
ON A SCALE OF ONE TO TEN, HOW MUCH PAIN HAVE YOU HAD BECAUSE OF YOUR CONDITION OVER THE PAST WEEK?: 7
FN_SCORE: 2.7
PICK_CLOTHES_OFF_FLOOR: WITHOUT ANY DIFFICULTY
PARTIPATE_RECREATIONAL_ACTIVITIES: WITH MUCH DIFFICULTY
TURN_FAUCETS_OFF: WITHOUT ANY DIFFICULTY
GOOD_NIGHTS_SLEEP: WITH SOME DIFFICULTY
ON A SCALE OF ONE TO TEN, CONSIDERING ALL THE WAYS IN WHICH ILLNESS AND HEALTH CONDITIONS MAY AFFECT YOU AT THIS TIME, PLEASE INDICATE BELOW HOW YOU ARE DOING:: 4
FEELINGS_DEPRESSION: WITHOUT ANY DIFFICULTY
WASH_DRY_BODY: WITHOUT ANY DIFFICULTY
WALK_KILOMETERS: WITH MUCH DIFFICULTY
LIFT_CUP_TO_MOUTH: WITH SOME DIFFICULTY
WEIGHTED_TOTAL_SCORE: 4.57
FEELINGS_ANXIETY_NERVOUS: WITHOUT ANY DIFFICULTY
IN_OUT_BED: WITHOUT ANY DIFFICULTY
ON A SCALE OF ONE TO TEN, HOW MUCH PAIN HAVE YOU HAD BECAUSE OF YOUR CONDITION OVER THE PAST WEEK?: 7
TOTAL RAPID3 SCORE: 13.7
ON A SCALE OF ONE TO TEN, CONSIDERING ALL THE WAYS IN WHICH ILLNESS AND HEALTH CONDITIONS MAY AFFECT YOU AT THIS TIME, PLEASE INDICATE BELOW HOW YOU ARE DOING:: 4
SEVERITY_SCORE: HIGH SEVERITY (HS)
IN_OUT_TRANSPORT: WITH SOME DIFFICULTY
WALK_FLAT_GROUND: WITH SOME DIFFICULTY
SUM OF QUESTIONS A TO J: 8

## 2024-05-30 ASSESSMENT — ENCOUNTER SYMPTOMS
OCCASIONAL FEELINGS OF UNSTEADINESS: 1
DEPRESSION: 0
LOSS OF SENSATION IN FEET: 0

## 2024-05-30 ASSESSMENT — PATIENT HEALTH QUESTIONNAIRE - PHQ9
SUM OF ALL RESPONSES TO PHQ9 QUESTIONS 1 AND 2: 0
1. LITTLE INTEREST OR PLEASURE IN DOING THINGS: NOT AT ALL
2. FEELING DOWN, DEPRESSED OR HOPELESS: NOT AT ALL

## 2024-05-30 NOTE — PROGRESS NOTES
[facility]/  Rheumatology  [address], [address]  [place] [address]  Phone: [phone]  Fax: [phone]    Rheumatology Progress Note 5/30/24    Sue Schafer is a 53 y.o. female here for   Chief Complaint   Patient presents with    Follow-up     With labs       Last Visit: 10/26/23    Rheum Hx      Referred by self for knee pain.  Chief complaint: Swollen hands, feet, legs, stiffness  Since February 2019  Slow onset  Intermittent remitting course  Precipitating factors: Fallen eyes  No associated symptoms  Better: Lately nothing  Worse: Walking sitting  Previous treatments:  Naproxen-somewhat helpful  Ibuprofen-somewhat helpful  Acetaminophen-somewhat helpful  Steroids-very helpful- Medrol, no side effects  Occupation: Medical assistant  Hx of fall on ice/knee trauma x21st in 2/2017, then 2/2019  Sometimes locks ups, hand locking at times  Bilateral shoulder, right hip, bilateral knees, bilateral ankle  pain  Swelling of right 5th digit and right leg  Morning stiffness until after hot shower  Hx of sciatica with pregnancy  Review of systems positive for:  Hair loss  MARIE sometimes; tightness more than SOB; last wk R  back/flank discomfort' Dx asthma in past- on inh prn with help  Lower extremity swelling by the end of the day  Sensitivity to the sun- itchy, red, swollen spots  Joint pain swelling stiffness  Weakness hands  Numbness tingling legs  Allergies, seasonal- Zyrtec helps  FHx: cousins x3- SLE  psoriasis- sister  rosacea- ronitter   1/4/2022   C-ANCA (PR3) BY EIA <0.2…    C-ANCA FLOURESCENCE Negative…    P-ANCA (MPO) BY EIA <0.2…    P-ANCA FLOURESCENCE Negative…    KRISTAL Positive…    KRISTAL Titer 1:320…    KRISTAL Pattern Nucleolar…    SSA ANTIBODIES <0.2…    SSB ANTIBODIES <0.2…    Angiotensin 1 Conv 30…    Thyroid Peroxidase (TPO) Antibody <1.0…    Citrulline Antibody, IgG 31… (H)    Centromere Ab Negative…    BULMARO-1 ANTIBODY <0.2…    RNP Antibody <0.2…         Previous  Tx  Previous treatments:  Naproxen-somewhat helpful  Ibuprofen-somewhat helpful  Acetaminophen-somewhat helpful  Steroids-very helpful- Medrol, no side effects  Toradol- very helpful  Otezla- currently on  Orencia- currently on    Health Maintenance  DXA- none  Component      Latest Ref Rng 10/27/2021   TB Result Negative…    Hepatitis B Surface AB POSITIVE…      Component      Latest Ref Rng 1/4/2022   HIV 1/2 Antigen/Antibody Screen with Reflex to Confirmation Non Reactive…    Hiv Interpretation Negative…    Hepatitis B Surface AG      NEG  NEGATIVE    Malignancy Hx- none  Immunization History   Administered Date(s) Administered    Influenza, trivalent, adjuvanted 04/01/2022, 10/28/2022    Annmarie SARS-CoV-2 Vaccination 04/01/2022          Past Medical History:   Diagnosis Date    Allergic     Arthritis     Asthma ([facility]-H[facility])     Dermatitis     GERD (gastroesophageal reflux disease)     Lower extremity edema     Plaque psoriasis     Positive KRISTAL (antinuclear antibody)     Seropositive rheumatoid arthritis of multiple sites (Multi)       Past Surgical History:   Procedure Laterality Date    BLADDER SUSPENSION      BONE BIOPSY  1996    JAW    HYSTERECTOMY  01/21/2014    Hysterectomy    WISDOM TOOTH EXTRACTION  1996      Current Outpatient Medications   Medication Sig Dispense Refill    abatacept (Orencia ClickJect) 125 mg/mL auto-injector auto-injection INJECT 125 MG (1 PEN) UNDER THE SKIN EVERY WEEK AS DIRECTED 4 mL 11    albuterol (Ventolin HFA) 90 mcg/actuation inhaler PRN 18 g 3    apremilast (Otezla) 30 mg tablet TAKE ONE (1) TABLET BY MOUTH TWICE DAILY 60 tablet 11    cetirizine (ZyrTEC) 10 mg capsule 2 times a day.      cyclobenzaprine (Flexeril) 10 mg tablet Take 1 tablet (10 mg) by mouth. PRN      fluconazole (Diflucan) 150 mg tablet Monday, Wednesday, Friday as needed for yeast infection. 12 tablet 1    folic acid (Folvite) 1 mg tablet 1 tablet Orally Once a day for 90 days      furosemide (Lasix) 20 mg  tablet 3 times a day as needed.      hydroxychloroquine (PlaqueniL) 200 mg tablet 1 tablet Orally twice daily for 90 days 180 tablet 3    hydrOXYzine HCL (Atarax) 25 mg tablet Take by mouth if needed for itching.      lidocaine (Lidoderm) 5 % patch if needed.      potassium chloride ER (Micro-K) 10 mEq ER capsule 1 capsule (10 mEq). Takes with water pill      ergocalciferol (Vitamin D-2) 1.25 MG (29107 UT) capsule Take 1 capsule (50,000 Units) by mouth 2 times a week. 1 capsule po weekly for 90 days 24 capsule 3    omeprazole (PriLOSEC) 20 mg DR capsule Take 1 capsule (20 mg) by mouth once daily. Do not crush or chew. 30 capsule 11     No current facility-administered medications for this visit.      Allergies   Allergen Reactions    Other Rash     Rash from Adhesive from Band-Aid        Vitals:    05/30/24 1300   Weight: 109 kg (240 lb)           Rapid 3  Function Score (FN): 2.7  Pain Score (PN) (0-10): 7  Patient Global (PTGL) (0-10): 4  Rapid3 Score: 13.7  RAPID3 Weighted Score: 4.57     Workup  Component      Latest Ref Rng 5/10/2024   LEUKOCYTES (10*3/UL) IN BLOOD BY AUTOMATED COUNT, [place]      4.4 - 11.3 x10*3/uL 7.7    nRBC      0.0 - 0.0 /100 WBCs 0.0    ERYTHROCYTES (10*6/UL) IN BLOOD BY AUTOMATED COUNT, [place]      4.00 - 5.20 x10*6/uL 4.76    HEMOGLOBIN      12.0 - 16.0 g/dL 13.5    HEMATOCRIT      36.0 - 46.0 % 43.5    MCV      80 - 100 fL 91    MCH      26.0 - 34.0 pg 28.4    MCHC      32.0 - 36.0 g/dL 31.0 (L)    RED CELL DISTRIBUTION WIDTH      11.5 - 14.5 % 13.3    PLATELETS (10*3/UL) IN BLOOD AUTOMATED COUNT, [place]      150 - 450 x10*3/uL 301    NEUTROPHILS/100 LEUKOCYTES IN BLOOD BY AUTOMATED COUNT, [place]      40.0 - 80.0 % 49.3    Immature Granulocytes %, Automated      0.0 - 0.9 % 0.4    Lymphocytes %      13.0 - 44.0 % 41.1    Monocytes %      2.0 - 10.0 % 6.7    Eosinophils %      0.0 - 6.0 % 1.7    Basophils %      0.0 - 2.0 % 0.8    NEUTROPHILS (10*3/UL) IN BLOOD BY AUTOMATED  COUNT, [place]      1.20 - 7.70 x10*3/uL 3.81    Immature Granulocytes Absolute, Automated      0.00 - 0.70 x10*3/uL 0.03    Lymphocytes Absolute      1.20 - 4.80 x10*3/uL 3.18    Monocytes Absolute      0.10 - 1.00 x10*3/uL 0.52    Eosinophils Absolute      0.00 - 0.70 x10*3/uL 0.13    Basophils Absolute      0.00 - 0.10 x10*3/uL 0.06    GLUCOSE      74 - 99 mg/dL 97    SODIUM      136 - 145 mmol/L 141    POTASSIUM      3.5 - 5.3 mmol/L 4.2    CHLORIDE      98 - 107 mmol/L 105    Bicarbonate      21 - 32 mmol/L 29    Anion Gap      10 - 20 mmol/L 11    Blood Urea Nitrogen      6 - 23 mg/dL 10    Creatinine      0.50 - 1.05 mg/dL 0.59    EGFR      >60 mL/min/1.73m*2 >90    Calcium      8.6 - 10.3 mg/dL 9.2    Albumin      3.4 - 5.0 g/dL 4.0    Alkaline Phosphatase      33 - 110 U/L 79    Total Protein      6.4 - 8.2 g/dL 6.6    AST      9 - 39 U/L 15    Bilirubin Total      0.0 - 1.2 mg/dL 0.4    ALT      7 - 45 U/L 16    T-SPOT. TB Interpretation      Negative  Negative    Panel A Spot Count 0    Panel B Spot Count 1    NIL(NEG) Control Spot Count Passed    POS Control Spot Count Passed    Scan Result Vectra 39 (moderate)   C1Q Complement      10.3 - 20.5 mg/dL 14.2    Anti-DNA (DS)      <5.0 IU/mL <1.0    C4 Complement      10 - 50 mg/dL 22    C3 Complement      87 - 200 mg/dL 149    B. burgdorferi VlsE1/pepC10 Abs, RADHA      <=0.90 IV 0.22    Vitamin D, 25-Hydroxy, Total      30 - 100 ng/mL 12 (L)    Sed Rate      0 - 30 mm/h 17    C-Reactive Protein      <1.00 mg/dL 0.78    Creatine Kinase      0 - 215 U/L 45          Assessment/Plan  1. Seropositive rheumatoid arthritis of multiple sites (Multi)    2. Plaque psoriasis    3. Positive KRISTAL (antinuclear antibody)    4. Vitamin D deficiency    5. Other fatigue    6. Gastroesophageal reflux disease, unspecified whether esophagitis present    7. Dizziness    8. Chronic pain of both knees         Orders Placed This Encounter   Procedures    XR knee 3 views bilateral     C1Q Complement    C3 Complement    C4 Complement    Anti-DNA Antibody, Double-Stranded    CBC and Auto Differential    Comprehensive Metabolic Panel    C-Reactive Protein    Creatine Kinase    Creatinine, Urine Random    Albumin , Urine Random    Iron and TIBC    Ferritin    Vitamin B12    Vectra; [facility]; [id number] - Miscellaneous Test    Urinalysis with Reflex Culture and Microscopic    Sedimentation Rate    Vitamin D 25-Hydroxy,Total (for eval of Vitamin D levels)    Referral to ENT          Since last appt, adherent and tolerating Orencia 125 mg weekly,  mg daily, and Otezla 30 mg daily.  Fingers DIPs, elbows, toes with L 2nd toe deformities  AM stiffness at least 60 min.   Difficulty raising arms  Ibuprofen and Tylenol  Second biopsy from leg showed porokeratosis  Lac Hydrin did not work  Denies any recent or current infection.  Not on any glucocorticoids.  ROS+ for fatigue, blurry vision, swollen glands/nodes, MARIE, sun sens, joint pain/swelling/stiffness, numbness/tingling,   Advised on exercise, recommend quad strengthening.  Rapid 3 consistent with high severity.  Labs reviewed  D/w pt tx options and decided on   Switch to Orencia infusion due to lack of full efficacy with current regimen.  dizziness spells positional. ?vertigo, post recurrent ear infections and on immunosuppression- recommend ENT eval- ordered.  Podiatry eval  XR knees  Continue Otezla which has been helpful.  Continue HCQ  Advised of possible side effects and importance of monitoring.   All questions answered.  Patient to follow up with primary care provider regarding all other medical issues not addressed today and for medical chart updating.     Allison Valentine MD      Patient Care Team:  Luis Carlos Mason DO as PCP - General (Family Medicine)  Luis Carlos Mason DO as PCP - Employee [employer] PCP  Allison Valentine MD as Consulting Physician (Rheumatology)

## 2024-06-03 ENCOUNTER — SPECIALTY PHARMACY (OUTPATIENT)
Dept: PHARMACY | Facility: CLINIC | Age: 54
End: 2024-06-03

## 2024-06-03 PROCEDURE — RXMED WILLOW AMBULATORY MEDICATION CHARGE

## 2024-06-04 ENCOUNTER — PHARMACY VISIT (OUTPATIENT)
Dept: PHARMACY | Facility: CLINIC | Age: 54
End: 2024-06-04
Payer: COMMERCIAL

## 2024-06-06 ENCOUNTER — DOCUMENTATION (OUTPATIENT)
Dept: INFUSION THERAPY | Facility: CLINIC | Age: 54
End: 2024-06-06
Payer: COMMERCIAL

## 2024-06-06 NOTE — PROGRESS NOTES
"Patient to be scheduled for  New Start of Orencia Infusions. - Is on a low dose weekly/ This is an induction of 1,000 mg dose.   For Diagnosis: Rheumatoid Arthritis     Dosing is weight based at:     >100 k,000 mg  With a Dosing weight of: 109 kg  For a total dose of: 1000 mg every 2 weeks x3 (induction) then every 4 weeks thereafter (maintenance)    Labs…  T-Spot Drawn/Results:   Lab Results   Component Value Date    TBSIN Negative 05/10/2024    TBGRES Negative  Reference range: NEGATIVE   10/27/2021      Hep B SAg Drawn/Results:   Lab Results   Component Value Date    HEPBSAG NEGATIVE 2022      No results found for: \"NONUHFIRE\", \"NONUHSWGH\", \"NONUHFISH\", \"EXTHEPBSAG\"    Premeds ordered? No    Does the patient have a diagnosis of COPD? No  (If yes assure provider aware and okay to proceed. May cause COPD exacerbations)   Patient Active Problem List   Diagnosis    Arthritis with psoriasis (Multi)    CVI (common variable immunodeficiency) (Multi)    Edema    Lipoedema    Neuropathy    Obesity    Plaque psoriasis    Seropositive rheumatoid arthritis of multiple sites (Multi)    Vitamin D deficiency    Skin tear of lower leg without complication, right, initial encounter    Chronic frontal sinusitis    Urticaria    Attention and concentration deficit    Acute URI    ADHD (attention deficit hyperactivity disorder) evaluation        Urine Hcg test ordered prior to first infusion? Yes (If female pt <60 years of age and with reproductive capability)  (If urine Hcg test ordered please instruct pt upon scheduling to drink 32 ounces of water 1 hour before arrival so bladder is full for needed urine sample)    Last infusion received: Due: OK to schedule once auth is approved.     This result meets treatment criteria.    "

## 2024-06-08 ENCOUNTER — LAB (OUTPATIENT)
Dept: LAB | Facility: LAB | Age: 54
End: 2024-06-08
Payer: COMMERCIAL

## 2024-06-10 ENCOUNTER — APPOINTMENT (OUTPATIENT)
Dept: RHEUMATOLOGY | Facility: CLINIC | Age: 54
End: 2024-06-10
Payer: COMMERCIAL

## 2024-06-13 DIAGNOSIS — R42 DIZZINESS: Primary | ICD-10-CM

## 2024-06-13 RX ORDER — METHYLPREDNISOLONE 4 MG/1
TABLET ORAL
Qty: 21 TABLET | Refills: 1 | Status: SHIPPED | OUTPATIENT
Start: 2024-06-13

## 2024-06-13 RX ORDER — MECLIZINE HYDROCHLORIDE 25 MG/1
25 TABLET ORAL 3 TIMES DAILY PRN
Qty: 90 TABLET | Refills: 3 | Status: SHIPPED | OUTPATIENT
Start: 2024-06-13

## 2024-06-14 ENCOUNTER — APPOINTMENT (OUTPATIENT)
Dept: RHEUMATOLOGY | Facility: CLINIC | Age: 54
End: 2024-06-14
Payer: COMMERCIAL

## 2024-06-14 DIAGNOSIS — M05.79 SEROPOSITIVE RHEUMATOID ARTHRITIS OF MULTIPLE SITES (MULTI): ICD-10-CM

## 2024-06-14 NOTE — TELEPHONE ENCOUNTER
RECIVED FAX FROM [facility] DENYING ORENCIA INFUSION. PT NOTIFIED & PT CALLED [facility]. PER PT YOU CAN ORDER THE ORENCIA CLICKJECT FOR TWICE WEEKLY as DISCUSSED & [facility] STATED THIS MIGHT GET APPROVED. PT ASKING FOR THIS TO BE DONE. CANNOT CHANGE SIG ON ORENCIA FOR YOU, DROPPED CURRENT RX. PLEASE SEND T  SPECIALTY FOR PA

## 2024-06-15 RX ORDER — ABATACEPT 125 MG/ML
125 INJECTION, SOLUTION SUBCUTANEOUS 2 TIMES WEEKLY
Qty: 8 ML | Refills: 11 | Status: SHIPPED | OUTPATIENT
Start: 2024-06-17 | End: 2025-06-17

## 2024-06-18 ENCOUNTER — APPOINTMENT (OUTPATIENT)
Dept: BEHAVIORAL HEALTH | Facility: CLINIC | Age: 54
End: 2024-06-18
Payer: COMMERCIAL

## 2024-06-18 DIAGNOSIS — F90.0 ATTENTION DEFICIT HYPERACTIVITY DISORDER, INATTENTIVE TYPE, MILD: ICD-10-CM

## 2024-06-18 PROCEDURE — 1036F TOBACCO NON-USER: CPT | Performed by: PSYCHOLOGIST

## 2024-06-18 PROCEDURE — 90837 PSYTX W PT 60 MINUTES: CPT | Performed by: PSYCHOLOGIST

## 2024-06-18 NOTE — PROGRESS NOTES
12:00pm to 12:55pm  Met with client today for her individual session via telehealth.  Client was given an ADHD specific assessment interview.  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  Client will be given her diagnosis during the next session along with a psychoeducation.  It should be noted that clients new diagnosis has been added to her chart.

## 2024-06-19 ENCOUNTER — SPECIALTY PHARMACY (OUTPATIENT)
Dept: PHARMACY | Facility: CLINIC | Age: 54
End: 2024-06-19

## 2024-06-24 ENCOUNTER — SPECIALTY PHARMACY (OUTPATIENT)
Dept: PHARMACY | Facility: CLINIC | Age: 54
End: 2024-06-24

## 2024-06-26 ENCOUNTER — APPOINTMENT (OUTPATIENT)
Dept: PODIATRY | Facility: CLINIC | Age: 54
End: 2024-06-26
Payer: COMMERCIAL

## 2024-07-02 ENCOUNTER — SPECIALTY PHARMACY (OUTPATIENT)
Dept: PHARMACY | Facility: CLINIC | Age: 54
End: 2024-07-02

## 2024-07-02 PROCEDURE — RXMED WILLOW AMBULATORY MEDICATION CHARGE

## 2024-07-09 ENCOUNTER — APPOINTMENT (OUTPATIENT)
Dept: BEHAVIORAL HEALTH | Facility: CLINIC | Age: 54
End: 2024-07-09
Payer: COMMERCIAL

## 2024-07-09 DIAGNOSIS — F90.0 ATTENTION DEFICIT HYPERACTIVITY DISORDER, INATTENTIVE TYPE, MILD: ICD-10-CM

## 2024-07-09 DIAGNOSIS — M05.9 SEROPOSITIVE RHEUMATOID ARTHRITIS (MULTI): ICD-10-CM

## 2024-07-09 PROCEDURE — 1036F TOBACCO NON-USER: CPT | Performed by: PSYCHOLOGIST

## 2024-07-09 PROCEDURE — 90834 PSYTX W PT 45 MINUTES: CPT | Performed by: PSYCHOLOGIST

## 2024-07-09 RX ORDER — METHYLPREDNISOLONE ACETATE 40 MG/ML
80 INJECTION, SUSPENSION INTRA-ARTICULAR; INTRALESIONAL; INTRAMUSCULAR; SOFT TISSUE ONCE
Status: SHIPPED | OUTPATIENT
Start: 2024-07-09

## 2024-07-09 NOTE — PROGRESS NOTES
12:00pm to 12:45pm  Met with client today for her individual session via [employer].  Client was given the results of her ADHD testing.  Client was also given a psychoeducation around her new diagnosis.  Recommended that client participate in counseling as well as med management in order to manage her symptoms.  Client was given information during the psychoeducation about both.  Client was also emailed (in an encrypted email) a summary letter stating that she was tested, her diagnosis, and what are the recommendations.    Client's case is currently being closed as her assessment has been completed.   Never smoker

## 2024-07-10 ENCOUNTER — E-VISIT (OUTPATIENT)
Dept: PRIMARY CARE | Facility: CLINIC | Age: 54
End: 2024-07-10
Payer: COMMERCIAL

## 2024-07-12 ENCOUNTER — TELEMEDICINE (OUTPATIENT)
Dept: PRIMARY CARE | Facility: CLINIC | Age: 54
End: 2024-07-12
Payer: COMMERCIAL

## 2024-07-12 VITALS
TEMPERATURE: 97.6 F | WEIGHT: 240 LBS | OXYGEN SATURATION: 97 % | HEART RATE: 64 BPM | BODY MASS INDEX: 39.99 KG/M2 | HEIGHT: 65 IN

## 2024-07-12 DIAGNOSIS — D83.9: ICD-10-CM

## 2024-07-12 DIAGNOSIS — L30.4 INTERTRIGO: ICD-10-CM

## 2024-07-12 DIAGNOSIS — F90.0 ATTENTION DEFICIT HYPERACTIVITY DISORDER, INATTENTIVE TYPE, MILD: Primary | ICD-10-CM

## 2024-07-12 DIAGNOSIS — M05.79 SEROPOSITIVE RHEUMATOID ARTHRITIS OF MULTIPLE SITES (MULTI): ICD-10-CM

## 2024-07-12 DIAGNOSIS — L40.50 ARTHRITIS WITH PSORIASIS (MULTI): ICD-10-CM

## 2024-07-12 PROCEDURE — 99442 PR PHYS/QHP TELEPHONE EVALUATION 11-20 MIN: CPT | Performed by: FAMILY MEDICINE

## 2024-07-12 RX ORDER — NYSTATIN 100000 [USP'U]/G
1 POWDER TOPICAL 2 TIMES DAILY
Qty: 60 G | Refills: 1 | Status: SHIPPED | OUTPATIENT
Start: 2024-07-12 | End: 2025-07-12

## 2024-07-12 RX ORDER — DEXTROAMPHETAMINE SACCHARATE, AMPHETAMINE ASPARTATE MONOHYDRATE, DEXTROAMPHETAMINE SULFATE AND AMPHETAMINE SULFATE 1.25; 1.25; 1.25; 1.25 MG/1; MG/1; MG/1; MG/1
5 CAPSULE, EXTENDED RELEASE ORAL EVERY MORNING
Qty: 30 CAPSULE | Refills: 0 | Status: SHIPPED | OUTPATIENT
Start: 2024-07-12 | End: 2024-08-11

## 2024-07-12 ASSESSMENT — PAIN SCALES - GENERAL: PAINLEVEL: 0-NO PAIN

## 2024-07-12 ASSESSMENT — PATIENT HEALTH QUESTIONNAIRE - PHQ9
1. LITTLE INTEREST OR PLEASURE IN DOING THINGS: NOT AT ALL
SUM OF ALL RESPONSES TO PHQ9 QUESTIONS 1 AND 2: 0
2. FEELING DOWN, DEPRESSED OR HOPELESS: NOT AT ALL

## 2024-07-12 NOTE — ASSESSMENT & PLAN NOTE
- Recent assessment completed by psychology reviewed and discussed  -Agree with recommendations to continue with counseling  -Will initiate trial of Adderall XR 5mg daily with plans to monitor effectiveness with follow-up in 1 month  -Going forward, it may be to her benefit to link to a psychiatrist if more significant medication management is required

## 2024-07-12 NOTE — PROGRESS NOTES
Outpatient Visit Note    Chief Complaint   Patient presents with    Follow-up     Start tx for ADHD       With patient's permission, this is a Telemedicine visit with audio only. The provider and patient participated in this telemedicine encounter.    HPI:  Sue Schafer is a 53 y.o. female who presents to the office via telemedicine encounter to discuss recent ADHD diagnosis with psychology.    Per chart review, patient recently had consultation with [facility] at which time screening was performed for attention deficit concerns.  At that time her screenings they noted diagnosis of mild ADHD, inattentive type to which they stated she would benefit from medication management as well as counseling.  She reports interest in starting stimulant therapy.  Expresses interest in using medication during workweek as this is when her symptoms create most impact on her day-to-day activities.  Does have interest in pursuing trial of Adderall as she feels that this may offer better opportunity for medication being out of her system during the weekend.  Denies having ever been on stimulant therapy in the past.    Separately, patient does note underlying skin irritation, typically in the creases of her skin on her torso, particularly under her breasts.  Does have concerns for possible fungal infection to which she is requesting nystatin powder.    Patient otherwise continues to follow regularly with her established rheumatologist.  Has been compliant with medication regimen denying any recent symptom exacerbation.          Current Medications  Current Outpatient Medications   Medication Instructions    albuterol (Ventolin HFA) 90 mcg/actuation inhaler PRN    amphetamine-dextroamphetamine XR (Adderall XR) 5 mg 24 hr capsule 5 mg, oral, Every morning, Do not crush or chew.    apremilast (Otezla) 30 mg tablet TAKE ONE (1) TABLET BY MOUTH TWICE DAILY    cetirizine (ZyrTEC) 10 mg capsule 2 times a day.    cyclobenzaprine  (Flexeril) 10 mg tablet Take 1 tablet (10 mg) by mouth. PRN    ergocalciferol (VITAMIN D-2) 50,000 Units, oral, 2 times weekly, 1 capsule po weekly for 90 days    fluconazole (Diflucan) 150 mg tablet Monday, Wednesday, Friday as needed for yeast infection.    folic acid (Folvite) 1 mg tablet 1 tablet Orally Once a day for 90 days    furosemide (Lasix) 20 mg tablet 3 times a day as needed.    hydroxychloroquine (PlaqueniL) 200 mg tablet 1 tablet Orally twice daily for 90 days    hydrOXYzine HCL (Atarax) 25 mg tablet oral, As needed    lidocaine (Lidoderm) 5 % patch if needed.    meclizine (ANTIVERT) 25 mg, oral, 3 times daily PRN    methylPREDNISolone (Medrol Dospak) 4 mg tablets Follow schedule on package instructions    nystatin (Mycostatin) 100,000 unit/gram powder 1 Application, Topical, 2 times daily    omeprazole (PRILOSEC) 20 mg, oral, Daily, Do not crush or chew.    Orencia ClickJect 125 mg, subcutaneous, 2 times weekly    potassium chloride ER (Micro-K) 10 mEq ER capsule 1 capsule (10 mEq). Takes with water pill        Allergies  Allergies   Allergen Reactions    Other Rash     Rash from Adhesive from Band-Aid        Past Medical History:   Diagnosis Date    ADHD (attention deficit hyperactivity disorder)     Allergic     Arthritis     Ast[facility] ([facility])     Chronic pain disorder     Dermatitis     GERD (gastroesophageal reflux disease)     Lower extremity edema     Plaque psoriasis     Positive KRISTAL (antinuclear antibody)     Seropositive rheumatoid arthritis of multiple sites (Multi)       Past Surgical History:   Procedure Laterality Date    BLADDER SUSPENSION      BONE BIOPSY  1996    JAW    HYSTERECTOMY  2014    Hysterectomy    WISDOM TOOTH EXTRACTION       Family History   Problem Relation Name Age of Onset    Diabetes Mother Meg     Heart disease Mother Meg         Stent    Rosacea Mother Meg     Miscarriages / Stillbirths Mother Meg      labor Mother Meg      Stroke Father Woodward sr.     Multiple sclerosis Father Holland sr.     Alcohol abuse Father Woodward sr.     Hypertension Father Woodward sr.     Other (PMR) Sister Emily     Fibromyalgia Sister Emily     Arthritis Sister Emily      labor Sister Emily     Ovarian cancer Sister Maryjo     Breast cancer Sister Ying      labor Sister Ying     Psoriasis Sister      Crohn's disease Sister      Multiple sclerosis Sister      Polymyalgia rheumatica Brother Ray     Anesthesia related problems Brother Ray     Multiple sclerosis Brother Woodward     Bipolar disorder Brother Woodward     Asthma Brother Woodward     Multiple sclerosis Brother      Congenital heart disease Daughter      Other (Mental Health) Son Ti     Learning disabilities Son Ti     Mental illness Son Ti     Breast cancer Maternal Grandmother Gracia     Asthma Maternal Grandmother Gracia     Diabetes Maternal Grandmother Gracia     Emphysema Maternal Grandmother Gracia     Heart attack Maternal Grandmother Gracia     Heart disease Maternal Grandmother Gracia     Diabetes Maternal Grandfather Jayden     Kidney disease Maternal Grandfather Jayden     Breast cancer Other          grandmother     Social History     Tobacco Use    Smoking status: Never    Smokeless tobacco: Never   Vaping Use    Vaping status: Never Used   Substance Use Topics    Alcohol use: Not Currently     Comment: Rarely    Drug use: Never     Tobacco Use: Low Risk  (2024)    Patient History     Smoking Tobacco Use: Never     Smokeless Tobacco Use: Never     Passive Exposure: Not on file        ROS  All pertinent positive symptoms are included in the history of present illness.  All other systems have been reviewed and are negative and noncontributory to this patient's current ailments.    VITAL SIGNS  Vitals:    24 0916   Pulse: 64   Temp: 36.4 °C (97.6 °F)   SpO2: 97%     Vitals:    24 09   Weight: 109 kg (240 lb)      Body mass index is 39.94 kg/m².    Patient is unable to provide    PHYSICAL EXAM  Telemedicine visit, no other exam component done.      Assessment/Plan   Problem List Items Addressed This Visit             ICD-10-CM    Arthritis with psoriasis (Multi) L40.50     - Symptoms stable on current regimen as managed by rheumatology         CVI (common variable immunodeficiency) (Multi) D83.9     - Will continue to have immune deficiency monitored by specialist team         Seropositive rheumatoid arthritis of multiple sites (Multi) M05.79     - Continue to follow with established rheumatologist per protocol         Attention deficit hyperactivity disorder, inattentive type, mild - Primary F90.0     - Recent assessment completed by psychology reviewed and discussed  -Agree with recommendations to continue with counseling  -Will initiate trial of Adderall XR 5mg daily with plans to monitor effectiveness with follow-up in 1 month  -Going forward, it may be to her benefit to link to a psychiatrist if more significant medication management is required         Relevant Medications    amphetamine-dextroamphetamine XR (Adderall XR) 5 mg 24 hr capsule    Intertrigo L30.4     - Symptoms seem consistent with probable fungal infection to which antifungal powder prescription sent         Relevant Medications    nystatin (Mycostatin) 100,000 unit/gram powder       Counseling:       Medication education:         Education:  The patient is counseled regarding potential side-effects of all new medications        Understanding:  Patient expressed understanding        Adherence:  No barriers to adherence identified    ** Please excuse any errors in grammar or translation related to this dictation. Voice recognition software was utilized to prepare this document. **

## 2024-07-15 PROBLEM — L30.4 INTERTRIGO: Status: ACTIVE | Noted: 2024-07-15

## 2024-07-15 NOTE — PATIENT INSTRUCTIONS
Problem List Items Addressed This Visit             ICD-10-CM    Arthritis with psoriasis (Multi) L40.50     - Symptoms stable on current regimen as managed by rheumatology         CVI (common variable immunodeficiency) (Multi) D83.9     - Will continue to have immune deficiency monitored by specialist team         Seropositive rheumatoid arthritis of multiple sites (Multi) M05.79     - Continue to follow with established rheumatologist per protocol         Attention deficit hyperactivity disorder, inattentive type, mild - Primary F90.0     - Recent assessment completed by psychology reviewed and discussed  -Agree with recommendations to continue with counseling  -Will initiate trial of Adderall XR 5mg daily with plans to monitor effectiveness with follow-up in 1 month  -Going forward, it may be to her benefit to link to a psychiatrist if more significant medication management is required         Relevant Medications    amphetamine-dextroamphetamine XR (Adderall XR) 5 mg 24 hr capsule    Intertrigo L30.4     - Symptoms seem consistent with probable fungal infection to which antifungal powder prescription sent         Relevant Medications    nystatin (Mycostatin) 100,000 unit/gram powder       Counseling:       Medication education:         Education:  The patient is counseled regarding potential side-effects of all new medications        Understanding:  Patient expressed understanding        Adherence:  No barriers to adherence identified    ** Please excuse any errors in grammar or translation related to this dictation. Voice recognition software was utilized to prepare this document. **

## 2024-07-15 NOTE — ASSESSMENT & PLAN NOTE
- Symptoms seem consistent with probable fungal infection to which antifungal powder prescription sent

## 2024-07-16 ENCOUNTER — PHARMACY VISIT (OUTPATIENT)
Dept: PHARMACY | Facility: CLINIC | Age: 54
End: 2024-07-16
Payer: COMMERCIAL

## 2024-07-19 ENCOUNTER — HOSPITAL ENCOUNTER (OUTPATIENT)
Dept: RADIOLOGY | Facility: CLINIC | Age: 54
Discharge: HOME | End: 2024-07-19
Payer: COMMERCIAL

## 2024-07-19 DIAGNOSIS — M25.562 CHRONIC PAIN OF BOTH KNEES: ICD-10-CM

## 2024-07-19 DIAGNOSIS — G89.29 CHRONIC PAIN OF BOTH KNEES: ICD-10-CM

## 2024-07-19 DIAGNOSIS — M25.561 CHRONIC PAIN OF BOTH KNEES: ICD-10-CM

## 2024-07-19 PROCEDURE — 73562 X-RAY EXAM OF KNEE 3: CPT | Mod: 50

## 2024-07-19 PROCEDURE — 73562 X-RAY EXAM OF KNEE 3: CPT | Mod: BILATERAL PROCEDURE | Performed by: RADIOLOGY

## 2024-07-24 ENCOUNTER — SPECIALTY PHARMACY (OUTPATIENT)
Dept: PHARMACY | Facility: CLINIC | Age: 54
End: 2024-07-24

## 2024-07-24 DIAGNOSIS — M05.79 SEROPOSITIVE RHEUMATOID ARTHRITIS OF MULTIPLE SITES (MULTI): Primary | ICD-10-CM

## 2024-07-24 PROCEDURE — RXMED WILLOW AMBULATORY MEDICATION CHARGE

## 2024-07-25 ENCOUNTER — TELEPHONE (OUTPATIENT)
Dept: RHEUMATOLOGY | Facility: CLINIC | Age: 54
End: 2024-07-25
Payer: COMMERCIAL

## 2024-07-25 NOTE — TELEPHONE ENCOUNTER
I wanted to increase the dose since not well controlled but it was denied and the previous weekly dose was cancelled. She is not doing well off treatment so I sent the weekly dosing so she can get treated while we try to get another PA for either infusion or higher (twice weekly dosing). thanks

## 2024-07-25 NOTE — TELEPHONE ENCOUNTER
----- Message from Miguelangel Dodson sent at 7/24/2024  2:04 PM EDT -----  Regarding: Dose Clarification  Karyna Leiva,    I am reaching out to clarify what dose of the Orencia the patient should be on. I saw in the encounter notes that there was a discussion of doing the clickject twice weekly back in June, however, we received a new prescription today for once weekly dosing. I just want to confirm which prescription we should fill and which we should discontinue.    Thank you    -Jeremi Dodson, PharmD

## 2024-07-29 ENCOUNTER — PHARMACY VISIT (OUTPATIENT)
Dept: PHARMACY | Facility: CLINIC | Age: 54
End: 2024-07-29
Payer: COMMERCIAL

## 2024-07-29 ENCOUNTER — SPECIALTY PHARMACY (OUTPATIENT)
Dept: PHARMACY | Facility: CLINIC | Age: 54
End: 2024-07-29

## 2024-07-30 ENCOUNTER — SPECIALTY PHARMACY (OUTPATIENT)
Dept: PHARMACY | Facility: CLINIC | Age: 54
End: 2024-07-30

## 2024-08-13 ENCOUNTER — PATIENT MESSAGE (OUTPATIENT)
Dept: PRIMARY CARE | Facility: CLINIC | Age: 54
End: 2024-08-13
Payer: COMMERCIAL

## 2024-08-13 DIAGNOSIS — F90.0 ATTENTION DEFICIT HYPERACTIVITY DISORDER, INATTENTIVE TYPE, MILD: ICD-10-CM

## 2024-08-13 RX ORDER — DEXTROAMPHETAMINE SACCHARATE, AMPHETAMINE ASPARTATE MONOHYDRATE, DEXTROAMPHETAMINE SULFATE AND AMPHETAMINE SULFATE 2.5; 2.5; 2.5; 2.5 MG/1; MG/1; MG/1; MG/1
10 CAPSULE, EXTENDED RELEASE ORAL EVERY MORNING
Qty: 30 CAPSULE | Refills: 0 | Status: SHIPPED | OUTPATIENT
Start: 2024-08-13 | End: 2024-09-12

## 2024-08-22 ENCOUNTER — SPECIALTY PHARMACY (OUTPATIENT)
Dept: PHARMACY | Facility: CLINIC | Age: 54
End: 2024-08-22

## 2024-08-22 PROCEDURE — RXMED WILLOW AMBULATORY MEDICATION CHARGE

## 2024-08-23 ENCOUNTER — PHARMACY VISIT (OUTPATIENT)
Dept: PHARMACY | Facility: CLINIC | Age: 54
End: 2024-08-23
Payer: COMMERCIAL

## 2024-09-05 ENCOUNTER — OFFICE VISIT (OUTPATIENT)
Dept: RHEUMATOLOGY | Facility: CLINIC | Age: 54
End: 2024-09-05
Payer: COMMERCIAL

## 2024-09-05 VITALS
WEIGHT: 245 LBS | DIASTOLIC BLOOD PRESSURE: 83 MMHG | HEIGHT: 65 IN | OXYGEN SATURATION: 97 % | SYSTOLIC BLOOD PRESSURE: 126 MMHG | HEART RATE: 71 BPM | BODY MASS INDEX: 40.82 KG/M2

## 2024-09-05 DIAGNOSIS — M77.11 LATERAL EPICONDYLITIS OF RIGHT ELBOW: Primary | ICD-10-CM

## 2024-09-05 PROCEDURE — 99213 OFFICE O/P EST LOW 20 MIN: CPT | Performed by: INTERNAL MEDICINE

## 2024-09-05 PROCEDURE — 1036F TOBACCO NON-USER: CPT | Performed by: INTERNAL MEDICINE

## 2024-09-05 PROCEDURE — 3008F BODY MASS INDEX DOCD: CPT | Performed by: INTERNAL MEDICINE

## 2024-09-05 ASSESSMENT — ROUTINE ASSESSMENT OF PATIENT INDEX DATA (RAPID3)
IN_OUT_TRANSPORT: WITH SOME DIFFICULTY
SEVERITY_SCORE: HIGH SEVERITY (HS)
TURN_FAUCETS_OFF: WITH SOME DIFFICULTY
WEIGHTED_TOTAL_SCORE: 4.43
ON A SCALE OF ONE TO TEN, CONSIDERING ALL THE WAYS IN WHICH ILLNESS AND HEALTH CONDITIONS MAY AFFECT YOU AT THIS TIME, PLEASE INDICATE BELOW HOW YOU ARE DOING:: 4
DRESS_YOURSELF: WITH SOME DIFFICULTY
TOTAL RAPID3 SCORE: 13.3
IN_OUT_BED: WITHOUT ANY DIFFICULTY
GOOD_NIGHTS_SLEEP: WITH SOME DIFFICULTY
FEELINGS_DEPRESSION: WITHOUT ANY DIFFICULTY
SEVERITY_SCORE: 0
FN_SCORE: 2.3
PARTIPATE_RECREATIONAL_ACTIVITIES: WITH SOME DIFFICULTY
FEELINGS_ANXIETY_NERVOUS: WITHOUT ANY DIFFICULTY
WALK_KILOMETERS: WITH SOME DIFFICULTY
ON A SCALE OF ONE TO TEN, HOW MUCH PAIN HAVE YOU HAD BECAUSE OF YOUR CONDITION OVER THE PAST WEEK?: 7
SUM OF QUESTIONS A TO J: 7
LIFT_CUP_TO_MOUTH: WITH MUCH DIFFICULTY
ON A SCALE OF ONE TO TEN, HOW MUCH PAIN HAVE YOU HAD BECAUSE OF YOUR CONDITION OVER THE PAST WEEK?: 7
PICK_CLOTHES_OFF_FLOOR: WITHOUT ANY DIFFICULTY
WALK_FLAT_GROUND: WITHOUT ANY DIFFICULTY
WASH_DRY_BODY: WITHOUT ANY DIFFICULTY
ON A SCALE OF ONE TO TEN, CONSIDERING ALL THE WAYS IN WHICH ILLNESS AND HEALTH CONDITIONS MAY AFFECT YOU AT THIS TIME, PLEASE INDICATE BELOW HOW YOU ARE DOING:: 4

## 2024-09-05 ASSESSMENT — PAIN SCALES - GENERAL: PAINLEVEL: 7

## 2024-09-05 ASSESSMENT — ENCOUNTER SYMPTOMS
OCCASIONAL FEELINGS OF UNSTEADINESS: 1
LOSS OF SENSATION IN FEET: 0
DEPRESSION: 0

## 2024-09-05 NOTE — PROGRESS NOTES
"Chief Complaint:    This 54 y.o. female presents with pain and swelling of the right forearm of four weeks duration.     Subjective:   HPI   Problem:  1: Right forearm pain       The patient had the abrupt onset of right forearm pain of four weeks duration.       The patient recalls lifting wet laundry out of the washing machine when she experienced abrupt forearm pain. The pain has persisted, despite the use of a forearm brace. Self-treatment with IBUPROFEN 600 mg BID has provided no significant benefit. The patient has not used a topical NSAID applied to the arm.       On average, her pain has been 7/10. Pain is \"continuous\", and pain can awaken her.       By way of medical history, the patient is treated for psoriatic arthritis with ORENCIA, OTEZLA, and HYDROXYCHLOROQUINE.     Review of Systems  No change in ROS since patient's previous office visit with Dr. Root.    Objective:   Physical Exam  Examination of the right arm, specifically the forearm at the right elbow, demonstrates pain w/palpation  over the lateral epicondyle at the enthesis of the common extensor tendon (extensor carpi radialis brevis). There is pain with resisted wrist extension. Palpation of the body of the brachioradialis muscle is tender, but muscle testing does not suggest loss of strength. There is some mild-moderate tenderness with palpation over the medial epicondyle.      The shoulder, forearm and wrist/hand is intact.      Neurovascular exam of the right arm is intact.     Assessment:   Right lateral epicondylitis, acute - M77.11    Plan:    PROCEDURE: The patient gave oral permission for injection of the right lateral epicondylitis. The region was prepared by cleansing with POVIDONE + ALCOHOL; the region was topically anesthetized with ETHYL CHLORIDE spray and then the subcutaneous area was anesthetized with 2% LIDOCAINE 2 mL; finally, the region of the enthesis was injected with 0.5% BUPIVACAINE 3 mL + DEPOMEDROL 30 mg w/o " difficulty. The patient expressed no difficulty with the injection.    Follow-up may include a plain radiograph of the right forearm. Depending on patient's recovery over the next week, a MRI of the right arm may be considered if there is delayed resolution, continued pain, or other concern to evaluate for a torn tendon and/or brachioradialis muscle. A fracture is unlikely, but cannot be excluded and may be a consideration for further evaluation. Patient was advised of these potential concerns and understands follow-up procedures may be required. She will follow-up with me in a week or earlier.         Kody Patel MD

## 2024-09-10 DIAGNOSIS — M77.11 LATERAL EPICONDYLITIS OF RIGHT ELBOW: Primary | ICD-10-CM

## 2024-09-10 DIAGNOSIS — T14.8XXA TEARING OF MUSCLE: Primary | ICD-10-CM

## 2024-09-10 RX ORDER — BUPIVACAINE HYDROCHLORIDE 5 MG/ML
3 INJECTION, SOLUTION PERINEURAL ONCE
Status: SHIPPED | OUTPATIENT
Start: 2024-09-05

## 2024-09-10 RX ORDER — METHYLPREDNISOLONE ACETATE 40 MG/ML
30 INJECTION, SUSPENSION INTRA-ARTICULAR; INTRALESIONAL; INTRAMUSCULAR; SOFT TISSUE ONCE
Status: SHIPPED | OUTPATIENT
Start: 2024-09-05

## 2024-09-13 ENCOUNTER — LAB (OUTPATIENT)
Dept: LAB | Facility: LAB | Age: 54
End: 2024-09-13
Payer: COMMERCIAL

## 2024-09-13 DIAGNOSIS — R76.8 POSITIVE ANA (ANTINUCLEAR ANTIBODY): ICD-10-CM

## 2024-09-13 DIAGNOSIS — R53.83 OTHER FATIGUE: ICD-10-CM

## 2024-09-13 DIAGNOSIS — M05.79 SEROPOSITIVE RHEUMATOID ARTHRITIS OF MULTIPLE SITES (MULTI): ICD-10-CM

## 2024-09-13 DIAGNOSIS — E55.9 VITAMIN D DEFICIENCY: ICD-10-CM

## 2024-09-13 LAB
25(OH)D3 SERPL-MCNC: 23 NG/ML (ref 30–100)
ALBUMIN SERPL BCP-MCNC: 4 G/DL (ref 3.4–5)
ALP SERPL-CCNC: 97 U/L (ref 33–110)
ALT SERPL W P-5'-P-CCNC: 17 U/L (ref 7–45)
ANION GAP SERPL CALC-SCNC: 12 MMOL/L (ref 10–20)
AST SERPL W P-5'-P-CCNC: 14 U/L (ref 9–39)
BASOPHILS # BLD AUTO: 0.07 X10*3/UL (ref 0–0.1)
BASOPHILS NFR BLD AUTO: 0.6 %
BILIRUB SERPL-MCNC: 0.4 MG/DL (ref 0–1.2)
BUN SERPL-MCNC: 21 MG/DL (ref 6–23)
C3 SERPL-MCNC: 152 MG/DL (ref 87–200)
C4 SERPL-MCNC: 22 MG/DL (ref 10–50)
CALCIUM SERPL-MCNC: 9.3 MG/DL (ref 8.6–10.6)
CHLORIDE SERPL-SCNC: 103 MMOL/L (ref 98–107)
CK SERPL-CCNC: 49 U/L (ref 0–215)
CO2 SERPL-SCNC: 30 MMOL/L (ref 21–32)
CREAT SERPL-MCNC: 0.84 MG/DL (ref 0.5–1.05)
CRP SERPL-MCNC: 0.7 MG/DL
DSDNA AB SER-ACNC: <1 IU/ML
EGFRCR SERPLBLD CKD-EPI 2021: 83 ML/MIN/1.73M*2
EOSINOPHIL # BLD AUTO: 0.12 X10*3/UL (ref 0–0.7)
EOSINOPHIL NFR BLD AUTO: 1 %
ERYTHROCYTE [DISTWIDTH] IN BLOOD BY AUTOMATED COUNT: 13.1 % (ref 11.5–14.5)
ERYTHROCYTE [SEDIMENTATION RATE] IN BLOOD BY WESTERGREN METHOD: 19 MM/H (ref 0–30)
FERRITIN SERPL-MCNC: 97 NG/ML (ref 8–150)
GLUCOSE SERPL-MCNC: 144 MG/DL (ref 74–99)
HCT VFR BLD AUTO: 43.3 % (ref 36–46)
HGB BLD-MCNC: 13.6 G/DL (ref 12–16)
IMM GRANULOCYTES # BLD AUTO: 0.06 X10*3/UL (ref 0–0.7)
IMM GRANULOCYTES NFR BLD AUTO: 0.5 % (ref 0–0.9)
IRON SATN MFR SERPL: 29 % (ref 25–45)
IRON SERPL-MCNC: 101 UG/DL (ref 35–150)
LYMPHOCYTES # BLD AUTO: 3.97 X10*3/UL (ref 1.2–4.8)
LYMPHOCYTES NFR BLD AUTO: 32.2 %
MCH RBC QN AUTO: 28.9 PG (ref 26–34)
MCHC RBC AUTO-ENTMCNC: 31.4 G/DL (ref 32–36)
MCV RBC AUTO: 92 FL (ref 80–100)
MONOCYTES # BLD AUTO: 0.77 X10*3/UL (ref 0.1–1)
MONOCYTES NFR BLD AUTO: 6.2 %
NEUTROPHILS # BLD AUTO: 7.34 X10*3/UL (ref 1.2–7.7)
NEUTROPHILS NFR BLD AUTO: 59.5 %
NRBC BLD-RTO: 0 /100 WBCS (ref 0–0)
PLATELET # BLD AUTO: 327 X10*3/UL (ref 150–450)
POTASSIUM SERPL-SCNC: 4.1 MMOL/L (ref 3.5–5.3)
PROT SERPL-MCNC: 6.9 G/DL (ref 6.4–8.2)
RBC # BLD AUTO: 4.71 X10*6/UL (ref 4–5.2)
SODIUM SERPL-SCNC: 141 MMOL/L (ref 136–145)
TIBC SERPL-MCNC: 353 UG/DL (ref 240–445)
UIBC SERPL-MCNC: 252 UG/DL (ref 110–370)
VIT B12 SERPL-MCNC: 397 PG/ML (ref 211–911)
WBC # BLD AUTO: 12.3 X10*3/UL (ref 4.4–11.3)

## 2024-09-13 PROCEDURE — 83550 IRON BINDING TEST: CPT

## 2024-09-13 PROCEDURE — 82607 VITAMIN B-12: CPT

## 2024-09-13 PROCEDURE — 83540 ASSAY OF IRON: CPT

## 2024-09-13 PROCEDURE — 82728 ASSAY OF FERRITIN: CPT

## 2024-09-13 PROCEDURE — 86160 COMPLEMENT ANTIGEN: CPT

## 2024-09-13 PROCEDURE — 81490 AUTOIMMUNE RA ALYS 12 BMRK: CPT

## 2024-09-13 PROCEDURE — 82306 VITAMIN D 25 HYDROXY: CPT

## 2024-09-13 PROCEDURE — 36415 COLL VENOUS BLD VENIPUNCTURE: CPT

## 2024-09-13 PROCEDURE — 80053 COMPREHEN METABOLIC PANEL: CPT

## 2024-09-13 PROCEDURE — 85652 RBC SED RATE AUTOMATED: CPT

## 2024-09-13 PROCEDURE — 85025 COMPLETE CBC W/AUTO DIFF WBC: CPT

## 2024-09-13 PROCEDURE — 86140 C-REACTIVE PROTEIN: CPT

## 2024-09-13 PROCEDURE — 82550 ASSAY OF CK (CPK): CPT

## 2024-09-13 PROCEDURE — 86225 DNA ANTIBODY NATIVE: CPT

## 2024-09-18 ENCOUNTER — PATIENT MESSAGE (OUTPATIENT)
Dept: CARE COORDINATION | Facility: CLINIC | Age: 54
End: 2024-09-18
Payer: COMMERCIAL

## 2024-09-19 ENCOUNTER — HOSPITAL ENCOUNTER (OUTPATIENT)
Dept: RADIOLOGY | Facility: CLINIC | Age: 54
Discharge: HOME | End: 2024-09-19
Payer: COMMERCIAL

## 2024-09-19 DIAGNOSIS — T14.8XXA TEARING OF MUSCLE: ICD-10-CM

## 2024-09-19 DIAGNOSIS — F90.0 ATTENTION DEFICIT HYPERACTIVITY DISORDER, INATTENTIVE TYPE, MILD: ICD-10-CM

## 2024-09-19 PROCEDURE — 73218 MRI UPPER EXTREMITY W/O DYE: CPT | Mod: RIGHT SIDE | Performed by: RADIOLOGY

## 2024-09-19 PROCEDURE — 73218 MRI UPPER EXTREMITY W/O DYE: CPT | Mod: RT

## 2024-09-19 NOTE — TELEPHONE ENCOUNTER
Refill:  amphetamine-dextroamphetamine XR (Adderall XR) 10 mg 24 hr capsule Take 1 capsule (10 mg) by mouth once daily in the morning. Do not crush or chew.     Pharm:  [facility] [address]

## 2024-09-20 RX ORDER — DEXTROAMPHETAMINE SACCHARATE, AMPHETAMINE ASPARTATE MONOHYDRATE, DEXTROAMPHETAMINE SULFATE AND AMPHETAMINE SULFATE 2.5; 2.5; 2.5; 2.5 MG/1; MG/1; MG/1; MG/1
10 CAPSULE, EXTENDED RELEASE ORAL EVERY MORNING
Qty: 30 CAPSULE | Refills: 0 | Status: SHIPPED | OUTPATIENT
Start: 2024-09-20 | End: 2024-10-20

## 2024-09-20 NOTE — TELEPHONE ENCOUNTER
Prescription refill sent to cover patient until upcoming appointment where we can see how regimen has been tolerated

## 2024-09-23 ENCOUNTER — SPECIALTY PHARMACY (OUTPATIENT)
Dept: PHARMACY | Facility: CLINIC | Age: 54
End: 2024-09-23

## 2024-09-23 LAB — SCAN RESULT: NORMAL

## 2024-09-23 PROCEDURE — RXMED WILLOW AMBULATORY MEDICATION CHARGE

## 2024-09-24 ENCOUNTER — APPOINTMENT (OUTPATIENT)
Dept: RADIOLOGY | Facility: CLINIC | Age: 54
End: 2024-09-24
Payer: COMMERCIAL

## 2024-09-24 ENCOUNTER — PHARMACY VISIT (OUTPATIENT)
Dept: PHARMACY | Facility: CLINIC | Age: 54
End: 2024-09-24
Payer: COMMERCIAL

## 2024-09-25 LAB — C1Q SERPL-MCNC: 14.1 MG/DL (ref 10.3–20.5)

## 2024-09-26 ENCOUNTER — EVALUATION (OUTPATIENT)
Dept: PHYSICAL THERAPY | Facility: CLINIC | Age: 54
End: 2024-09-26
Payer: COMMERCIAL

## 2024-09-26 DIAGNOSIS — M77.11 LATERAL EPICONDYLITIS, RIGHT ELBOW: ICD-10-CM

## 2024-09-26 PROCEDURE — 97161 PT EVAL LOW COMPLEX 20 MIN: CPT | Mod: GP | Performed by: PHYSICAL THERAPIST

## 2024-09-26 ASSESSMENT — PAIN SCALES - GENERAL: PAINLEVEL_OUTOF10: 6

## 2024-09-26 ASSESSMENT — PAIN - FUNCTIONAL ASSESSMENT: PAIN_FUNCTIONAL_ASSESSMENT: 0-10

## 2024-09-26 NOTE — PROGRESS NOTES
Physical Therapy Evaluation and Treatment      Patient Name: Sue Schafer  MRN: [id number]  Today's Date: 9/26/2024  Time Calculation  Start Time: 0736  Stop Time: 0807  Time Calculation (min): 31 min  Low complexity due to patient's clinical presentation being stable and uncomplicated by any significant comorbidities that may affect rehab tolerance and progression.    Insurance:  Visit number: Eval of 8  Authorization info: Auth Required   Insurance Type: Payor:  EMPLOYEE MEDICAL PLAN / Plan:  EMPLOYEE MEDICAL PLAN [employer] / Product Type: *No Product type* /     Current Problem:   1. Lateral epicondylitis, right elbow  Referral to Physical Therapy    Follow Up In Physical Therapy          Subjective    General:  General  Reason for Referral: R elbow  Referred By: Dr. Robin  General Comment: Pt presents to therapy with R elbow pain. Pt reports she was lifting laundry out of washer and felt a stabbing pain in elbow. She is having difficulty with , reprots shooting painfrom hand all the way into forearm and up past eblow. This injury occured about 8 weeks ago. She does not report any prior injury, RA in R wrist  Precautions:  Precautions  Precautions Comment: None  Pain:  Pain Assessment  Pain Assessment: 0-10  0-10 (Numeric) Pain Score: 6 (10/10 worst)  Pain Type: Acute pain  Pain Location: Elbow (Greatest pain along extensor muscle bulk)  Pain Orientation: Right  Pain Radiating Towards: hand and into upper arm  Prior Level of Function:  Prior Function Per Pt/Caregiver Report  Level of [place]: Independent with ADLs and functional transfers, Independent with homemaking with ambulation    Objective     Elbow    Elbow Palpation/Joint Mobility Assessment  Elbow Palpation/Joint Mobility Comment: highly sensitive along R extensor muscle bulk  Elbow AROM  R elbow flexion: (140°): 118  R elbow extension:(0°): lacking 20 (+ burning)  R elbow pronation: (70°): WNL  R elbow supination: (90°):  reduced 20 (+ burning sensation)  Elbow PROM   Elbow PROM: yes (able to achieve full ROM however muscle spasms and large amount of guarding)  Elbow Strength  R elbow flexion: (5/5): 3+  R elbow extension: (5/5): 4+  R elbow pronation: (5/5): 3  R elbow supination: (5/5): 3   Strength   R  strength: 2    Elbow Special Tests   Elbow Special Tests Comment: unable to test due to muscle guarding and pain    Outcome Measures:        Treatments:  Therapeutic Exercise  Therapeutic Exercise Performed: Yes  Therapeutic Exercise Activity 1: Access Code [email or web address]  - Standing Elbow Flexion Extension AROM  - 1 x daily - 7 x weekly - 3 sets - 10 reps  - Seated Forearm Pronation and Supination AROM  - 1 x daily - 7 x weekly - 3 sets - 10 reps  - Wrist AROM Flexion Extension  - 1 x daily - 7 x weekly - 3 sets - 10 reps  - Thumb Opposition  - 1 x daily - 7 x weekly - 3 sets - 10 reps  - Finger Spreading  - 1 x daily - 7 x weekly - 3 sets - 10 reps  - Seated Scapular Retraction  - 1 x daily - 7 x weekly - 3 sets - 10 reps  - Towel Roll Squeeze  - 1 x daily - 7 x weekly - 3 sets - 10 reps    Assessment   Assessment:  PT Assessment Results: Decreased strength, Decreased range of motion, Pain  Rehab Prognosis: Good  Assessment Comment: Pt is a 54 y.o female presenting to therapy with R elbow pain. Limited with AROM of R elbow motion with high pain levels along with large reduciton in strength due to pain. Highly sensitivty with high guarding throughout R extensor muscle bulk. Pt reports per MRI there is ligament damage but large amount of symptoms are suggestive of lateral epicondyle irritation. At this time pt would benefit from skilled physical therapy in order to prevent further functional decline.    Plan:  Treatment/Interventions: Dry needling, Education/ Instruction, Cryotherapy, Hot pack, Manual therapy, Neuromuscular re-education, Therapeutic activities, Taping techniques, Therapeutic exercises  PT Plan: Skilled PT  PT  Frequency: 1 time per week  Duration: 8 weeks  Onset Date: 09/26/24  Number of Treatments Authorized: Auth Required  Rehab Potential: Good  Plan of Care Agreement: Patient    OP EDUCATION:  Outpatient Education  Individual(s) Educated: Patient  Education Provided: Anatomy, Home Exercise Program, POC  Risk and Benefits Discussed with Patient/Caregiver/Other: yes  Patient/Caregiver Demonstrated Understanding: yes  Plan of Care Discussed and Agreed Upon: yes  Patient Response to Education: Patient/Caregiver Verbalized Understanding of Information, Patient/Caregiver Performed Return Demonstration of Exercises/Activities, Patient/Caregiver Asked Appropriate Questions    Goals:  Active       PT Problem       PT Goal 1       Start:  09/26/24    Expected End:  10/31/24       Pt will be 50% IND with HEP in 4 weeks in order to progress with therapy.          PT Goal 2       Start:  09/26/24    Expected End:  10/31/24       Pt will demonstrated improve AROM of R elbow as follows: 0-140 in 4 weeks to improve mobility required for dressing, bathing, cooking and cleaning.          PT Goal 3       Start:  09/26/24    Expected End:  10/31/24       Pt will improve R elbow strength to 4/5 in 4 weeks in order to improve strength required to lift objects at home including groceries and to improve cleaning tasks.           PT Goal 4       Start:  09/26/24    Expected End:  10/31/24       Pt will reduce pain levels to no more than 3/10 in 4 weeks in order to improve work related tasks.              PT Problem       PT Goal 1       Start:  09/26/24    Expected End:  11/30/24       Pt will be 100% IND with HEP in 8 weeks in order to maintain progress with therapy.           PT Goal 2       Start:  09/26/24    Expected End:  11/30/24       Pt will reduce pain levels to no more than 0/10 in 8 weeks in order to improve work related tasks.          PT Goal 3       Start:  09/26/24    Expected End:  11/30/24       Pt will improve  strength  to at least #50 in 8 weeks required for work tasks, cooking/cleaning and self care.         PT Goal 4       Start:  09/26/24    Expected End:  11/30/24       Pt will demonstrate subjective improvement of ADLs and recreational activities through improved score of 70 on UEFS in 8 weeks for work tasks.

## 2024-09-27 ENCOUNTER — SPECIALTY PHARMACY (OUTPATIENT)
Dept: PHARMACY | Facility: CLINIC | Age: 54
End: 2024-09-27

## 2024-09-27 ENCOUNTER — TELEMEDICINE (OUTPATIENT)
Dept: PRIMARY CARE | Facility: CLINIC | Age: 54
End: 2024-09-27
Payer: COMMERCIAL

## 2024-09-27 VITALS
OXYGEN SATURATION: 97 % | BODY MASS INDEX: 40.44 KG/M2 | WEIGHT: 243 LBS | DIASTOLIC BLOOD PRESSURE: 73 MMHG | SYSTOLIC BLOOD PRESSURE: 118 MMHG | HEART RATE: 57 BPM

## 2024-09-27 DIAGNOSIS — F90.0 ATTENTION DEFICIT HYPERACTIVITY DISORDER, INATTENTIVE TYPE, MILD: Primary | ICD-10-CM

## 2024-09-27 DIAGNOSIS — Z12.31 BREAST CANCER SCREENING BY MAMMOGRAM: ICD-10-CM

## 2024-09-27 PROCEDURE — 99214 OFFICE O/P EST MOD 30 MIN: CPT | Performed by: FAMILY MEDICINE

## 2024-09-27 PROCEDURE — 1036F TOBACCO NON-USER: CPT | Performed by: FAMILY MEDICINE

## 2024-09-27 RX ORDER — DEXTROAMPHETAMINE SACCHARATE, AMPHETAMINE ASPARTATE MONOHYDRATE, DEXTROAMPHETAMINE SULFATE AND AMPHETAMINE SULFATE 2.5; 2.5; 2.5; 2.5 MG/1; MG/1; MG/1; MG/1
20 CAPSULE, EXTENDED RELEASE ORAL EVERY MORNING
Start: 2024-09-27 | End: 2024-10-27

## 2024-09-27 NOTE — ASSESSMENT & PLAN NOTE
- Will plan to increase Adderall regimen to 20 mg daily and doubling up on current prescription  -Please contact office in the next 1 to 2 weeks prior to refill needs to let us know how effectiveness is going  -If there are concerns or issues in interval, please do not hesitate to reach out

## 2024-09-27 NOTE — PROGRESS NOTES
Outpatient Visit Note    Chief Complaint   Patient presents with    Follow-up     ADHD       With patient's permission, this is a Telemedicine visit with audio only. The provider and patient participated in this telemedicine encounter.    HPI:  Sue Schafer is a 54 y.o. female who presents to the office via telemedicine encounter for ADHD follow-up.  She was last seen in the office via telemedicine encounter on 7/12/2024 following initial diagnosis with psychology.    Per chart review, patient recently had consultation with [facility] at which time screening was performed for attention deficit concerns.  At that time her screenings they noted diagnosis of mild ADHD, inattentive type to which they stated she would benefit from medication management as well as counseling.  She reported interest in starting stimulant therapy.  Expressed interest in using medication during workweek as this is when her symptoms create most impact on her day-to-day activities.  Denied having ever been on stimulant therapy in the past.  Initial trial of Adderall XR 5 mg daily was initiated.  Patient reached out stating that medication was tolerated though effectiveness was minimal to which she was increased to 10 mg daily.    Today she reports no significant symptomatic change with increase to 10 mg daily.  Would be interested in further dose adjustment as medication has otherwise been well-tolerated without adverse side effects.  Denies any jitteriness, insomnia or loss of appetite.    Of note, patient has recently been approved to reinitiate routine Orencia treatments.  She was utilizing steroids which were not helpful on ongoing elbow issues.  Did have MRI earlier this month which noted partial tendon tear.  Has been following with orthopedist which she underwent injection and is going through physical therapy.  Has scheduled follow-up to monitor progress.    Is due for mammogram to which she is requesting prescription      Current Medications  Current Outpatient Medications   Medication Instructions    albuterol (Ventolin HFA) 90 mcg/actuation inhaler PRN    amphetamine-dextroamphetamine XR (Adderall XR) 10 mg 24 hr capsule 20 mg, oral, Every morning, Do not crush or chew.    apremilast (Otezla) 30 mg tablet TAKE ONE (1) TABLET BY MOUTH TWICE DAILY    cetirizine (ZyrTEC) 10 mg capsule 2 times a day.    cyclobenzaprine (Flexeril) 10 mg tablet Take 1 tablet (10 mg) by mouth. PRN    ergocalciferol (VITAMIN D-2) 50,000 Units, oral, 2 times weekly, 1 capsule po weekly for 90 days    fluconazole (Diflucan) 150 mg tablet Monday, Wednesday, Friday as needed for yeast infection.    folic acid (Folvite) 1 mg tablet 1 tablet Orally Once a day for 90 days    furosemide (Lasix) 20 mg tablet 3 times a day as needed.    hydroxychloroquine (PlaqueniL) 200 mg tablet 1 tablet Orally twice daily for 90 days    hydrOXYzine HCL (Atarax) 25 mg tablet oral, As needed    lidocaine (Lidoderm) 5 % patch if needed.    nystatin (Mycostatin) 100,000 unit/gram powder 1 Application, Topical, 2 times daily    omeprazole (PRILOSEC) 20 mg, oral, Daily, Do not crush or chew.    Orencia ClickJect 125 mg, subcutaneous, 2 times weekly    potassium chloride ER (Micro-K) 10 mEq ER capsule 1 capsule (10 mEq). Takes with water pill        Allergies  Allergies   Allergen Reactions    Other Rash     Rash from Adhesive from Band-Aid        Past Medical History:   Diagnosis Date    ADHD (attention deficit hyperactivity disorder)     Allergic     Arthritis     Asthma ([facility])     Chronic pain disorder     Dermatitis     GERD (gastroesophageal reflux disease)     Lower extremity edema     Plaque psoriasis     Positive KRISTAL (antinuclear antibody)     Seropositive rheumatoid arthritis of multiple sites (Multi)       Past Surgical History:   Procedure Laterality Date    BLADDER SUSPENSION      BONE BIOPSY  1996    JAW    HYSTERECTOMY  01/21/2014    Hysterectomy    WISDOM TOOTH  EXTRACTION       Family History   Problem Relation Name Age of Onset    Diabetes Mother Meg     Heart disease Mother Meg         Stent    Rosacea Mother Meg     Miscarriages / Stillbirths Mother Meg      labor Mother Meg     Stroke Father [place] sr.     Multiple sclerosis Father [place] sr.     Alcohol abuse Father [place] sr.     Hypertension Father [place] sr.     Other (PMR) Sister Emily     Fibromyalgia Sister Emily     Arthritis Sister Emily      labor Sister Emily     Ovarian cancer Sister Maryjo     Breast cancer Sister Ying      labor Sister Ying     Psoriasis Sister      Crohn's disease Sister      Multiple sclerosis Sister      Polymyalgia rheumatica Brother Ray     Anesthesia related problems Brother Ray     Multiple sclerosis Brother [place]     Bipolar disorder Brother [place]     Asthma Brother [place]     Multiple sclerosis Brother      Congenital heart disease Daughter      Other (Mental Health) Son Ti     Learning disabilities Son Ti     Mental illness Son Ti     Breast cancer Maternal Grandmother Gracia     Asthma Maternal Grandmother Gracia     Diabetes Maternal Grandmother Gracia     Emphysema Maternal Grandmother Gracia     Heart attack Maternal Grandmother Gracia     Heart disease Maternal Grandmother Gracia     Diabetes Maternal Grandfather Jayden     Kidney disease Maternal Grandfather Jayden     Breast cancer Other          grandmother     Social History     Tobacco Use    Smoking status: Never    Smokeless tobacco: Never   Vaping Use    Vaping status: Never Used   Substance Use Topics    Alcohol use: Not Currently     Comment: Rarely    Drug use: Never     Tobacco Use: Low Risk  (2024)    Patient History     Smoking Tobacco Use: Never     Smokeless Tobacco Use: Never     Passive Exposure: Not on file        ROS  All pertinent positive symptoms are included in the history of present illness.  All other systems have been  reviewed and are negative and noncontributory to this patient's current ailments.    VITAL SIGNS  Vitals:    09/27/24 1213   BP: 118/73   Pulse: 57   SpO2: 97%       Vitals:    09/27/24 1213   Weight: 110 kg (243 lb)        Body mass index is 40.44 kg/m².       PHYSICAL EXAM  Telemedicine visit, no other exam component done.      Assessment/Plan   Problem List Items Addressed This Visit             ICD-10-CM    Attention deficit hyperactivity disorder, inattentive type, mild - Primary F90.0     - Will plan to increase Adderall regimen to 20 mg daily and doubling up on current prescription  -Please contact office in the next 1 to 2 weeks prior to refill needs to let us know how effectiveness is going  -If there are concerns or issues in interval, please do not hesitate to reach out         Relevant Medications    amphetamine-dextroamphetamine XR (Adderall XR) 10 mg 24 hr capsule     Other Visit Diagnoses         Codes    Breast cancer screening by mammogram     Z12.31    Relevant Orders    BI mammo bilateral screening tomosynthesis            Counseling:       Medication education:         Education:  The patient is counseled regarding potential side-effects of all new medications        Understanding:  Patient expressed understanding        Adherence:  No barriers to adherence identified    ** Please excuse any errors in grammar or translation related to this dictation. Voice recognition software was utilized to prepare this document. **

## 2024-10-02 ENCOUNTER — APPOINTMENT (OUTPATIENT)
Dept: PHYSICAL THERAPY | Facility: CLINIC | Age: 54
End: 2024-10-02
Payer: COMMERCIAL

## 2024-10-03 ENCOUNTER — HOSPITAL ENCOUNTER (OUTPATIENT)
Dept: RADIOLOGY | Facility: HOSPITAL | Age: 54
Discharge: HOME | End: 2024-10-03
Payer: COMMERCIAL

## 2024-10-03 ENCOUNTER — TREATMENT (OUTPATIENT)
Dept: PHYSICAL THERAPY | Facility: CLINIC | Age: 54
End: 2024-10-03
Payer: COMMERCIAL

## 2024-10-03 VITALS — BODY MASS INDEX: 40.48 KG/M2 | WEIGHT: 243 LBS | HEIGHT: 65 IN

## 2024-10-03 DIAGNOSIS — M77.11 LATERAL EPICONDYLITIS, RIGHT ELBOW: ICD-10-CM

## 2024-10-03 DIAGNOSIS — Z12.31 BREAST CANCER SCREENING BY MAMMOGRAM: ICD-10-CM

## 2024-10-03 PROCEDURE — 97140 MANUAL THERAPY 1/> REGIONS: CPT | Mod: GP,CQ

## 2024-10-03 PROCEDURE — 77063 BREAST TOMOSYNTHESIS BI: CPT

## 2024-10-03 PROCEDURE — 97110 THERAPEUTIC EXERCISES: CPT | Mod: GP,CQ

## 2024-10-03 PROCEDURE — 77067 SCR MAMMO BI INCL CAD: CPT

## 2024-10-03 ASSESSMENT — PAIN - FUNCTIONAL ASSESSMENT: PAIN_FUNCTIONAL_ASSESSMENT: 0-10

## 2024-10-03 ASSESSMENT — PAIN SCALES - GENERAL: PAINLEVEL_OUTOF10: 6

## 2024-10-03 NOTE — PROGRESS NOTES
Physical Therapy Treatment    Patient Name: Sue Schafer  MRN: [id number]  Today's Date: 10/3/2024  Time Calculation  Start Time: 0920  Stop Time: 0958  Time Calculation (min): 38 min  PT Therapeutic Procedures Time Entry  Manual Therapy Time Entry: 25  Therapeutic Exercise Time Entry: 13    Insurance:  Visit number: 1 of 8  Authorization info: 10-20 visits completed  Insurance Type: Payor:  EMPLOYEE MEDICAL PLAN / Plan:  EMPLOYEE MEDICAL PLAN CONSUMER [employer] / Product Type: *No Product type* /     Current Problem   1. Lateral epicondylitis, right elbow  Follow Up In Physical Therapy          Subjective   General    Patient reports a lot of pain in her forearm which shoots up into shoulder and down into hand, reports her R pinky is tingling and cold. Unable to  anything which is impacting her work and wants to be able to drive motorcycle but unable to  break.     Precautions:   None    Pain   Pain Assessment: 0-10  0-10 (Numeric) Pain Score: 6  Pain Type: Acute pain  Pain Location: Elbow  Pain Orientation: Right    Post Treatment Pain Level   No change    Objective   Highly sensitive to palpation/movement with decreased wrist and forearm ROM    Treatments:  Therapeutic Exercise:  Therapeutic Exercise  Therapeutic Exercise Performed: Yes  Therapeutic Exercise Activity 1: towel   Therapeutic Exercise Activity 2: wrist flex/ext AROM  Therapeutic Exercise Activity 3: AROM forearm pronation/supination    Manual:  Manual Therapy  Manual Therapy Performed: Yes  Manual Therapy Activity 1: STM with wrist extensors  Manual Therapy Activity 2: gentle wrist ext stretch    Manual with MHP PRN      Assessment   Assessment:    Patient has limited tolerance to movements with painful end ranges for all performed tasks. Able to tolerate light STM and stretching with education throughout session for importance of movements, highly guarded. Will continue to progress as tolerated.     Plan:    Forearm, elbow, wrist  ROM and strength    OP EDUCATION:   Review of HEP    Goals:   Active       PT Problem       PT Goal 1       Start:  09/26/24    Expected End:  10/31/24       Pt will be 50% IND with HEP in 4 weeks in order to progress with therapy.          PT Goal 2       Start:  09/26/24    Expected End:  10/31/24       Pt will demonstrated improve AROM of R elbow as follows: 0-140 in 4 weeks to improve mobility required for dressing, bathing, cooking and cleaning.          PT Goal 3       Start:  09/26/24    Expected End:  10/31/24       Pt will improve R elbow strength to 4/5 in 4 weeks in order to improve strength required to lift objects at home including groceries and to improve cleaning tasks.           PT Goal 4       Start:  09/26/24    Expected End:  10/31/24       Pt will reduce pain levels to no more than 3/10 in 4 weeks in order to improve work related tasks.              PT Problem       PT Goal 1       Start:  09/26/24    Expected End:  11/30/24       Pt will be 100% IND with HEP in 8 weeks in order to maintain progress with therapy.           PT Goal 2       Start:  09/26/24    Expected End:  11/30/24       Pt will reduce pain levels to no more than 0/10 in 8 weeks in order to improve work related tasks.          PT Goal 3       Start:  09/26/24    Expected End:  11/30/24       Pt will improve  strength to at least #50 in 8 weeks required for work tasks, cooking/cleaning and self care.         PT Goal 4       Start:  09/26/24    Expected End:  11/30/24       Pt will demonstrate subjective improvement of ADLs and recreational activities through improved score of 70 on UEFS in 8 weeks for work tasks.

## 2024-10-04 ENCOUNTER — APPOINTMENT (OUTPATIENT)
Dept: RHEUMATOLOGY | Facility: CLINIC | Age: 54
End: 2024-10-04
Payer: COMMERCIAL

## 2024-10-04 ENCOUNTER — OFFICE VISIT (OUTPATIENT)
Dept: RHEUMATOLOGY | Facility: CLINIC | Age: 54
End: 2024-10-04
Payer: COMMERCIAL

## 2024-10-04 VITALS
OXYGEN SATURATION: 100 % | DIASTOLIC BLOOD PRESSURE: 74 MMHG | HEART RATE: 62 BPM | SYSTOLIC BLOOD PRESSURE: 121 MMHG | BODY MASS INDEX: 40.27 KG/M2 | WEIGHT: 242 LBS

## 2024-10-04 DIAGNOSIS — E55.9 VITAMIN D DEFICIENCY: ICD-10-CM

## 2024-10-04 DIAGNOSIS — Z78.0 POSTMENOPAUSAL: ICD-10-CM

## 2024-10-04 DIAGNOSIS — R76.8 POSITIVE ANA (ANTINUCLEAR ANTIBODY): ICD-10-CM

## 2024-10-04 DIAGNOSIS — Z79.899 ENCOUNTER FOR LONG-TERM (CURRENT) USE OF MEDICATIONS: ICD-10-CM

## 2024-10-04 DIAGNOSIS — R60.9 DEPENDENT EDEMA: Primary | ICD-10-CM

## 2024-10-04 DIAGNOSIS — M05.79 SEROPOSITIVE RHEUMATOID ARTHRITIS OF MULTIPLE SITES (MULTI): ICD-10-CM

## 2024-10-04 PROCEDURE — 99213 OFFICE O/P EST LOW 20 MIN: CPT | Performed by: INTERNAL MEDICINE

## 2024-10-04 RX ORDER — HYDROXYCHLOROQUINE SULFATE 200 MG/1
TABLET, FILM COATED ORAL
Qty: 180 TABLET | Refills: 3 | Status: SHIPPED | OUTPATIENT
Start: 2024-10-04

## 2024-10-04 RX ORDER — FUROSEMIDE 20 MG/1
20 TABLET ORAL 3 TIMES DAILY PRN
Qty: 90 TABLET | Refills: 3 | Status: SHIPPED | OUTPATIENT
Start: 2024-10-04

## 2024-10-04 RX ORDER — HYDROXYZINE HYDROCHLORIDE 25 MG/1
25 TABLET, FILM COATED ORAL EVERY 8 HOURS PRN
Qty: 90 TABLET | Refills: 3 | Status: SHIPPED | OUTPATIENT
Start: 2024-10-04

## 2024-10-04 RX ORDER — LIDOCAINE 50 MG/G
1 PATCH TOPICAL DAILY
Qty: 30 PATCH | Refills: 11 | Status: SHIPPED | OUTPATIENT
Start: 2024-10-04

## 2024-10-04 ASSESSMENT — ROUTINE ASSESSMENT OF PATIENT INDEX DATA (RAPID3)
DRESS_YOURSELF: WITH SOME DIFFICULTY
WEIGHTED_TOTAL_SCORE: 4.57
IN_OUT_BED: WITHOUT ANY DIFFICULTY
SEVERITY_SCORE: HIGH SEVERITY (HS)
PICK_CLOTHES_OFF_FLOOR: WITH SOME DIFFICULTY
ON A SCALE OF ONE TO TEN, HOW MUCH PAIN HAVE YOU HAD BECAUSE OF YOUR CONDITION OVER THE PAST WEEK?: 6
WASH_DRY_BODY: WITH SOME DIFFICULTY
WALK_FLAT_GROUND: WITHOUT ANY DIFFICULTY
SEVERITY_SCORE: 0
ON A SCALE OF ONE TO TEN, CONSIDERING ALL THE WAYS IN WHICH ILLNESS AND HEALTH CONDITIONS MAY AFFECT YOU AT THIS TIME, PLEASE INDICATE BELOW HOW YOU ARE DOING:: 5
ON A SCALE OF ONE TO TEN, CONSIDERING ALL THE WAYS IN WHICH ILLNESS AND HEALTH CONDITIONS MAY AFFECT YOU AT THIS TIME, PLEASE INDICATE BELOW HOW YOU ARE DOING:: 5
WALK_KILOMETERS: WITH SOME DIFFICULTY
FEELINGS_ANXIETY_NERVOUS: WITHOUT ANY DIFFICULTY
LIFT_CUP_TO_MOUTH: WITH SOME DIFFICULTY
TURN_FAUCETS_OFF: WITH SOME DIFFICULTY
TOTAL RAPID3 SCORE: 13.7
FEELINGS_DEPRESSION: WITHOUT ANY DIFFICULTY
PARTIPATE_RECREATIONAL_ACTIVITIES: WITH MUCH DIFFICULTY
SUM OF QUESTIONS A TO J: 8
FN_SCORE: 2.7
IN_OUT_TRANSPORT: WITHOUT ANY DIFFICULTY
GOOD_NIGHTS_SLEEP: WITH SOME DIFFICULTY
ON A SCALE OF ONE TO TEN, HOW MUCH PAIN HAVE YOU HAD BECAUSE OF YOUR CONDITION OVER THE PAST WEEK?: 6

## 2024-10-04 NOTE — PROGRESS NOTES
[facility]/  Rheumatology  [facility] [facility], [address]  [place] [address]  Phone: [phone]  Fax: [phone]    Rheumatology Progress Note 10/04/2024      Sue Schafer is a 54 y.o. female here for   Chief Complaint   Patient presents with    Follow-up       Last Visit: 5/30/24    Rheum Hx      Referred by self for knee pain.  Chief complaint: Swollen hands, feet, legs, stiffness  Since February 2019  Slow onset  Intermittent remitting course  Precipitating factors: Fallen eyes  No associated symptoms  Better: Lately nothing  Worse: Walking sitting  Previous treatments:  Naproxen-somewhat helpful  Ibuprofen-somewhat helpful  Acetaminophen-somewhat helpful  Steroids-very helpful- Medrol, no side effects  Occupation: Medical assistant  Hx of fall on ice/knee trauma x21st in 2/2017, then 2/2019  Sometimes locks ups, hand locking at times  Bilateral shoulder, right hip, bilateral knees, bilateral ankle  pain  Swelling of right 5th digit and right leg  Morning stiffness until after hot shower  Hx of sciatica with pregnancy  Review of systems positive for:  Hair loss  MARIE sometimes; tightness more than SOB; last wk R  back/flank discomfort' Dx asthma in past- on inh prn with help  Lower extremity swelling by the end of the day  Sensitivity to the sun- itchy, red, swollen spots  Joint pain swelling stiffness  Weakness hands  Numbness tingling legs  Allergies, seasonal- Zyrtec helps  FHx: cousins x3- SLE  psoriasis- sister  rosacea- mohter   1/4/2022   C-ANCA (PR3) BY EIA <0.2…    C-ANCA FLOURESCENCE Negative…    P-ANCA (MPO) BY EIA <0.2…    P-ANCA FLOURESCENCE Negative…    KRISTAL Positive…    KRISTAL Titer 1:320…    KRISTAL Pattern Nucleolar…    SSA ANTIBODIES <0.2…    SSB ANTIBODIES <0.2…    Angiotensin 1 Conv 30…    Thyroid Peroxidase (TPO) Antibody <1.0…    Citrulline Antibody, IgG 31… (H)    Centromere Ab Negative…    BULMARO-1 ANTIBODY <0.2…    RNP Antibody <0.2…         Previous Tx  Previous  treatments:  Naproxen-somewhat helpful  Ibuprofen-somewhat helpful  Acetaminophen-somewhat helpful  Steroids-very helpful- Medrol, no side effects  Toradol- very helpful  Otezla- currently on  Orencia- currently on    Health Maintenance  DXA- none  Malignancy Hx- none  Component      Latest Ref Rng 10/27/2021   TB Result Negative…    Hepatitis B Surface AB POSITIVE…      Component      Latest Ref Rng 1/4/2022   HIV 1/2 Antigen/Antibody Screen with Reflex to Confirmation Non Reactive…    Hiv Interpretation Negative…    Hepatitis B Surface AG      NEG  NEGATIVE      Immunization History   Administered Date(s) Administered    Influenza, trivalent, adjuvanted 04/01/2022, 10/28/2022    Annmarie SARS-CoV-2 Vaccination 04/01/2022          Past Medical History:   Diagnosis Date    ADHD (attention deficit hyperactivity disorder)     Allergic     Arthritis     Asthma     Chronic pain disorder     Dermatitis     GERD (gastroesophageal reflux disease)     Lower extremity edema     Plaque psoriasis     Positive KRISTAL (antinuclear antibody)     Seropositive rheumatoid arthritis of multiple sites (Multi)       Past Surgical History:   Procedure Laterality Date    BLADDER SUSPENSION      BONE BIOPSY  1996    JAW    BREAST BIOPSY Bilateral     needle biopsies in separate times    HYSTERECTOMY  01/21/2014    Hysterectomy    WISDOM TOOTH EXTRACTION  1996      Current Outpatient Medications   Medication Sig Dispense Refill    abatacept (Orencia ClickJect) 125 mg/mL auto-injector auto-injection Inject 1 mL (125 mg) under the skin 2 times a week. 8 mL 11    albuterol (Ventolin HFA) 90 mcg/actuation inhaler PRN 18 g 3    amphetamine-dextroamphetamine XR (Adderall XR) 10 mg 24 hr capsule Take 2 capsules (20 mg) by mouth once daily in the morning. Do not crush or chew.      apremilast (Otezla) 30 mg tablet TAKE ONE (1) TABLET BY MOUTH TWICE DAILY 60 tablet 11    cetirizine (ZyrTEC) 10 mg capsule 2 times a day.      cyclobenzaprine (Flexeril)  10 mg tablet Take 1 tablet (10 mg) by mouth. PRN      ergocalciferol (Vitamin D-2) 1.25 MG (13449 UT) capsule Take 1 capsule (50,000 Units) by mouth 2 times a week. 1 capsule po weekly for 90 days 24 capsule 3    fluconazole (Diflucan) 150 mg tablet Monday, Wednesday, Friday as needed for yeast infection. 12 tablet 1    folic acid (Folvite) 1 mg tablet 1 tablet Orally Once a day for 90 days      furosemide (Lasix) 20 mg tablet 3 times a day as needed.      hydroxychloroquine (PlaqueniL) 200 mg tablet 1 tablet Orally twice daily for 90 days 180 tablet 3    hydrOXYzine HCL (Atarax) 25 mg tablet Take by mouth if needed for itching.      lidocaine (Lidoderm) 5 % patch if needed.      nystatin (Mycostatin) 100,000 unit/gram powder Apply 1 Application topically 2 times a day. 60 g 1    potassium chloride ER (Micro-K) 10 mEq ER capsule 1 capsule (10 mEq). Takes with water pill      omeprazole (PriLOSEC) 20 mg DR capsule Take 1 capsule (20 mg) by mouth once daily. Do not crush or chew. 30 capsule 11     Current Facility-Administered Medications   Medication Dose Route Frequency Provider Last Rate Last Admin    BUPivacaine HCl (Marcaine) 0.5 % (5 mg/mL) injection 10 mg  2 mL injection Once Kody Patel MD        BUPivacaine HCl (Marcaine) 0.5 % (5 mg/mL) injection 15 mg  3 mL injection Once Kody Patel MD        methylPREDNISolone acetate (DEPO-Medrol) injection 30 mg  30 mg intramuscular Once Kody Patel MD        methylPREDNISolone acetate (DEPO-Medrol) injection 40 mg  40 mg intramuscular Once Kody Patel MD        methylPREDNISolone acetate (DEPO-Medrol) injection 80 mg  80 mg intramuscular Once Allison Valentine MD          Allergies   Allergen Reactions    Other Rash     Rash from Adhesive from Band-Aid      Visit Vitals  /74 (BP Location: Left arm, Patient Position: Sitting)   Pulse 62   Wt 110 kg (242 lb)   SpO2 100%   BMI 40.27 kg/m²   OB Status Hysterectomy   Smoking Status Never   BSA 2.25 m²             Rapid 3  Function Score (FN): 2.7  Pain Score (PN) (0-10): 6  Patient Global (PTGL) (0-10): 5  Rapid3 Score: 13.7  RAPID3 Weighted Score: 4.57     Workup  Component      Latest Ref Rn 9/13/2024   WBC      4.4 - 11.3 x10*3/uL 12.3 (H)    nRBC      0.0 - 0.0 /100 WBCs 0.0    RBC      4.00 - 5.20 x10*6/uL 4.71    HEMOGLOBIN      12.0 - 16.0 g/dL 13.6    HEMATOCRIT      36.0 - 46.0 % 43.3    MCV      80 - 100 fL 92    MCH      26.0 - 34.0 pg 28.9    MCHC      32.0 - 36.0 g/dL 31.4 (L)    RED CELL DISTRIBUTION WIDTH      11.5 - 14.5 % 13.1    Platelets      150 - 450 x10*3/uL 327    Neutrophils %      40.0 - 80.0 % 59.5    Immature Granulocytes %, Automated      0.0 - 0.9 % 0.5    Lymphocytes %      13.0 - 44.0 % 32.2    Monocytes %      2.0 - 10.0 % 6.2    Eosinophils %      0.0 - 6.0 % 1.0    Basophils %      0.0 - 2.0 % 0.6    Neutrophils Absolute      1.20 - 7.70 x10*3/uL 7.34    Immature Granulocytes Absolute, Automated      0.00 - 0.70 x10*3/uL 0.06    Lymphocytes Absolute      1.20 - 4.80 x10*3/uL 3.97    Monocytes Absolute      0.10 - 1.00 x10*3/uL 0.77    Eosinophils Absolute      0.00 - 0.70 x10*3/uL 0.12    Basophils Absolute      0.00 - 0.10 x10*3/uL 0.07    GLUCOSE      74 - 99 mg/dL 144 (H)    SODIUM      136 - 145 mmol/L 141    POTASSIUM      3.5 - 5.3 mmol/L 4.1    CHLORIDE      98 - 107 mmol/L 103    Bicarbonate      21 - 32 mmol/L 30    Anion Gap      10 - 20 mmol/L 12    Blood Urea Nitrogen      6 - 23 mg/dL 21    Creatinine      0.50 - 1.05 mg/dL 0.84    EGFR      >60 mL/min/1.73m*2 83    Calcium      8.6 - 10.6 mg/dL 9.3    Albumin      3.4 - 5.0 g/dL 4.0    Alkaline Phosphatase      33 - 110 U/L 97    Total Protein      6.4 - 8.2 g/dL 6.9    AST      9 - 39 U/L 14    Bilirubin Total      0.0 - 1.2 mg/dL 0.4    ALT      7 - 45 U/L 17    IRON      35 - 150 ug/dL 101    UIBC      110 - 370 ug/dL 252    TIBC      240 - 445 ug/dL 353    % Saturation      25 - 45 % 29    C1Q Complement       10.3 - 20.5 mg/dL 14.1    C3 Complement      87 - 200 mg/dL 152    C4 Complement      10 - 50 mg/dL 22    Anti-DNA (DS)      <5.0 IU/mL <1.0    C-Reactive Protein      <1.00 mg/dL 0.70    Creatine Kinase      0 - 215 U/L 49    FERRITIN      8 - 150 ng/mL 97    Vitamin B12      211 - 911 pg/mL 397    Scan Result Vectra 43 (low)   Sed Rate      0 - 30 mm/h 19    Vitamin D, 25-Hydroxy, Total      30 - 100 ng/mL 23 (L)             Assessment/Plan  No diagnosis found.       No orders of the defined types were placed in this encounter.         Since last appt, adherent and tolerating Orencia 125 mg weekly,  mg daily, and Otezla 30 mg daily.  Fingers DIPs, elbows, toes with L 2nd toe deformities  AM stiffness at least 60 min.   Difficulty raising arms  Ibuprofen and Tylenol  Second biopsy from leg showed porokeratosis  Lac Hydrin did not work  Denies any recent or current infection.  Not on any glucocorticoids.  ROS+ for fatigue, blurry vision, swollen glands/nodes, MARIE, sun sens, joint pain/swelling/stiffness, numbness/tingling,   Advised on exercise, recommend quad strengthening.  Rapid 3 consistent with high severity.  Labs reviewed  D/w pt tx options   Switch to Orencia infusion due to lack of full efficacy with current regimen.  dizziness spells positional. ?vertigo, post recurrent ear infections and on immunosuppression- recommend ENT eval- ordered.  Podiatry eval  XR knees  Continue Otezla which has been helpful.  Continue HCQ  DXA  Advised of possible side effects and importance of monitoring.   All questions answered.  Patient to follow up with primary care provider regarding all other medical issues not addressed today and for medical chart updating.     Allison Valentine MD      Patient Care Team:  Luis Carlos Mason DO as PCP - General (Family Medicine)  Luis Carlos Mason DO as PCP - Employee [employer] PCP  Allison Valentine MD as Consulting Physician (Rheumatology)

## 2024-10-09 ENCOUNTER — E-VISIT (OUTPATIENT)
Dept: PRIMARY CARE | Facility: CLINIC | Age: 54
End: 2024-10-09
Payer: COMMERCIAL

## 2024-10-09 DIAGNOSIS — L03.115 CELLULITIS OF RIGHT LOWER EXTREMITY: Primary | ICD-10-CM

## 2024-10-09 RX ORDER — CEPHALEXIN 500 MG/1
500 CAPSULE ORAL 2 TIMES DAILY
Qty: 20 CAPSULE | Refills: 0 | Status: SHIPPED | OUTPATIENT
Start: 2024-10-09 | End: 2024-10-19

## 2024-10-10 ENCOUNTER — TREATMENT (OUTPATIENT)
Dept: PHYSICAL THERAPY | Facility: CLINIC | Age: 54
End: 2024-10-10
Payer: COMMERCIAL

## 2024-10-10 DIAGNOSIS — M77.11 LATERAL EPICONDYLITIS, RIGHT ELBOW: ICD-10-CM

## 2024-10-10 PROCEDURE — 97110 THERAPEUTIC EXERCISES: CPT | Mod: GP,CQ

## 2024-10-10 ASSESSMENT — PAIN SCALES - GENERAL: PAINLEVEL_OUTOF10: 7

## 2024-10-10 ASSESSMENT — PAIN - FUNCTIONAL ASSESSMENT: PAIN_FUNCTIONAL_ASSESSMENT: 0-10

## 2024-10-10 NOTE — PROGRESS NOTES
"Physical Therapy Treatment    Patient Name: Sue Schafer  MRN: [id number]  Today's Date: 10/10/2024  Time Calculation  Start Time: 0745  Stop Time: 0828  Time Calculation (min): 43 min  PT Therapeutic Procedures Time Entry  Therapeutic Exercise Time Entry: 39    Insurance:  Visit number: 2 of 8  Authorization info: 11-20 visits completed   Insurance Type: Payor:  EMPLOYEE MEDICAL PLAN / Plan:  EMPLOYEE MEDICAL PLAN [employer] / Product Type: *No Product type* /     Current Problem   1. Lateral epicondylitis, right elbow  Follow Up In Physical Therapy          Subjective   General    Patient reports no changes since last session, still c/o a lot of pain and swelling which worsens throughout the day. States \"its a 15/10 pain when I  something\". Reports she uses ice and heat, still using L hand at work and during ADLs. Having trouble dressing and doing her hair. Still getting radicular s/s in last 2 digits.     Precautions:   None     Pain   Pain Assessment: 0-10  0-10 (Numeric) Pain Score: 7    Post Treatment Pain Level   No change    Objective   Pain and swelling to R wrist extensors from origin to muscle belly     Observed: limited ROM wrist flex and elbow supination    Treatments:  Therapeutic Exercise:  Therapeutic Exercise  Therapeutic Exercise Performed: Yes  Therapeutic Exercise Activity 1: towel   Therapeutic Exercise Activity 2: wrist flex/ext AROM x25  Therapeutic Exercise Activity 3: AROM forearm pronation/supination x25  Therapeutic Exercise Activity 4: elbow flex/ext x25  Therapeutic Exercise Activity 5: light resisted finger ext (with reaching)  Therapeutic Exercise Activity 6: ISO gripping x10  Therapeutic Exercise Activity 7: K tape to wrist extensors    Compression wrap at elbow      Assessment   Assessment:    Patient tolerated session better today with less pain, good participation in therex with no pain with ISO gripping and light finger resistance. Reports less pain with " compression wrap. Education for compression wrap and k tape management.     Plan:    Consult with hand therapist    OP EDUCATION:   Movement within pain free ROM, updated HEP    Goals:   Active       PT Problem       PT Goal 1       Start:  09/26/24    Expected End:  10/31/24       Pt will be 50% IND with HEP in 4 weeks in order to progress with therapy.          PT Goal 2       Start:  09/26/24    Expected End:  10/31/24       Pt will demonstrated improve AROM of R elbow as follows: 0-140 in 4 weeks to improve mobility required for dressing, bathing, cooking and cleaning.          PT Goal 3       Start:  09/26/24    Expected End:  10/31/24       Pt will improve R elbow strength to 4/5 in 4 weeks in order to improve strength required to lift objects at home including groceries and to improve cleaning tasks.           PT Goal 4       Start:  09/26/24    Expected End:  10/31/24       Pt will reduce pain levels to no more than 3/10 in 4 weeks in order to improve work related tasks.              PT Problem       PT Goal 1       Start:  09/26/24    Expected End:  11/30/24       Pt will be 100% IND with HEP in 8 weeks in order to maintain progress with therapy.           PT Goal 2       Start:  09/26/24    Expected End:  11/30/24       Pt will reduce pain levels to no more than 0/10 in 8 weeks in order to improve work related tasks.          PT Goal 3       Start:  09/26/24    Expected End:  11/30/24       Pt will improve  strength to at least #50 in 8 weeks required for work tasks, cooking/cleaning and self care.         PT Goal 4       Start:  09/26/24    Expected End:  11/30/24       Pt will demonstrate subjective improvement of ADLs and recreational activities through improved score of 70 on UEFS in 8 weeks for work tasks.

## 2024-10-15 ENCOUNTER — TREATMENT (OUTPATIENT)
Dept: PHYSICAL THERAPY | Facility: CLINIC | Age: 54
End: 2024-10-15
Payer: COMMERCIAL

## 2024-10-15 ENCOUNTER — DOCUMENTATION (OUTPATIENT)
Dept: PHYSICAL THERAPY | Facility: CLINIC | Age: 54
End: 2024-10-15
Payer: COMMERCIAL

## 2024-10-15 DIAGNOSIS — M77.11 LATERAL EPICONDYLITIS, RIGHT ELBOW: ICD-10-CM

## 2024-10-15 NOTE — PROGRESS NOTES
"Physical Therapy                 Therapy Communication Note    Patient Name: Sue Schafer  MRN: [id number]  Department:   Room: Room/bed info not found  Today's Date: 10/15/2024     Discipline: Physical Therapy    Missed Visit Reason:  Patient still c/o no changes with physical therapy with \"crazy pain and swelling to entire right arm.\" Reports she does not believe anything is working. Per evaluating PT, will follow up with referring physician at this time.     Missed Time: Cancel    Comment:  "

## 2024-10-18 ENCOUNTER — TELEPHONE (OUTPATIENT)
Dept: PRIMARY CARE | Facility: CLINIC | Age: 54
End: 2024-10-18
Payer: COMMERCIAL

## 2024-10-18 DIAGNOSIS — F90.0 ATTENTION DEFICIT HYPERACTIVITY DISORDER, INATTENTIVE TYPE, MILD: Primary | ICD-10-CM

## 2024-10-18 RX ORDER — DEXTROAMPHETAMINE SACCHARATE, AMPHETAMINE ASPARTATE MONOHYDRATE, DEXTROAMPHETAMINE SULFATE AND AMPHETAMINE SULFATE 5; 5; 5; 5 MG/1; MG/1; MG/1; MG/1
20 CAPSULE, EXTENDED RELEASE ORAL EVERY MORNING
Qty: 30 CAPSULE | Refills: 0 | Status: SHIPPED | OUTPATIENT
Start: 2024-10-18 | End: 2024-11-17

## 2024-10-18 NOTE — TELEPHONE ENCOUNTER
Patient communicated that she is doing well on current dose to which we will continue for the next month to monitor ongoing effectiveness

## 2024-10-22 DIAGNOSIS — M05.79 SEROPOSITIVE RHEUMATOID ARTHRITIS OF MULTIPLE SITES (MULTI): ICD-10-CM

## 2024-10-22 PROCEDURE — RXMED WILLOW AMBULATORY MEDICATION CHARGE

## 2024-10-22 RX ORDER — ABATACEPT 125 MG/ML
125 INJECTION, SOLUTION SUBCUTANEOUS
Qty: 4 ML | Refills: 11 | Status: CANCELLED | OUTPATIENT
Start: 2024-10-22

## 2024-10-23 ENCOUNTER — SPECIALTY PHARMACY (OUTPATIENT)
Dept: PHARMACY | Facility: CLINIC | Age: 54
End: 2024-10-23

## 2024-10-23 ENCOUNTER — APPOINTMENT (OUTPATIENT)
Dept: PHYSICAL THERAPY | Facility: CLINIC | Age: 54
End: 2024-10-23
Payer: COMMERCIAL

## 2024-10-24 ENCOUNTER — PHARMACY VISIT (OUTPATIENT)
Dept: PHARMACY | Facility: CLINIC | Age: 54
End: 2024-10-24
Payer: COMMERCIAL

## 2024-10-25 ENCOUNTER — TELEPHONE (OUTPATIENT)
Dept: RHEUMATOLOGY | Facility: CLINIC | Age: 54
End: 2024-10-25
Payer: COMMERCIAL

## 2024-10-25 DIAGNOSIS — M05.79 SEROPOSITIVE RHEUMATOID ARTHRITIS OF MULTIPLE SITES (MULTI): Primary | ICD-10-CM

## 2024-10-25 DIAGNOSIS — M05.79 SEROPOSITIVE RHEUMATOID ARTHRITIS OF MULTIPLE SITES (MULTI): ICD-10-CM

## 2024-10-25 RX ORDER — ABATACEPT 125 MG/ML
125 INJECTION, SOLUTION SUBCUTANEOUS
Qty: 4 ML | Refills: 11 | Status: SHIPPED | OUTPATIENT
Start: 2024-10-27 | End: 2025-10-27

## 2024-10-25 NOTE — TELEPHONE ENCOUNTER
Pt is requesting an order for XR of right ankle and foot due to pain.  Please advise.     Marge Mccray MA

## 2024-10-26 ENCOUNTER — HOSPITAL ENCOUNTER (OUTPATIENT)
Dept: RADIOLOGY | Facility: CLINIC | Age: 54
Discharge: HOME | End: 2024-10-26
Payer: COMMERCIAL

## 2024-10-26 DIAGNOSIS — Z78.0 POSTMENOPAUSAL: ICD-10-CM

## 2024-10-26 DIAGNOSIS — M05.79 SEROPOSITIVE RHEUMATOID ARTHRITIS OF MULTIPLE SITES (MULTI): ICD-10-CM

## 2024-10-26 PROCEDURE — 77085 DXA BONE DENSITY AXL VRT FX: CPT

## 2024-10-26 PROCEDURE — 73610 X-RAY EXAM OF ANKLE: CPT | Mod: RIGHT SIDE | Performed by: RADIOLOGY

## 2024-10-26 PROCEDURE — 73630 X-RAY EXAM OF FOOT: CPT | Mod: RIGHT SIDE | Performed by: RADIOLOGY

## 2024-10-26 PROCEDURE — 73630 X-RAY EXAM OF FOOT: CPT | Mod: RT

## 2024-10-26 PROCEDURE — 73610 X-RAY EXAM OF ANKLE: CPT | Mod: RT

## 2024-10-28 DIAGNOSIS — M05.79 SEROPOSITIVE RHEUMATOID ARTHRITIS OF MULTIPLE SITES (MULTI): Primary | ICD-10-CM

## 2024-10-28 RX ORDER — ALBUTEROL SULFATE 0.83 MG/ML
3 SOLUTION RESPIRATORY (INHALATION) AS NEEDED
OUTPATIENT
Start: 2024-10-30

## 2024-10-28 RX ORDER — EPINEPHRINE 0.3 MG/.3ML
0.3 INJECTION SUBCUTANEOUS EVERY 5 MIN PRN
OUTPATIENT
Start: 2024-10-30

## 2024-10-28 RX ORDER — DIPHENHYDRAMINE HYDROCHLORIDE 50 MG/ML
50 INJECTION INTRAMUSCULAR; INTRAVENOUS AS NEEDED
OUTPATIENT
Start: 2024-10-30

## 2024-10-28 RX ORDER — FAMOTIDINE 10 MG/ML
20 INJECTION INTRAVENOUS ONCE AS NEEDED
OUTPATIENT
Start: 2024-10-30

## 2024-10-29 PROCEDURE — RXMED WILLOW AMBULATORY MEDICATION CHARGE

## 2024-10-30 ENCOUNTER — APPOINTMENT (OUTPATIENT)
Dept: PHYSICAL THERAPY | Facility: CLINIC | Age: 54
End: 2024-10-30
Payer: COMMERCIAL

## 2024-10-31 ENCOUNTER — SPECIALTY PHARMACY (OUTPATIENT)
Dept: PHARMACY | Facility: CLINIC | Age: 54
End: 2024-10-31

## 2024-11-04 ENCOUNTER — APPOINTMENT (OUTPATIENT)
Dept: PHYSICAL THERAPY | Facility: CLINIC | Age: 54
End: 2024-11-04
Payer: COMMERCIAL

## 2024-11-06 ENCOUNTER — SPECIALTY PHARMACY (OUTPATIENT)
Dept: PHARMACY | Facility: CLINIC | Age: 54
End: 2024-11-06

## 2024-11-06 ENCOUNTER — APPOINTMENT (OUTPATIENT)
Dept: PHYSICAL THERAPY | Facility: CLINIC | Age: 54
End: 2024-11-06
Payer: COMMERCIAL

## 2024-11-07 ENCOUNTER — APPOINTMENT (OUTPATIENT)
Dept: RHEUMATOLOGY | Facility: CLINIC | Age: 54
End: 2024-11-07
Payer: COMMERCIAL

## 2024-11-08 ENCOUNTER — OFFICE VISIT (OUTPATIENT)
Dept: RHEUMATOLOGY | Facility: CLINIC | Age: 54
End: 2024-11-08
Payer: COMMERCIAL

## 2024-11-08 DIAGNOSIS — Z23 NEED FOR IMMUNIZATION AGAINST INFLUENZA: ICD-10-CM

## 2024-11-08 PROCEDURE — 90471 IMMUNIZATION ADMIN: CPT | Performed by: INTERNAL MEDICINE

## 2024-11-11 ENCOUNTER — PHARMACY VISIT (OUTPATIENT)
Dept: PHARMACY | Facility: CLINIC | Age: 54
End: 2024-11-11
Payer: COMMERCIAL

## 2024-11-11 ENCOUNTER — APPOINTMENT (OUTPATIENT)
Dept: PHYSICAL THERAPY | Facility: CLINIC | Age: 54
End: 2024-11-11
Payer: COMMERCIAL

## 2024-11-12 ENCOUNTER — EVALUATION (OUTPATIENT)
Dept: OCCUPATIONAL THERAPY | Facility: CLINIC | Age: 54
End: 2024-11-12
Payer: COMMERCIAL

## 2024-11-12 DIAGNOSIS — M77.11 LATERAL EPICONDYLITIS, RIGHT ELBOW: Primary | ICD-10-CM

## 2024-11-12 PROCEDURE — 97165 OT EVAL LOW COMPLEX 30 MIN: CPT | Mod: GO | Performed by: OCCUPATIONAL THERAPIST

## 2024-11-12 ASSESSMENT — PAIN DESCRIPTION - DESCRIPTORS: DESCRIPTORS: ACHING;RADIATING

## 2024-11-12 ASSESSMENT — PAIN - FUNCTIONAL ASSESSMENT: PAIN_FUNCTIONAL_ASSESSMENT: 0-10

## 2024-11-12 ASSESSMENT — PAIN SCALES - GENERAL: PAINLEVEL_OUTOF10: 6

## 2024-11-12 NOTE — PROGRESS NOTES
Occupational Therapy    Evaluation    Patient Name: Sue Schafer  MRN: [id number]  OT Received on: 11/12/2024 OT Received On: 11/12/24  Time Calculation  Start Time: 1033  Stop Time: 1115  Time Calculation (min): 42 min  OT Evaluation Time Entry  OT Evaluation (Low) Time Entry: 30    Visit # 1 of 12  Visits/Dates Authorized: requested auth    Current Problem:   Problem List Items Addressed This Visit             ICD-10-CM    Lateral epicondylitis, right elbow - Primary M77.11    Relevant Orders    Follow Up In Occupational Therapy     Insurance Type: Payor:  EMPLOYEE MEDICAL PLAN / Plan:  EMPLOYEE MEDICAL PLAN CONSUMER [employer] / Product Type: *No Product type* /    Assessment:     OT Assessment Results: Decreased ADL status, Decreased upper extremity range of motion, Decreased upper extremity strength  Strengths: Ability to acquire knowledge, Coping skills, Insight into problems  Plan:  Treatment Interventions: UE strengthening/ROM, Neuromuscular reeducation, UE splinting  Treatment Interventions: UE strengthening/ROM, Neuromuscular reeducation, UE splinting  OT Plan: 2 x week for 6 weeks  12 visits  Frequency: 2 x week for 6 weeks  Duration: 6 weeks  Onset Date: 10/12/24  Certification Period Start Date: 11/12/24  Certification Period End Date: 02/10/25  Number of Treatments Authorized: MN  Rehab Potential: Good  Plan of Care Agreement: Patient    Subjective   Current Problem:  1. Lateral epicondylitis, right elbow  Referral to Occupational Therapy    Follow Up In Occupational Therapy        General:   OT Received On: 11/12/24  General  Reason for Referral: ADL impairment right elbow and hand  Referred By: Dr. Robin  Past Medical History Relevant to Rehab: pmhx of RA, previous phys therapy for LET, and current repair of right lateral elbow DOS 10/28/24  Preferred Learning Style: verbal, visual, written  Precautions: NWB right wrist and forearm  UE Weight Bearing Status: Right Non-Weight  Bearing  Pain:  Pain Assessment  Pain Assessment: 0-10  0-10 (Numeric) Pain Score: 6  Pain Location: Elbow  Pain Orientation: Right  Pain Radiating Towards: lateral elbow and forearm proximal  Pain Descriptors: Aching, Radiating  Pain Frequency: Constant/continuous  Patient's Stated Pain Goal: No pain    Objective   Cognition:  Overall Cognitive Status: Within Functional Limits           Home Living:  Type of Home: House  Lives With: Spouse  Prior Function:  Level of [place]: Independent with ADLs and functional transfers, Independent with homemaking with ambulation  Hand Dominance: Right  IADL History:  Occupation: Full time employment  Type of Occupation: office work; medical assist; currently lighter duty, rooming patients, some vitals, works in Rheumatology, and floats  Leisure and Hobbies: enoys riding motorcycle  ADL:  ADL Comments: UEFI completed see for details  Modalities: Recommending warm up prior to ROM exercises     Splinting: Pt has wrist splint prefab     Therapy/Activity:   Issued instructions and handouts for home program of:  AROM R elbow ext/flex; wrist ext/flex 2 x 10 reps pron/supination; icing prior to HS, and use of warm up if needed for wrist, PROM gradually progressing with combination movement.        Sensation:  Light Touch: No apparent deficits  Sensation Comment: intact to light touch  Strength:  and pinch to follow     Extremities: RUE   RUE : Exceptions to WFL  RUE AROM (degrees)  R Shoulder Flexion  0-170: 125 Degrees  R Elbow Flexion/Extension 0-135-150: 100 ° (-25 extension)  R Forearm Pronation  0-80-90: 90 Degrees  R Forearm Supination  0-80-90: 70 Degrees  R Wrist Flexion 0-80: 65 Degrees  R Wrist Extension 0-70: 70 Degrees and LUE   LUE: Within Functional Limits      Outcome Measures: OT Adult Other Outcome Measures  Other Outcome Measures: UEFI 16/80 score UEFI 16/80    OP EDUCATION:  Education  Individual(s) Educated: Patient  Education Provided: Diagnosis &  Precautions, Symptom management, POC discussed and agreed upon, Wound care, Risk and benefits of OT discussed with patient or other  Home Program: AROM, Activity modification, Modalities, Orthotic wearing schedule, care and precautions, Wound/scar care, Handout issued  Diagnosis and Precautions: pain and weakness right  and wrist weakness  Risk and Benefits Discussed with Patient/Caregiver/Other: yes  Patient/Caregiver Demonstrated Understanding: yes  Plan of Care Discussed and Agreed Upon: yes  Patient Response to Education: Patient/Caregiver Verbalized Understanding of Information, Patient/Caregiver Performed Return Demonstration of Exercises/Activities, Patient/Caregiver Asked Appropriate Questions    Goals:  Active       OT Problem       Patient will demo pain free  of 45 lbs with extended elbow for right UE bathing grooming and carrying groceries.       Start:  11/12/24    Expected End:  02/10/25            Pt will demo full right elbow ROM 0/135 degrees, wrist and forearm with good strength 5/5 MMT for carrying laundry and riding cycle.       Start:  11/12/24    Expected End:  02/10/25            PATIENT WILL SHOW A SIGNIFICANT CHANGE IN UEFI PATIENT REPORTED OUTCOME TOOL TO DEMONSTRATE SUBJECTIVE IMPROVEMENT       Start:  11/12/24    Expected End:  02/10/25            PATIENT WILL DEMONSTRATE INDEPENDENCE IN HOME PROGRAM FOR SUPPORT OF PROGRESSION (Progressing)       Start:  11/12/24    Expected End:  02/10/25            PATIENT WILL REPORT PAIN OF 0-2/10 DEMONSTRATING A REDUCTION OF OVERALL PAIN       Start:  11/12/24    Expected End:  02/10/25                Photo Preface (Leave Blank If You Do Not Want): Photographs were obtained today Detail Level: Zone

## 2024-11-12 NOTE — LETTER
November 12, 2024    Luis Carlos Robin MD  [address] [place] [address]  [place] [address]    Patient: Sue Schafer   YOB: 1970   Date of Visit: 11/12/2024       Dear Luis Carlos Robin MD  [address] [place] [address]  [address]  [place] [address]    The attached plan of care is being sent to you because your patient’s medical reimbursement requires that you certify the plan of care. Your signature is required to allow uninterrupted insurance coverage.      You may indicate your approval by signing below and faxing this form back to us at Dept Fax: [phone].    Please call Dept: [phone] with any questions or concerns.    Thank you for this referral,        Maida Mustafa OT  [facility] PHYSICIAN RODGER  [facility] PHYSICIAN RODGER  [address] [place] [address]    Payer: Payor:  EMPLOYEE MEDICAL PLAN / Plan:  EMPLOYEE MEDICAL PLAN CONSUMER [employer] / Product Type: *No Product type* /                                                                         Date:     Dear Maida Mustafa OT,     Re: Ms. Sue Schafer, MRN:[id number]    I certify that I have reviewed the attached plan of care and it is medically necessary for Ms. Sue Schafer (1970) who is under my care.          ______________________________________                    _________________  Provider name and credentials                                           Date and time                                                                                           Plan of Care 11/12/24   Effective from: 11/12/2024  Effective to: 2/10/2025    Plan ID: [id number]            Participants as of Finalize on 11/12/2024    Name Type Comments Contact Info    Luis Carlos Robin MD Referring Provider  [phone]    Maida Mustafa OT Occupational Therapist  [phone]       Last Plan Note     Author: Maida Mustafa OT Status: Sign when Signing Visit Last edited: 11/12/2024 10:30 AM       Occupational  Therapy    Evaluation    Patient Name: Sue Schafer  MRN: [id number]  OT Received on: 11/12/2024 OT Received On: 11/12/24  Time Calculation  Start Time: 1033  Stop Time: 1115  Time Calculation (min): 42 min  OT Evaluation Time Entry  OT Evaluation (Low) Time Entry: 30    Visit # 1 of 12  Visits/Dates Authorized: requested auth    Current Problem:   Problem List Items Addressed This Visit             ICD-10-CM    Lateral epicondylitis, right elbow - Primary M77.11    Relevant Orders    Follow Up In Occupational Therapy     Insurance Type: Payor:  EMPLOYEE MEDICAL PLAN / Plan:  EMPLOYEE MEDICAL PLAN CONSUMER SELECT / Product Type: *No Product type* /    Assessment:     OT Assessment Results: Decreased ADL status, Decreased upper extremity range of motion, Decreased upper extremity strength  Strengths: Ability to acquire knowledge, Coping skills, Insight into problems  Plan:  Treatment Interventions: UE strengthening/ROM, Neuromuscular reeducation, UE splinting  Treatment Interventions: UE strengthening/ROM, Neuromuscular reeducation, UE splinting  OT Plan: 2 x week for 6 weeks  12 visits  Frequency: 2 x week for 6 weeks  Duration: 6 weeks  Onset Date: 10/12/24  Certification Period Start Date: 11/12/24  Certification Period End Date: 02/10/25  Number of Treatments Authorized: MN  Rehab Potential: Good  Plan of Care Agreement: Patient    Subjective   Current Problem:  1. Lateral epicondylitis, right elbow  Referral to Occupational Therapy    Follow Up In Occupational Therapy        General:   OT Received On: 11/12/24  General  Reason for Referral: ADL impairment right elbow and hand  Referred By: Dr. Robin  Past Medical History Relevant to Rehab: pmhx of RA, previous phys therapy for LET, and current repair of right lateral elbow DOS 10/28/24  Preferred Learning Style: verbal, visual, written  Precautions: NWB right wrist and forearm  UE Weight Bearing Status: Right Non-Weight Bearing  Pain:  Pain  Assessment  Pain Assessment: 0-10  0-10 (Numeric) Pain Score: 6  Pain Location: Elbow  Pain Orientation: Right  Pain Radiating Towards: lateral elbow and forearm proximal  Pain Descriptors: Aching, Radiating  Pain Frequency: Constant/continuous  Patient's Stated Pain Goal: No pain    Objective   Cognition:  Overall Cognitive Status: Within Functional Limits           Home Living:  Type of Home: House  Lives With: Spouse  Prior Function:  Level of [place]: Independent with ADLs and functional transfers, Independent with homemaking with ambulation  Hand Dominance: Right  IADL History:  Occupation: Full time employment  Type of Occupation: office work; medical assist; currently lighter duty, rooming patients, some vitals, works in Rheumatology, and floats  Leisure and Hobbies: enoys riding motorcycle  ADL:  ADL Comments: UEFI completed see for details  Modalities: Recommending warm up prior to ROM exercises     Splinting: Pt has wrist splint prefab     Therapy/Activity:   Issued instructions and handouts for home program of:  AROM R elbow ext/flex; wrist ext/flex 2 x 10 reps pron/supination; icing prior to HS, and use of warm up if needed for wrist, PROM gradually progressing with combination movement.        Sensation:  Light Touch: No apparent deficits  Sensation Comment: intact to light touch  Strength:  and pinch to follow     Extremities: RUE   RUE : Exceptions to WFL  RUE AROM (degrees)  R Shoulder Flexion  0-170: 125 Degrees  R Elbow Flexion/Extension 0-135-150: 100 ° (-25 extension)  R Forearm Pronation  0-80-90: 90 Degrees  R Forearm Supination  0-80-90: 70 Degrees  R Wrist Flexion 0-80: 65 Degrees  R Wrist Extension 0-70: 70 Degrees and LUE   LUE: Within Functional Limits      Outcome Measures: OT Adult Other Outcome Measures  Other Outcome Measures: UEFI 16/80 score UEFI 16/80    OP EDUCATION:  Education  Individual(s) Educated: Patient  Education Provided: Diagnosis & Precautions, Symptom  management, POC discussed and agreed upon, Wound care, Risk and benefits of OT discussed with patient or other  Home Program: AROM, Activity modification, Modalities, Orthotic wearing schedule, care and precautions, Wound/scar care, Handout issued  Diagnosis and Precautions: pain and weakness right  and wrist weakness  Risk and Benefits Discussed with Patient/Caregiver/Other: yes  Patient/Caregiver Demonstrated Understanding: yes  Plan of Care Discussed and Agreed Upon: yes  Patient Response to Education: Patient/Caregiver Verbalized Understanding of Information, Patient/Caregiver Performed Return Demonstration of Exercises/Activities, Patient/Caregiver Asked Appropriate Questions    Goals:  Active       OT Problem       Patient will demo pain free  of 45 lbs with extended elbow for right UE bathing grooming and carrying groceries.       Start:  11/12/24    Expected End:  02/10/25            Pt will demo full right elbow ROM 0/135 degrees, wrist and forearm with good strength 5/5 MMT for carrying laundry and riding cycle.       Start:  11/12/24    Expected End:  02/10/25            PATIENT WILL SHOW A SIGNIFICANT CHANGE IN UEFI PATIENT REPORTED OUTCOME TOOL TO DEMONSTRATE SUBJECTIVE IMPROVEMENT       Start:  11/12/24    Expected End:  02/10/25            PATIENT WILL DEMONSTRATE INDEPENDENCE IN HOME PROGRAM FOR SUPPORT OF PROGRESSION (Progressing)       Start:  11/12/24    Expected End:  02/10/25            PATIENT WILL REPORT PAIN OF 0-2/10 DEMONSTRATING A REDUCTION OF OVERALL PAIN       Start:  11/12/24    Expected End:  02/10/25                      Current Participants as of 11/12/2024    Name Type Comments Contact Info    Luis Carlos Robin MD Referring Provider  [phone]    Signature pending    Maida Mustafa OT Occupational Therapist  [phone]

## 2024-11-13 ENCOUNTER — APPOINTMENT (OUTPATIENT)
Dept: PHYSICAL THERAPY | Facility: CLINIC | Age: 54
End: 2024-11-13
Payer: COMMERCIAL

## 2024-11-18 ENCOUNTER — APPOINTMENT (OUTPATIENT)
Dept: PHYSICAL THERAPY | Facility: CLINIC | Age: 54
End: 2024-11-18
Payer: COMMERCIAL

## 2024-11-19 ENCOUNTER — TELEMEDICINE CLINICAL SUPPORT (OUTPATIENT)
Dept: PHARMACY | Facility: HOSPITAL | Age: 54
End: 2024-11-19

## 2024-11-19 ENCOUNTER — TREATMENT (OUTPATIENT)
Dept: OCCUPATIONAL THERAPY | Facility: CLINIC | Age: 54
End: 2024-11-19
Payer: COMMERCIAL

## 2024-11-19 PROCEDURE — RXMED WILLOW AMBULATORY MEDICATION CHARGE

## 2024-11-19 NOTE — PROGRESS NOTES
Occupational Therapy                 Therapy Communication Note    Patient Name: Sue Schafer  MRN: [id number]  Department:   Room: Room/bed info not found  Today's Date: 11/19/2024     Discipline: Occupational Therapy    Missed Time: Cancel    Comment:Patient called in to cancel due to scheduling conflict. Did re-schedule today's appointment for later this week.

## 2024-11-19 NOTE — PROGRESS NOTES
[facility] Clinical Note    Sue Schafer is a 54 y.o. female, who is on the specialty pharmacy service for management of:  Rheumatology Core.    Sue Schafer is taking: Orencia and Otezla.    Medication Receipt Date: Orencia 11/12, set delivery for Otezla 11/22  Medication Start Date (planned or actual): continuation    Sue was contacted on 11/19/2024 at 2:50 PM for a virtual pharmacy visit with encounter number [id number] from:   [facility]  [address]  [address]  [facility] [address]  Dept: [phone]  Dept Fax: [phone]  Loc: [phone]    Sue was offered a Telemedicine Video visit or Telephone visit.  Sue consented to a telephone visit, which was performed.    The most recent encounter visit with the referring prescriber Allison Valentine MD  on 10/4/24 was reviewed.  Pharmacy will continue to collaborate in the care of this patient with the referring prescriber    General Assessment  Patient just recently restarted medications after holding medications for approximately two weeks due to surgery. Patient expresses concerns over Orencia being discontinued and having gaps in therapy. Of note, a comment was added to patients chart by pharmacy team member to request a new Rx if Orencia is discontinued in the future. Advised patient to please call pharmacy if she is ever due for her medication as has not heard from pharmacy team.     Impression/Plan  IMPRESSION/PLAN:  Is patient high risk (potential patients:  pregnancy, geriatric, pediatric)?  no  Is laboratory follow-up needed? no  Is a clinical intervention needed? no  Next reassessment date? 6 months  Additional comments:     Refer to the encounter summary report for documentation details about patient counseling and education.      Medication Adherence    The importance of adherence was discussed with the patient and they were advised to take the  medication as prescribed by their provider. Patient was encouraged to call their physician's office if they have a question regarding a missed dose.        Patient was advised to contact the pharmacy if there are any changes to their medication list, including prescriptions, OTC medications, herbal products, or supplements. Patient was advised of [facility]'s dispensing process, refill timeline, contact information ([phone]), and patient management follow up. Patient confirmed understanding of education conducted during assessment. All patient questions and concerns were addressed to the best of my ability. Patient was encouraged to contact the specialty pharmacy with any questions or concerns.    Confirmed follow-up outreaches are properly scheduled and reviewed goals of therapy with the patient.        MONIQUE PHILIP, MichaelleD

## 2024-11-21 ENCOUNTER — PHARMACY VISIT (OUTPATIENT)
Dept: PHARMACY | Facility: CLINIC | Age: 54
End: 2024-11-21
Payer: COMMERCIAL

## 2024-11-21 ENCOUNTER — DOCUMENTATION (OUTPATIENT)
Dept: PHYSICAL THERAPY | Facility: CLINIC | Age: 54
End: 2024-11-21
Payer: COMMERCIAL

## 2024-11-21 ENCOUNTER — SPECIALTY PHARMACY (OUTPATIENT)
Dept: PHARMACY | Facility: CLINIC | Age: 54
End: 2024-11-21

## 2024-11-21 NOTE — PROGRESS NOTES
Physical Therapy    Discharge Summary    Name: Sue Schafer  MRN: [id number]  : 1970  Date: 24    Discharge Summary: PT    Discharge Information: Date of discharge 24, Date of last visit 10/15/24, and Date of evaluation 24    Therapy Summary: Pt challenged with PT due to high pain, referred back to MD who referred her to OT/hand specialist. Discharge at this time.     Discharge Status: n/a      Rehab Discharge Reason: Other see above

## 2024-11-22 ENCOUNTER — TREATMENT (OUTPATIENT)
Dept: OCCUPATIONAL THERAPY | Facility: CLINIC | Age: 54
End: 2024-11-22
Payer: COMMERCIAL

## 2024-11-22 DIAGNOSIS — M77.11 LATERAL EPICONDYLITIS, RIGHT ELBOW: ICD-10-CM

## 2024-11-22 PROCEDURE — 97110 THERAPEUTIC EXERCISES: CPT | Mod: GO,CO

## 2024-11-22 ASSESSMENT — PAIN SCALES - GENERAL: PAINLEVEL_OUTOF10: 6

## 2024-11-22 ASSESSMENT — PAIN - FUNCTIONAL ASSESSMENT: PAIN_FUNCTIONAL_ASSESSMENT: 0-10

## 2024-11-22 NOTE — PROGRESS NOTES
"Occupational Therapy Treatment  Patient Name: Sue Schafer  MRN: [id number]  OT Received on: 11/22/2024 OT Received On: 11/22/24    Insurance:  Visit Number: 1 of 12  Time:  Time Calculation  Start Time: 0134  Stop Time: 0230  Time Calculation (min): 56 min  OT Therapeutic Procedures Time Entry  Therapeutic Exercise Time Entry: 56  11/19/24 OT AUTH - OT AUTH RECEIVED FOR 12 VISITS FOR [id number], [id number], [id number], [id number] FROM 11/12/24 TO 12/31/24  AUTH # [id number]  AYDEE     11/11/24 - OT - [facility] / EVAL ONLY / 30 V LIMIT YEAR OT/PT - 0 USED 11/11/24 / DED & OOP MET / ANTHEM CHAT REF #  [id number]  JS   **2 VISITS USED OF CURRENT AUTH**   Subjective   Problem List Items Addressed This Visit             ICD-10-CM       Musculoskeletal and Injuries    Lateral epicondylitis, right elbow M77.11     Referred by: Dr. Robin, next follow up 12/5/24  Patient reports \" My forearm and elbow are just so sore\".  Pt using heat and ice. Tylenol the last 2 of 7 days. Patient wearing compression sleeve at work. Goals and precautions reviewed with patient. She verbalized understanding.     Precautions: NWB  ,Patient has a tape allergy    Performing HEP?: Yes    Pain:  Pain Assessment  Pain Assessment: 0-10  0-10 (Numeric) Pain Score: 6  Pain Location: Elbow  Pain Orientation: Right  Pain Radiating Towards: lateral elbow and proximal FA  Pain Descriptors: Numbness, Sore, Sharp  Pain Frequency: Constant/continuous  Patient's Stated Pain Goal: No pain    Objective   Healing scar. Lightly scabbed. No excessive redness or drainage.   Edema: moderate throughout FA. Elbow and wrist    Sensory: impaired  Numbness/Tingling: noted today in small finger    Treatment:educated pt in scar massage and edema control tech. Verbalized understanding.   Writer provided manual retrograde massage with R arm elevated to reduce edema    Modalities:  Moist heat applied x 5 min at start of session during goal review to promote muscle movement during " session  Ice applied x 5 min at end of session during wrap up to reduce edema and inflammation.    Therapeutic Exercise:  Pt completed 1 set of 10 reps of short arc ( due to pain) elbow flexion and  ext with palm up, palm down and palm neutral.   Educated  pt in gentle isometric foam HEP x 10 reps for gross grasp and composite pinch  Educated pt in /completed HEP for standing straight arm wrist curls and wrist ext x 10 reps with 5 sec hold.  Pt completed supination/pronation x 10 reps with cues to hold at end range.   Post-tx pain:6/10, slightly increased since start of session    Assessment/Plan Pt motivated to return to function. Limited by pain and edema. Pt to follow through with use of ice and heat along with edema control strategies, elbow AROM and straight arm wrist curls as tolerated.   OP EDUCATION:  Education  Individual(s) Educated: Patient  Education Provided: Diagnosis & Precautions, Symptom management, POC discussed and agreed upon, Wound care, Risk and benefits of OT discussed with patient or other  Home Program: AROM, Activity modification, Modalities, Orthotic wearing schedule, care and precautions, Wound/scar care, Handout issued  Diagnosis and Precautions: pain and weakness right  and wrist weakness  Equipment: Cylindical foam  Risk and Benefits Discussed with Patient/Caregiver/Other: yes  Patient/Caregiver Demonstrated Understanding: yes  Plan of Care Discussed and Agreed Upon: yes  Patient Response to Education: Patient/Caregiver Verbalized Understanding of Information, Patient/Caregiver Performed Return Demonstration of Exercises/Activities, Patient/Caregiver Asked Appropriate Questions    Goals:  Active       OT Problem       Patient will demo pain free  of 45 lbs with extended elbow for right UE bathing grooming and carrying groceries. (Progressing)       Start:  11/12/24    Expected End:  02/10/25            Pt will demo full right elbow ROM 0/135 degrees, wrist and forearm with good  strength 5/5 MMT for carrying laundry and riding cycle. (Progressing)       Start:  11/12/24    Expected End:  02/10/25            PATIENT WILL SHOW A SIGNIFICANT CHANGE IN UEFI PATIENT REPORTED OUTCOME TOOL TO DEMONSTRATE SUBJECTIVE IMPROVEMENT (Progressing)       Start:  11/12/24    Expected End:  02/10/25            PATIENT WILL DEMONSTRATE INDEPENDENCE IN HOME PROGRAM FOR SUPPORT OF PROGRESSION (Progressing)       Start:  11/12/24    Expected End:  02/10/25            PATIENT WILL REPORT PAIN OF 0-2/10 DEMONSTRATING A REDUCTION OF OVERALL PAIN (Progressing)       Start:  11/12/24    Expected End:  02/10/25

## 2024-11-26 ENCOUNTER — TREATMENT (OUTPATIENT)
Dept: OCCUPATIONAL THERAPY | Facility: CLINIC | Age: 54
End: 2024-11-26
Payer: COMMERCIAL

## 2024-11-26 NOTE — PROGRESS NOTES
Occupational Therapy                 Therapy Communication Note    Patient Name: Sue Schafer  MRN: [id number]  Department:   Room: Room/bed info not found  Today's Date: 11/26/2024     Discipline: Occupational Therapy    Missed Visit Reason:  Patient cancelled due to scheduling conflict    Missed Time: Cancel

## 2024-11-29 ENCOUNTER — TREATMENT (OUTPATIENT)
Dept: OCCUPATIONAL THERAPY | Facility: CLINIC | Age: 54
End: 2024-11-29
Payer: COMMERCIAL

## 2024-11-29 DIAGNOSIS — M77.11 LATERAL EPICONDYLITIS, RIGHT ELBOW: ICD-10-CM

## 2024-11-29 PROCEDURE — 97110 THERAPEUTIC EXERCISES: CPT | Mod: GO | Performed by: OCCUPATIONAL THERAPIST

## 2024-11-29 PROCEDURE — 97140 MANUAL THERAPY 1/> REGIONS: CPT | Mod: GO | Performed by: OCCUPATIONAL THERAPIST

## 2024-11-29 PROCEDURE — 97530 THERAPEUTIC ACTIVITIES: CPT | Mod: GO | Performed by: OCCUPATIONAL THERAPIST

## 2024-11-29 ASSESSMENT — PAIN SCALES - GENERAL: PAINLEVEL_OUTOF10: 6

## 2024-11-29 ASSESSMENT — PAIN - FUNCTIONAL ASSESSMENT: PAIN_FUNCTIONAL_ASSESSMENT: 0-10

## 2024-11-29 ASSESSMENT — PAIN DESCRIPTION - DESCRIPTORS: DESCRIPTORS: SORE

## 2024-11-29 NOTE — PROGRESS NOTES
"Occupational Therapy Treatment  Patient Name: Sue Schafer  MRN: [id number]  OT Received on: 11/29/2024      Insurance:  Visit Number:  2 of 12  Time:  Time Calculation  Start Time: 1145  Stop Time: 1231  Time Calculation (min): 46 min  OT Therapeutic Procedures Time Entry  Manual Therapy Time Entry: 10  Therapeutic Activity Time Entry: 20  Therapeutic Exercise Time Entry: 16  11/19/24 OT AUTH - OT AUTH RECEIVED FOR 12 VISITS FOR [id number], 74317, 10937, 97548 FROM 11/12/24 TO 12/31/24 11/11/24 - OT - [facility]H/[facility] / EVAL ONLY / 30 V LIMIT YEAR OT/PT - 0 USED 11/11/24 / DED & OOP MET / ANTHEM CHAT REF #  [id number]  JS   **2 VISITS USED OF CURRENT AUTH**   Subjective   Problem List Items Addressed This Visit             ICD-10-CM    Lateral epicondylitis, right elbow M77.11     Referred by: Dr. Robin, next follow up 12/5/24  Patient reports \" Pt reports arm not getting as numb at the fingers, comes and goes\".  Pt using heat and ice. Tylenol prn Patient wearing compression sleeve at work. Goals and precautions reviewed with patient. She verbalized understanding.     Precautions: NWB  ,Patient has a tape allergy    Performing HEP?: Yes    Pain:  Pain Assessment  Pain Assessment: 0-10  0-10 (Numeric) Pain Score: 6  Pain Location: Elbow  Pain Orientation: Right  Pain Radiating Towards: lateral and medial elbow  Pain Descriptors: Sore  Pain Frequency: Constant/continuous  Patient's Stated Pain Goal: No pain    Objective   Healing scar. Lightly scabbed. No excessive redness or drainage.   Edema: moderate throughout FA. Elbow and wrist    Sensory: impaired  Numbness/Tingling: noted today in small finger    Treatment:    Therapy/Activity: Therapeutic Exercise  Therapeutic Exercise Performed: Yes  Therapeutic Exercise Activity 1: AROM of right forearm elbow and wrist with warm up in fluidotherapy with direct therapist supervision; review of home program  Therapeutic Exercise Activity 2: rotation with light hammer 2 x 10 " reps  Therapeutic Exercise Activity 3: wrist curls 2 x 10 extensors and flexors    Therapeutic Activity  Therapeutic Activity Performed: Yes  Therapeutic Activity 1: Borax putty with pinch and  flattening, rolling right hand  Therapeutic Activity 2: marker rolling with forearm and wrist/palm    Manual Therapy  Manual Therapy Performed: Yes  Manual Therapy Activity 1: STM of right forearm and incision; retrograde edema massage;   Post-tx pain:  6/10, no increase, no change;     Assessment/Plan Pt demonstrates no change in pain, and fair compliance with (over) use, follow through with exercises, stretching and ROM; Pt is limited by pain and edema. Pt to follow through with use of ice and heat along with edema control strategies, elbow AROM and straight arm wrist curls as tolerated.   OP EDUCATION:  Education  Individual(s) Educated: Patient  Education Provided: Diagnosis & Precautions, Symptom management, POC discussed and agreed upon, Wound care, Risk and benefits of OT discussed with patient or other  Home Program: AROM, Activity modification, Modalities, Orthotic wearing schedule, care and precautions, Wound/scar care, Handout issued  Diagnosis and Precautions: pain and weakness right  and wrist weakness  Equipment: Cylindical foam  Risk and Benefits Discussed with Patient/Caregiver/Other: yes  Patient/Caregiver Demonstrated Understanding: yes  Plan of Care Discussed and Agreed Upon: yes  Patient Response to Education: Patient/Caregiver Verbalized Understanding of Information, Patient/Caregiver Performed Return Demonstration of Exercises/Activities, Patient/Caregiver Asked Appropriate Questions    Goals:  Active       OT Problem       Patient will demo pain free  of 45 lbs with extended elbow for right UE bathing grooming and carrying groceries. (Progressing)       Start:  11/12/24    Expected End:  02/10/25            Pt will demo full right elbow ROM 0/135 degrees, wrist and forearm with good strength  5/5 MMT for carrying laundry and riding cycle. (Progressing)       Start:  11/12/24    Expected End:  02/10/25            PATIENT WILL SHOW A SIGNIFICANT CHANGE IN UEFI PATIENT REPORTED OUTCOME TOOL TO DEMONSTRATE SUBJECTIVE IMPROVEMENT (Progressing)       Start:  11/12/24    Expected End:  02/10/25            PATIENT WILL DEMONSTRATE INDEPENDENCE IN HOME PROGRAM FOR SUPPORT OF PROGRESSION (Progressing)       Start:  11/12/24    Expected End:  02/10/25            PATIENT WILL REPORT PAIN OF 0-2/10 DEMONSTRATING A REDUCTION OF OVERALL PAIN (Progressing)       Start:  11/12/24    Expected End:  02/10/25

## 2024-11-30 PROCEDURE — RXMED WILLOW AMBULATORY MEDICATION CHARGE

## 2024-12-02 ENCOUNTER — SPECIALTY PHARMACY (OUTPATIENT)
Dept: PHARMACY | Facility: CLINIC | Age: 54
End: 2024-12-02

## 2024-12-03 ENCOUNTER — PHARMACY VISIT (OUTPATIENT)
Dept: PHARMACY | Facility: CLINIC | Age: 54
End: 2024-12-03
Payer: COMMERCIAL

## 2024-12-03 ENCOUNTER — TREATMENT (OUTPATIENT)
Dept: OCCUPATIONAL THERAPY | Facility: CLINIC | Age: 54
End: 2024-12-03
Payer: COMMERCIAL

## 2024-12-03 DIAGNOSIS — M77.11 LATERAL EPICONDYLITIS, RIGHT ELBOW: Primary | ICD-10-CM

## 2024-12-03 PROCEDURE — 97140 MANUAL THERAPY 1/> REGIONS: CPT | Mod: GO | Performed by: OCCUPATIONAL THERAPIST

## 2024-12-03 PROCEDURE — 97110 THERAPEUTIC EXERCISES: CPT | Mod: GO | Performed by: OCCUPATIONAL THERAPIST

## 2024-12-03 PROCEDURE — 97530 THERAPEUTIC ACTIVITIES: CPT | Mod: GO | Performed by: OCCUPATIONAL THERAPIST

## 2024-12-03 ASSESSMENT — PAIN - FUNCTIONAL ASSESSMENT: PAIN_FUNCTIONAL_ASSESSMENT: 0-10

## 2024-12-03 ASSESSMENT — PAIN SCALES - GENERAL: PAINLEVEL_OUTOF10: 4

## 2024-12-03 NOTE — PROGRESS NOTES
"Occupational Therapy Treatment  Patient Name: Sue Schafer  MRN: [id number]  OT Received on: 12/3/2024      Insurance:  Visit Number:  3 of 12  Time:  Time Calculation  Start Time: 0846  Stop Time: 0929  Time Calculation (min): 43 min  OT Modalities Time Entry  Ultrasound Time Entry: 5  OT Therapeutic Procedures Time Entry  Manual Therapy Time Entry: 13  Therapeutic Activity Time Entry: 10  Therapeutic Exercise Time Entry: 15  11/19/24 OT AUTH - OT AUTH RECEIVED FOR 12 VISITS FOR [id number], [id number], 44596, 18093 FROM 11/12/24 TO 12/31/24 11/11/24 - OT - [facility] / EVAL ONLY / 30 V LIMIT YEAR OT/PT - 0 USED 11/11/24 / DED & OOP MET / ANTHEM CHAT REF #  [id number]  JS   **2 VISITS USED OF CURRENT AUTH**   Subjective   Problem List Items Addressed This Visit             ICD-10-CM    Lateral epicondylitis, right elbow - Primary M77.11     Referred by: Dr. Robin, next follow up 12/5/24  Patient reports \" I feel better \".  Pt using heat and ice. Tylenol prn Patient wearing compression sleeve at work. Goals and precautions reviewed with patient. She verbalized understanding.     Precautions: NWB  ,Patient has a tape allergy    Performing HEP?: Yes    Pain:  Pain Assessment  Pain Assessment: 0-10  0-10 (Numeric) Pain Score: 4  Pain Location: Elbow  Pain Orientation: Right  Pain Radiating Towards: posterior elbow  lateral forearm  Pain Descriptors: Tightness, Sore, Heaviness  Pain Frequency: Constant/continuous  Patient's Stated Pain Goal: No pain    Objective   Healing scar, mild redness .   Edema: minimal throughout forearm    Sensory: intact to light touch  Numbness/Tingling: noted today in small finger    Treatment:    Therapy/Activity: Therapeutic Exercise  Therapeutic Exercise Performed: Yes  Therapeutic Exercise Activity 1: AROM of right forearm elbow and wrist with warm up with hp with US 3 mhz 5 mins elbow forearm with during review of home program  Therapeutic Exercise Activity 2: continue with rotation with " light hammer 2 x 10 reps  Therapeutic Exercise Activity 3: wrist curls 2 x 10 extensors and flexors  Therapeutic Exercise Activity 4: bicep curls with 1 lb x 8 reps seated    Therapeutic Activity  Therapeutic Activity Performed: Yes  Therapeutic Activity 1: UBE level 2 6 minutes forward and reverse  Therapeutic Activity 2: Pulleys, face/rear 3 mins no resistance, tolerated fair with left shoulder R elbow ext flex improvement    Manual Therapy  Manual Therapy Performed: Yes  Manual Therapy Activity 1: STM of right forearm and incision; retrograde edema massage;   Post-tx pain:  4/10, no increase, no change;     Assessment/Plan Pt tolerated increase in program, light stretching and light resistance; continue with exercises, stretching and ROM; Pt is limited by pain and edema. Pt to follow through with use of ice and heat along with edema control strategies, elbow AROM and straight arm wrist curls as tolerated.   OP EDUCATION:  Education  Individual(s) Educated: Patient  Education Provided: Diagnosis & Precautions, Symptom management, POC discussed and agreed upon, Wound care, Risk and benefits of OT discussed with patient or other  Home Program: AROM, Activity modification, Modalities, Orthotic wearing schedule, care and precautions, Wound/scar care, Handout issued  Diagnosis and Precautions: pain and weakness right  and wrist weakness  Equipment: Cylindical foam  Risk and Benefits Discussed with Patient/Caregiver/Other: yes  Patient/Caregiver Demonstrated Understanding: yes  Plan of Care Discussed and Agreed Upon: yes  Patient Response to Education: Patient/Caregiver Verbalized Understanding of Information, Patient/Caregiver Performed Return Demonstration of Exercises/Activities, Patient/Caregiver Asked Appropriate Questions    Goals:  Active       OT Problem       Patient will demo pain free  of 45 lbs with extended elbow for right UE bathing grooming and carrying groceries. (Progressing)       Start:   11/12/24    Expected End:  02/10/25            Pt will demo full right elbow ROM 0/135 degrees, wrist and forearm with good strength 5/5 MMT for carrying laundry and riding cycle. (Progressing)       Start:  11/12/24    Expected End:  02/10/25            PATIENT WILL SHOW A SIGNIFICANT CHANGE IN UEFI PATIENT REPORTED OUTCOME TOOL TO DEMONSTRATE SUBJECTIVE IMPROVEMENT (Progressing)       Start:  11/12/24    Expected End:  02/10/25            PATIENT WILL DEMONSTRATE INDEPENDENCE IN HOME PROGRAM FOR SUPPORT OF PROGRESSION (Progressing)       Start:  11/12/24    Expected End:  02/10/25            PATIENT WILL REPORT PAIN OF 0-2/10 DEMONSTRATING A REDUCTION OF OVERALL PAIN (Progressing)       Start:  11/12/24    Expected End:  02/10/25

## 2024-12-04 DIAGNOSIS — M05.79 SEROPOSITIVE RHEUMATOID ARTHRITIS OF MULTIPLE SITES (MULTI): Primary | ICD-10-CM

## 2024-12-04 RX ORDER — KETOROLAC TROMETHAMINE 30 MG/ML
60 INJECTION, SOLUTION INTRAMUSCULAR; INTRAVENOUS ONCE
Status: SHIPPED | OUTPATIENT
Start: 2024-12-04 | End: 2024-12-09

## 2024-12-04 RX ORDER — METHYLPREDNISOLONE ACETATE 80 MG/ML
80 INJECTION, SUSPENSION INTRA-ARTICULAR; INTRALESIONAL; INTRAMUSCULAR; SOFT TISSUE ONCE
Status: SHIPPED | OUTPATIENT
Start: 2024-12-04

## 2024-12-05 NOTE — PROGRESS NOTES
Insurance will not cover IV Orencia, discontinuing IV orders, patient filling subcutaneous Orencia again

## 2024-12-10 ENCOUNTER — TREATMENT (OUTPATIENT)
Dept: OCCUPATIONAL THERAPY | Facility: CLINIC | Age: 54
End: 2024-12-10
Payer: COMMERCIAL

## 2024-12-10 DIAGNOSIS — M77.11 LATERAL EPICONDYLITIS, RIGHT ELBOW: Primary | ICD-10-CM

## 2024-12-10 PROCEDURE — 97140 MANUAL THERAPY 1/> REGIONS: CPT | Mod: GO | Performed by: OCCUPATIONAL THERAPIST

## 2024-12-10 PROCEDURE — 97530 THERAPEUTIC ACTIVITIES: CPT | Mod: GO | Performed by: OCCUPATIONAL THERAPIST

## 2024-12-10 ASSESSMENT — PAIN SCALES - GENERAL: PAINLEVEL_OUTOF10: 4

## 2024-12-10 ASSESSMENT — PAIN - FUNCTIONAL ASSESSMENT: PAIN_FUNCTIONAL_ASSESSMENT: 0-10

## 2024-12-10 NOTE — PROGRESS NOTES
"Occupational Therapy Treatment  Patient Name: Sue Schafer  MRN: [id number]  OT Received on: 12/10/2024      Insurance:  Visit Number:  4 of 12  Time:  Time Calculation  Start Time: 0827  Stop Time: 0900  Time Calculation (min): 33 min  OT Therapeutic Procedures Time Entry  Manual Therapy Time Entry: 13  Therapeutic Activity Time Entry: 20 11/19/24 OT AUTH - OT AUTH RECEIVED FOR 12 VISITS FOR [id number], 04557, 01047, 20642 FROM 11/12/24 TO 12/31/24 11/11/24 - OT - [facility] / EVAL ONLY / 30 V LIMIT YEAR OT/PT - 0 USED 11/11/24 / DED   **2 VISITS USED OF CURRENT AUTH**   Subjective   Problem List Items Addressed This Visit             ICD-10-CM    Lateral epicondylitis, right elbow - Primary M77.11       Referred by: Dr. Robin, next follow up 12/5/24  Patient reports \" I saw Dr. Robin, and I have a new dx of biceps tendinitis\".  Pt using heat and ice. Tylenol prn Patient wearing compression sleeve at work. Goals and precautions reviewed with patient. She verbalized understanding. Added referral to the chart.    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   Edema: minimal throughout forearm   strength:  (level II, average 3 trials)  Right 5 lbs.   Left 50 lbs.;  Pinch strength:  Key, 3 jaw real, 2 point  Right 5, 6, 9 lbs.   Left 13, 13, 13 lbs.     Sensory: intact to light touch  Numbness/Tingling: noted today in small finger    Treatment:    Therapy/Activity: Therapeutic Exercise  Therapeutic Exercise Performed: Yes  Therapeutic Exercise Activity 1: Red flex bar with pronation /supination 10 " reps  Therapeutic Exercise Activity 2: continue with rotation with light hammer 2 x 10 reps  Therapeutic Exercise Activity 3: wrist curls 2 x 10 extensors and flexors  Therapeutic Exercise Activity 4: bicep curls with 1 lb x 8 reps seated    Therapeutic Activity  Therapeutic Activity Performed: Yes  Therapeutic Activity 1: UBE level 2 6 minutes forward and reverse  Therapeutic Activity 2: Pulleys, face/rear 3 mins no resistance, tolerated fair with left shoulder R elbow ext flex improvement  Therapeutic Activity 3: Borax theraputty pinch and     Manual Therapy  Manual Therapy Performed: Yes  Manual Therapy Activity 1: STM of right forearm and incision; retrograde edema massage;   Post-tx pain:  4/10, no increase, no change;     Assessment/Plan  Pt arrived late due to Dr damon and completed pulleys for elbow and biceps, continue light stretching and light resistance; upgrade program to include biceps tendon exercises, stretching and ROM; Pt is limited by pain and edema. Pt to follow through with use of ice and heat along with edema control strategies, elbow AROM and straight arm wrist curls as tolerated.   OP EDUCATION:  Education  Individual(s) Educated: Patient  Education Provided: Diagnosis & Precautions, Symptom management, POC discussed and agreed upon, Wound care, Risk and benefits of OT discussed with patient or other  Home Program: AROM, Activity modification, Modalities, Orthotic wearing schedule, care and precautions, Wound/scar care, Handout issued  Diagnosis and Precautions: pain and weakness right  and wrist weakness  Equipment: Cylindical foam  Risk and Benefits Discussed with Patient/Caregiver/Other: yes  Patient/Caregiver Demonstrated Understanding: yes  Plan of Care Discussed and Agreed Upon: yes  Patient Response to Education: Patient/Caregiver Verbalized Understanding of Information, Patient/Caregiver Performed Return Demonstration of Exercises/Activities, Patient/Caregiver Asked Appropriate  Questions    Goals:  Active       OT Problem       Patient will demo pain free  of 45 lbs with extended elbow for right UE bathing grooming and carrying groceries. (Progressing)       Start:  11/12/24    Expected End:  02/10/25            Pt will demo full right elbow ROM 0/135 degrees, wrist and forearm with good strength 5/5 MMT for carrying laundry and riding cycle. (Progressing)       Start:  11/12/24    Expected End:  02/10/25            PATIENT WILL SHOW A SIGNIFICANT CHANGE IN UEFI PATIENT REPORTED OUTCOME TOOL TO DEMONSTRATE SUBJECTIVE IMPROVEMENT (Progressing)       Start:  11/12/24    Expected End:  02/10/25            PATIENT WILL DEMONSTRATE INDEPENDENCE IN HOME PROGRAM FOR SUPPORT OF PROGRESSION (Progressing)       Start:  11/12/24    Expected End:  02/10/25            PATIENT WILL REPORT PAIN OF 0-2/10 DEMONSTRATING A REDUCTION OF OVERALL PAIN (Progressing)       Start:  11/12/24    Expected End:  02/10/25

## 2024-12-14 ENCOUNTER — SPECIALTY PHARMACY (OUTPATIENT)
Dept: PHARMACY | Facility: CLINIC | Age: 54
End: 2024-12-14

## 2024-12-17 ENCOUNTER — TELEPHONE (OUTPATIENT)
Dept: PRIMARY CARE | Facility: CLINIC | Age: 54
End: 2024-12-17
Payer: COMMERCIAL

## 2024-12-17 ENCOUNTER — TREATMENT (OUTPATIENT)
Dept: OCCUPATIONAL THERAPY | Facility: CLINIC | Age: 54
End: 2024-12-17
Payer: COMMERCIAL

## 2024-12-17 ENCOUNTER — PATIENT MESSAGE (OUTPATIENT)
Dept: PRIMARY CARE | Facility: CLINIC | Age: 54
End: 2024-12-17
Payer: COMMERCIAL

## 2024-12-17 DIAGNOSIS — M77.11 LATERAL EPICONDYLITIS, RIGHT ELBOW: ICD-10-CM

## 2024-12-17 DIAGNOSIS — J45.909 ASTHMA, UNSPECIFIED ASTHMA SEVERITY, UNSPECIFIED WHETHER COMPLICATED, UNSPECIFIED WHETHER PERSISTENT (HHS-HCC): ICD-10-CM

## 2024-12-17 DIAGNOSIS — F90.0 ATTENTION DEFICIT HYPERACTIVITY DISORDER, INATTENTIVE TYPE, MILD: ICD-10-CM

## 2024-12-17 RX ORDER — ALBUTEROL SULFATE 90 UG/1
INHALANT RESPIRATORY (INHALATION)
Qty: 18 G | Refills: 3 | Status: SHIPPED | OUTPATIENT
Start: 2024-12-17 | End: 2024-12-20 | Stop reason: SDUPTHER

## 2024-12-17 RX ORDER — DEXTROAMPHETAMINE SULFATE, DEXTROAMPHETAMINE SACCHARATE, AMPHETAMINE SULFATE AND AMPHETAMINE ASPARTATE 5; 5; 5; 5 MG/1; MG/1; MG/1; MG/1
20 CAPSULE, EXTENDED RELEASE ORAL EVERY MORNING
Qty: 30 CAPSULE | Refills: 0 | Status: SHIPPED | OUTPATIENT
Start: 2024-12-17 | End: 2025-01-16

## 2024-12-17 NOTE — TELEPHONE ENCOUNTER
[facility] about Patients adderall asking if it is supposed to be XR? Said before it was just regular.     They also are asking about the Albuterol, they are saying there is no directions.

## 2024-12-19 ENCOUNTER — PATIENT MESSAGE (OUTPATIENT)
Dept: PRIMARY CARE | Facility: CLINIC | Age: 54
End: 2024-12-19
Payer: COMMERCIAL

## 2024-12-19 DIAGNOSIS — J45.909 ASTHMA, UNSPECIFIED ASTHMA SEVERITY, UNSPECIFIED WHETHER COMPLICATED, UNSPECIFIED WHETHER PERSISTENT (HHS-HCC): ICD-10-CM

## 2024-12-20 DIAGNOSIS — J45.909 ASTHMA, UNSPECIFIED ASTHMA SEVERITY, UNSPECIFIED WHETHER COMPLICATED, UNSPECIFIED WHETHER PERSISTENT (HHS-HCC): ICD-10-CM

## 2024-12-20 PROCEDURE — RXMED WILLOW AMBULATORY MEDICATION CHARGE

## 2024-12-20 RX ORDER — ALBUTEROL SULFATE 90 UG/1
2 INHALANT RESPIRATORY (INHALATION) EVERY 4 HOURS PRN
Qty: 18 G | Refills: 3 | Status: SHIPPED | OUTPATIENT
Start: 2024-12-20

## 2024-12-20 RX ORDER — ALBUTEROL SULFATE 90 UG/1
INHALANT RESPIRATORY (INHALATION)
Qty: 18 G | Refills: 3 | Status: SHIPPED | OUTPATIENT
Start: 2024-12-20 | End: 2024-12-20 | Stop reason: SDUPTHER

## 2024-12-23 ENCOUNTER — PHARMACY VISIT (OUTPATIENT)
Dept: PHARMACY | Facility: CLINIC | Age: 54
End: 2024-12-23
Payer: COMMERCIAL

## 2024-12-24 ENCOUNTER — TREATMENT (OUTPATIENT)
Dept: OCCUPATIONAL THERAPY | Facility: CLINIC | Age: 54
End: 2024-12-24
Payer: COMMERCIAL

## 2024-12-24 DIAGNOSIS — M77.11 LATERAL EPICONDYLITIS, RIGHT ELBOW: ICD-10-CM

## 2024-12-24 NOTE — PROGRESS NOTES
Occupational Therapy Treatment  Patient Name: Sue Schafer  MRN: [id number]  OT Received on: 12/24/2024      Insurance:  Visit Number:  5 of 12  Time:  Time Calculation  Start Time: 0840  Stop Time: 0930  Time Calculation (min): 50 min  OT Therapeutic Procedures Time Entry  Manual Therapy Time Entry: 20  Therapeutic Exercise Time Entry: 30 11/19/24 OT AUTH - OT AUTH RECEIVED FOR 12 VISITS FOR 70570, 04806, 29654, 32946 FROM 11/12/24 TO 12/31/24 11/11/24 - OT - [facility] / EVAL ONLY / 30 V LIMIT YEAR OT/PT - 0 USED 11/11/24 / DED   **2 VISITS USED OF CURRENT AUTH**   Subjective   Problem List Items Addressed This Visit             ICD-10-CM    Lateral epicondylitis, right elbow M77.11       Referred by: Dr. Robin, next follow up 12/5/24  Patient reports : She is not eating with her right arm yet.     Precautions: NWB  ,Patient has a tape allergy    Performing HEP?: Yes    Pain:4/10 at rest 6-7/10 with end range stretching       Objective   Healing scar, mild redness .   Edema: minimal throughout forearm   strength:  (level II, average 3 trials)  Right 5 lbs.   Left 50 lbs.;  Pinch strength:  Key, 3 jaw real, 2 point  Right 5, 6, 9 lbs.   Left 13, 13, 13 lbs.     Sensory: intact to light touch  Numbness/Tingling: noted today in small finger    Treatment:    Moist heat to elbow pre tx for 10 min  Therapeutic exercise:  AROM elbow flexion/extension  Supination/pronation  Holding dowel: rows x 10 reps  Shoulder/elbow extensions x 10 reps  Bicep stretch in doorway 4x 20 sec  Hammer stretch for supination/pronation: x 2 min  Supine: elbow flexion/extension x 12 reps  Supine holding dowel chest press 2x 5 reps  Supine holding dowel: elevation 2x 5 reps  Active wrist extension with eccentric focus x 10 reps  Pulleys: x 20 reps  Manual therapy: AAROM elbow flexion/extension, supination/pronation  STM to biceps/triceps/forearm musculature  AAROM wrist flexion/extension stretch  CFM to distal biceps          Post-tx pain:  4-6/10, no increase, no change;     Assessment/Plan  Patient is very guarded and fearful of end range stretching. Post manual therapy elbow PROM 0/140 actively post tx. -24/141. Patient encouraged to perform HEP  regularly as instructed and try to use right arm for light functional use such as eating to increase functional ROM. Patient tolerated OT tx well with encouragement. Progresssing steadily toward goals. Continue with POC.      OP EDUCATION:   Reviewed dx, healing and precautions.     Goals:  Active       OT Problem       Patient will demo pain free  of 45 lbs with extended elbow for right UE bathing grooming and carrying groceries. (Progressing)       Start:  11/12/24    Expected End:  02/10/25            Pt will demo full right elbow ROM 0/135 degrees, wrist and forearm with good strength 5/5 MMT for carrying laundry and riding cycle. (Progressing)       Start:  11/12/24    Expected End:  02/10/25            PATIENT WILL SHOW A SIGNIFICANT CHANGE IN UEFI PATIENT REPORTED OUTCOME TOOL TO DEMONSTRATE SUBJECTIVE IMPROVEMENT (Progressing)       Start:  11/12/24    Expected End:  02/10/25            PATIENT WILL DEMONSTRATE INDEPENDENCE IN HOME PROGRAM FOR SUPPORT OF PROGRESSION (Progressing)       Start:  11/12/24    Expected End:  02/10/25            PATIENT WILL REPORT PAIN OF 0-2/10 DEMONSTRATING A REDUCTION OF OVERALL PAIN (Progressing)       Start:  11/12/24    Expected End:  02/10/25

## 2024-12-26 ENCOUNTER — PHARMACY VISIT (OUTPATIENT)
Dept: PHARMACY | Facility: CLINIC | Age: 54
End: 2024-12-26
Payer: COMMERCIAL

## 2024-12-31 ENCOUNTER — APPOINTMENT (OUTPATIENT)
Dept: RHEUMATOLOGY | Facility: CLINIC | Age: 54
End: 2024-12-31
Payer: COMMERCIAL

## 2024-12-31 ENCOUNTER — TREATMENT (OUTPATIENT)
Dept: OCCUPATIONAL THERAPY | Facility: CLINIC | Age: 54
End: 2024-12-31
Payer: COMMERCIAL

## 2024-12-31 DIAGNOSIS — M77.11 LATERAL EPICONDYLITIS, RIGHT ELBOW: ICD-10-CM

## 2024-12-31 PROCEDURE — 97110 THERAPEUTIC EXERCISES: CPT | Mod: GO,CO

## 2024-12-31 ASSESSMENT — PAIN - FUNCTIONAL ASSESSMENT: PAIN_FUNCTIONAL_ASSESSMENT: 0-10

## 2024-12-31 ASSESSMENT — PAIN SCALES - GENERAL: PAINLEVEL_OUTOF10: 4

## 2024-12-31 NOTE — PROGRESS NOTES
"Occupational Therapy Treatment  Patient Name: Sue Schafer  MRN: [id number]  OT Received on: 12/31/2024 OT Received On: 12/31/24    Insurance:  Visit Number: 6 of 12 Patient has been approved to continue visits through 2025  Time:  Time Calculation  Start Time: 0948  Stop Time: 1037  Time Calculation (min): 49 min  OT Therapeutic Procedures Time Entry  Therapeutic Exercise Time Entry: 49  11/19/24 OT AUTH - OT AUTH RECEIVED FOR 12 VISITS FOR 62012, 55430, 22379, 00794 FROM 11/12/24 TO 12/31/24  AUTH # [id number]  JS     11/11/24 - OT - [facility] / EVAL ONLY / 30 V LIMIT YEAR OT/PT - 0 USED 11/11/24 / DED & OOP MET / ANTHEM CHAT REF #  [id number]  JS   **2 VISITS USED OF CURRENT AUTH**   Subjective   Problem List Items Addressed This Visit             ICD-10-CM       Musculoskeletal and Injuries    Lateral epicondylitis, right elbow M77.11     Referred by: Dr. Robin, next follow up 12/5/24  Patient reports \" I am pretty sore all over in my R side today. I feel the weather coming \".  Pt using heat and ice. Tylenol the last 4 of 7 days at night time/600 mg ibuprofen.  Patient wearing compression sleeve at work. Goals and precautions reviewed with patient. She verbalized understanding.     Precautions: 5#   ,Patient has a tape allergy    Performing HEP?: Yes    Pain:  Pain Assessment  Pain Assessment: 0-10  0-10 (Numeric) Pain Score: 4  Pain Location: Elbow  Pain Orientation: Right  Pain Radiating Towards: goes to 8-9/10 with reaching low with outstretched arm  Pain Descriptors: Dull, Shooting, Aching  Pain Frequency: Intermittent  Patient's Stated Pain Goal: No pain    Objective   Hard, tender scar. Pt completing scar massage daily.   Edema: moderate throughout FA. Elbow and wrist   strength:   Right 8#  (was 5 lbs. )   Left 51# ( was 50 lbs.);  Pinch strength:  Key, 3 jaw real, 2 point  Right 10 ( was 5)  7 ( was 6) , 9( same)  lbs.   Left 13.5( was 13)  13.5( was 13 )11( same) lbs.     UEFI completed with " patient today at 26/80 (was 16/80 at [facility])     Sensory: impaired  Numbness/Tingling: continues to be noted once in a while in small finger intermittently    Treatment:  Modalities:  Moist heat applied x 5 min at start of session during goal review to promote muscle movement during session  Pt did not want ice at end of session  Therapeutic Exercise:  Long hold  over elevated surface x 3 min for bicep ext at 1#resistance  Supine bicep ext/tricep ext 1 x 10 reps with 1# resistance. Arm started to spasm, held exercises at that time.   Pulleys x 3 min with focus on hold at end range  Arm bike x 6 min level 3  Putty HEP initiated today, ( low resistance tan) . Written program provided.  Pt completed 1 set of 10 reps each for gross grasp, individual pinch  Post-tx pain:5 /10, slightly increased since start of session    Assessment/Plan Pt motivated to return to function. Limited by pain and edema. Pt to follow through with use of ice and heat along with edema control strategies, elbow AROM while supine and added putty. Noted increased score on UEFI and increased  and pinch scores.   OP EDUCATION:  Education  Individual(s) Educated: Patient  Education Provided: Diagnosis & Precautions, Symptom management, POC discussed and agreed upon, Wound care, Risk and benefits of OT discussed with patient or other  Home Program: AROM, Activity modification, Modalities, Orthotic wearing schedule, care and precautions, Wound/scar care, Handout issued  Diagnosis and Precautions: pain and weakness right  and wrist weakness  Equipment: Cylindical foam  Risk and Benefits Discussed with Patient/Caregiver/Other: yes  Patient/Caregiver Demonstrated Understanding: yes  Plan of Care Discussed and Agreed Upon: yes  Patient Response to Education: Patient/Caregiver Verbalized Understanding of Information, Patient/Caregiver Performed Return Demonstration of Exercises/Activities, Patient/Caregiver Asked Appropriate Questions    Goals:  Active        OT Problem       Patient will demo pain free  of 45 lbs with extended elbow for right UE bathing grooming and carrying groceries. (Progressing)       Start:  11/12/24    Expected End:  02/10/25            Pt will demo full right elbow ROM 0/135 degrees, wrist and forearm with good strength 5/5 MMT for carrying laundry and riding cycle. (Progressing)       Start:  11/12/24    Expected End:  02/10/25            PATIENT WILL SHOW A SIGNIFICANT CHANGE IN UEFI PATIENT REPORTED OUTCOME TOOL TO DEMONSTRATE SUBJECTIVE IMPROVEMENT (Progressing)       Start:  11/12/24    Expected End:  02/10/25            PATIENT WILL DEMONSTRATE INDEPENDENCE IN HOME PROGRAM FOR SUPPORT OF PROGRESSION (Progressing)       Start:  11/12/24    Expected End:  02/10/25            PATIENT WILL REPORT PAIN OF 0-2/10 DEMONSTRATING A REDUCTION OF OVERALL PAIN (Progressing)       Start:  11/12/24    Expected End:  02/10/25

## 2025-01-07 ENCOUNTER — TREATMENT (OUTPATIENT)
Dept: OCCUPATIONAL THERAPY | Facility: CLINIC | Age: 55
End: 2025-01-07
Payer: COMMERCIAL

## 2025-01-07 DIAGNOSIS — M77.11 LATERAL EPICONDYLITIS, RIGHT ELBOW: ICD-10-CM

## 2025-01-07 PROCEDURE — 97110 THERAPEUTIC EXERCISES: CPT | Mod: GO,CO

## 2025-01-07 ASSESSMENT — PAIN - FUNCTIONAL ASSESSMENT: PAIN_FUNCTIONAL_ASSESSMENT: 0-10

## 2025-01-07 ASSESSMENT — PAIN SCALES - GENERAL: PAINLEVEL_OUTOF10: 6

## 2025-01-07 NOTE — PROGRESS NOTES
"Occupational Therapy Treatment  Patient Name: Sue Schafer  MRN: [id number]  OT Received on: 1/7/2025 OT Received On: 01/07/25    Insurance:  Visit Number: 7 of 12 Patient has been approved to continue visits through 2025  Time:  Time Calculation  Start Time: 0401  Stop Time: 0510  Time Calculation (min): 69 min  OT Therapeutic Procedures Time Entry  Therapeutic Exercise Time Entry: 791/6/25 - J - 2025 VERIFIED - ANTHEM [facility] / EVAL ONLY / 30 V YEAR - 0 USED 1/6/25 / DED 1750 NOT MET - OOP 2550 NOT MET / [employer] # [id number]  OT AUTH FOR 2025 RECEIVED FOR 10 VISITS FOR 26660, 61769, 24018, 33944 FROM 1/1/25 TO 2/28/25 AUTH # [id number]      Subjective   Problem List Items Addressed This Visit             ICD-10-CM       Musculoskeletal and Injuries    Lateral epicondylitis, right elbow M77.11     Referred by: Dr. Robin, next follow up 1/23/25  Patient reports \" my elbow is pretty sore today. It's pretty swollen\".  Pt using heat, ice PRN at work. Tylenol the last 3 of 7 ( was  4 of 7)  days at night time/600 mg ibuprofen also daytime PRN at daytime.  Patient wearing compression sleeve at work. Goals and precautions reviewed with patient. She verbalized understanding. Pt is taking a biologic once a week for inflammation.     Precautions: 5#   ,Patient has a tape allergy    Performing HEP?: Yes    Pain:6/10, achy in lateral aspect of R elbow, noting sharp with extended arm reaching activity , radiating down arm towards wrist\"feels like a hot thread pulling\".   Pain Assessment  Pain Assessment: 0-10  0-10 (Numeric) Pain Score: 6  Pain Location: Elbow  Pain Orientation: Right  Patient's Stated Pain Goal: No pain  Objective     Hard, tender scar. Reviewed and Pt  is completing scar massage daily.   Edema: continued moderate throughout FA. Elbow and wrist  Sensory: impaired  Numbness/Tingling: continues to be noted once in a while in small finger intermittently    Treatment:  Modalities:  Moist heat applied x 5 " min at start of session during goal review to promote muscle movement during session  Ice applied  at end of session x 5 min to reduce edema and inflammation dur ing wrap up  Therapeutic Exercise:  Reviewed Long hold  over elevated surface x 3 min for bicep ext at 1#resistance  Reviewed Supine bicep ext/tricep ext 1 x 10 reps without  resistance.   Pulleys x 3 min with focus on hold at end range, educated in importance of home set of pulleys  Arm bike x 6 min level 3.5 ( was 3.0)   Increased theraputty to low/medium resistance yellow (was low resistance tan) . To complete 1 set of 10 reps each for gross grasp, individual pinch  Initiated  median nerve glides  1-6 x 10 sec hold x 5 reps , written HEP provided. Pt to progress to 8-10 reps prior to next session  Educated pt in supination stretch  Educated pt in low resistance ER/IR HEP 2 x 5 reps with 5 sec hold, to increase to 2 sets of 15 reps. Written  HEP provided   Post-tx pain:6 /10, no change since start of session    Assessment/Plan Pt motivated to return to function. Limited by pain and edema. Pt to follow through with use of ice and heat along with edema control strategies, elbow AROM while supine and added resistance  putty and low resistance foam.    Education  Individual(s) Educated: Patient  Education Provided: Diagnosis & Precautions, Symptom management, POC discussed and agreed upon, Wound care, Risk and benefits of OT discussed with patient or other  Home Program: AROM, Activity modification, Modalities, Orthotic wearing schedule, care and precautions, Wound/scar care, Handout issued  Diagnosis and Precautions: pain and weakness right  and wrist weakness  Equipment: Thera-putty  Risk and Benefits Discussed with Patient/Caregiver/Other: yes  Patient/Caregiver Demonstrated Understanding: yes  Plan of Care Discussed and Agreed Upon: yes  Patient Response to Education: Patient/Caregiver Verbalized Understanding of Information, Patient/Caregiver  Performed Return Demonstration of Exercises/Activities, Patient/Caregiver Asked Appropriate Questions    Goals:  Active       OT Problem       Patient will demo pain free  of 45 lbs with extended elbow for right UE bathing grooming and carrying groceries. (Progressing)       Start:  11/12/24    Expected End:  02/10/25            Pt will demo full right elbow ROM 0/135 degrees, wrist and forearm with good strength 5/5 MMT for carrying laundry and riding cycle. (Progressing)       Start:  11/12/24    Expected End:  02/10/25            PATIENT WILL SHOW A SIGNIFICANT CHANGE IN UEFI PATIENT REPORTED OUTCOME TOOL TO DEMONSTRATE SUBJECTIVE IMPROVEMENT (Progressing)       Start:  11/12/24    Expected End:  02/10/25            PATIENT WILL DEMONSTRATE INDEPENDENCE IN HOME PROGRAM FOR SUPPORT OF PROGRESSION (Progressing)       Start:  11/12/24    Expected End:  02/10/25            PATIENT WILL REPORT PAIN OF 0-2/10 DEMONSTRATING A REDUCTION OF OVERALL PAIN (Progressing)       Start:  11/12/24    Expected End:  02/10/25

## 2025-01-08 DIAGNOSIS — R30.0 DYSURIA: Primary | ICD-10-CM

## 2025-01-08 DIAGNOSIS — B99.9 INFECTION: Primary | ICD-10-CM

## 2025-01-08 RX ORDER — AMOXICILLIN AND CLAVULANATE POTASSIUM 875; 125 MG/1; MG/1
875 TABLET, FILM COATED ORAL 2 TIMES DAILY
Qty: 28 TABLET | Refills: 0 | Status: SHIPPED | OUTPATIENT
Start: 2025-01-08 | End: 2025-01-22

## 2025-01-08 RX ORDER — PHENAZOPYRIDINE HYDROCHLORIDE 200 MG/1
200 TABLET, FILM COATED ORAL 3 TIMES DAILY PRN
Qty: 10 TABLET | Refills: 0 | Status: SHIPPED | OUTPATIENT
Start: 2025-01-08 | End: 2025-01-11

## 2025-01-10 ENCOUNTER — PHARMACY VISIT (OUTPATIENT)
Dept: PHARMACY | Facility: CLINIC | Age: 55
End: 2025-01-10
Payer: COMMERCIAL

## 2025-01-10 PROCEDURE — RXMED WILLOW AMBULATORY MEDICATION CHARGE

## 2025-01-14 ENCOUNTER — TREATMENT (OUTPATIENT)
Dept: OCCUPATIONAL THERAPY | Facility: CLINIC | Age: 55
End: 2025-01-14
Payer: COMMERCIAL

## 2025-01-14 DIAGNOSIS — M77.11 LATERAL EPICONDYLITIS, RIGHT ELBOW: ICD-10-CM

## 2025-01-14 PROCEDURE — 97110 THERAPEUTIC EXERCISES: CPT | Mod: GO,CO

## 2025-01-14 ASSESSMENT — PAIN SCALES - GENERAL: PAINLEVEL_OUTOF10: 6

## 2025-01-14 ASSESSMENT — PAIN - FUNCTIONAL ASSESSMENT: PAIN_FUNCTIONAL_ASSESSMENT: 0-10

## 2025-01-14 ASSESSMENT — PAIN DESCRIPTION - DESCRIPTORS: DESCRIPTORS: SORE

## 2025-01-14 NOTE — PROGRESS NOTES
"Occupational Therapy Treatment  Patient Name: Sue Schafer  MRN: [id number]  OT Received on: 1/14/2025 OT Received On: 01/14/25    Insurance:  Visit Number: 7 of 12 Patient has been approved to continue visits through 2025  Time:  Time Calculation  Start Time: 1604  Stop Time: 1650  Time Calculation (min): 46 min  OT Therapeutic Procedures Time Entry  Therapeutic Exercise Time Entry: 41  Non-Billable Time  Non-billable time: 5  Non-billable time reason: hot pack  1/6/25 - J - 2025 VERIFIED - AN[facility] / EVAL ONLY / 30 V YEAR - 0 USED 1/6/25 / DED 1750 NOT MET - OOP 2550 NOT MET / [facility]AILITY # [id number]  OT AUTH FOR 2025 RECEIVED FOR 10 VISITS FOR 11289, 80757, 52985, 50206 FROM 1/1/25 TO 2/28/25 AUTH # [id number]  JS    Subjective   Problem List Items Addressed This Visit             ICD-10-CM       Musculoskeletal and Injuries    Lateral epicondylitis, right elbow M77.11     Referred by: Dr. Robin, next follow up 1/23/25  Patient reports \" I am pretty sore starting out today in my right elbow\".  Pt using heat, ice PRN at work. Tylenol the last 3 of 7 ( same) PRN . Heat   Patient wearing compression sleeve at work. Goals and precautions reviewed with patient. She verbalized understanding. Pt is taking a biologic once a week for inflammation.   Precautions: 5# Patient has a tape allergy    Performing HEP?: Yes    Pain:6/10, achy in lateral aspect of R elbow, noting sharp with extended arm reaching activity , radiating down arm towards wrist. Jolts  Pain Assessment  Pain Assessment: 0-10  0-10 (Numeric) Pain Score: 6  Pain Location: Elbow  Pain Orientation: Right  Pain Descriptors: Sore  Pain Frequency: Constant/continuous  Patient's Stated Pain Goal: No pain  Objective      Scar hard in some areas.  Scar massage completed by writer and Reviewed and Pt  is completing scar massage daily. Less hypersensitivity noted  Edema: continued moderate throughout FA. Elbow and wrist  Sensory: impaired  Noted FA \" zings " " and jolts every once in a while\".  Numbness/Tingling: continues to be noted \"once in a while\" in small finger intermittently and radiating up to FA    Treatment:  Modalities:  Moist heat applied x 5 min at start of session during goal review to promote muscle movement during session  Patient did not want ice at end of session  Therapeutic Exercise:  Reviewed Long hold  over elevated surface x 3 min for bicep ext at 1#resistance.  To progress to 2#  Completed dowel unique Supine bicep ext/tricep ext 3 ( was 1) x 10 reps without  resistance.   Discussed importance of use of home pulleys, does not yet have a set  Arm bike x 6 min level 4.5 ( was 3.5)   Increased theraputty to medium resistance orange(was low resistance yellow) . To complete 1 set of 10 reps each for gross grasp,progress to  individual pinch  Completed median nerve glides  1-6 ( 2 reps) , progressed to  include glides 7-13 today, 2 reps.prior to stopping due to increased shoulder pain. To continue as part of HEP as tolerated.   Reviewed supination stretch  Reviewed low resistance ER/IR HEP 2 x 5 reps with 5 sec hold, to increase to 2 sets of 15 reps. To increase to 2 sets of 10 reps prior to next session.   Post-tx pain:6 /10, no change since start of session    Assessment/Plan Pt motivated to return to function. Continues to be Limited by shoulder  pain, nerve pain, tingling  and edema. Pt to follow through with use of ice and heat along with edema control strategies, elbow AROM while supine and added resistance  putty , light theraband ER and IR and low resistance foam. Next visit progress to higher resistance theraband for IR/ER.   Education  Individual(s) Educated: Patient  Education Provided: Diagnosis & Precautions, Symptom management, POC discussed and agreed upon, Wound care, Risk and benefits of OT discussed with patient or other  Home Program: AROM, Activity modification, Modalities, Orthotic wearing schedule, care and precautions, Wound/scar " care, Handout issued  Diagnosis and Precautions: pain and weakness right  and wrist weakness  Equipment: Thera-putty (orange)  Risk and Benefits Discussed with Patient/Caregiver/Other: yes  Patient/Caregiver Demonstrated Understanding: yes  Plan of Care Discussed and Agreed Upon: yes  Patient Response to Education: Patient/Caregiver Verbalized Understanding of Information, Patient/Caregiver Performed Return Demonstration of Exercises/Activities, Patient/Caregiver Asked Appropriate Questions    Goals:  Active       OT Problem       Patient will demo pain free  of 45 lbs with extended elbow for right UE bathing grooming and carrying groceries. (Progressing)       Start:  11/12/24    Expected End:  02/10/25            Pt will demo full right elbow ROM 0/135 degrees, wrist and forearm with good strength 5/5 MMT for carrying laundry and riding cycle. (Progressing)       Start:  11/12/24    Expected End:  02/10/25            PATIENT WILL SHOW A SIGNIFICANT CHANGE IN UEFI PATIENT REPORTED OUTCOME TOOL TO DEMONSTRATE SUBJECTIVE IMPROVEMENT (Progressing)       Start:  11/12/24    Expected End:  02/10/25            PATIENT WILL DEMONSTRATE INDEPENDENCE IN HOME PROGRAM FOR SUPPORT OF PROGRESSION (Progressing)       Start:  11/12/24    Expected End:  02/10/25            PATIENT WILL REPORT PAIN OF 0-2/10 DEMONSTRATING A REDUCTION OF OVERALL PAIN (Progressing)       Start:  11/12/24    Expected End:  02/10/25

## 2025-01-17 ENCOUNTER — SPECIALTY PHARMACY (OUTPATIENT)
Dept: PHARMACY | Facility: CLINIC | Age: 55
End: 2025-01-17

## 2025-01-17 PROCEDURE — RXMED WILLOW AMBULATORY MEDICATION CHARGE

## 2025-01-21 ENCOUNTER — TREATMENT (OUTPATIENT)
Dept: OCCUPATIONAL THERAPY | Facility: CLINIC | Age: 55
End: 2025-01-21
Payer: COMMERCIAL

## 2025-01-21 ENCOUNTER — PHARMACY VISIT (OUTPATIENT)
Dept: PHARMACY | Facility: CLINIC | Age: 55
End: 2025-01-21
Payer: COMMERCIAL

## 2025-01-21 DIAGNOSIS — M77.11 LATERAL EPICONDYLITIS, RIGHT ELBOW: Primary | ICD-10-CM

## 2025-01-21 PROCEDURE — 97140 MANUAL THERAPY 1/> REGIONS: CPT | Mod: GO | Performed by: OCCUPATIONAL THERAPIST

## 2025-01-21 PROCEDURE — 97530 THERAPEUTIC ACTIVITIES: CPT | Mod: GO | Performed by: OCCUPATIONAL THERAPIST

## 2025-01-21 PROCEDURE — 97110 THERAPEUTIC EXERCISES: CPT | Mod: GO | Performed by: OCCUPATIONAL THERAPIST

## 2025-01-21 ASSESSMENT — PAIN SCALES - GENERAL: PAINLEVEL_OUTOF10: 5 - MODERATE PAIN

## 2025-01-21 ASSESSMENT — PAIN - FUNCTIONAL ASSESSMENT: PAIN_FUNCTIONAL_ASSESSMENT: 0-10

## 2025-01-21 NOTE — PROGRESS NOTES
"Occupational Therapy   Treatment/Re measurement  Patient Name: Sue Schafer  MRN: [id number]  OT Received on: 1/21/2025     Insurance:     Time:  Time Calculation  Start Time: 0806  Stop Time: 0848  Time Calculation (min): 42 min  OT Therapeutic Procedures Time Entry  Manual Therapy Time Entry: 12  Therapeutic Activity Time Entry: 15  Therapeutic Exercise Time Entry: 15    Insurance:  Visit number: 9 of 12  Authorization info: OT AUTH FOR 2025 RECEIVED FOR 10 VISITS FOR 01065, 23384, 94454, 85166 FROM 1/1/25 TO 2/28/25  Insurance Type: Payor:  EMPLOYEE [employer] PLAN / Plan:  EMPLOYEE MEDICAL PLAN CONSUMER [employer] / Product Type: *No Product type* /     Subjective   Problem List Items Addressed This Visit             ICD-10-CM       Musculoskeletal and Injuries    Lateral epicondylitis, right elbow M77.11     Referred by: Dr. Robin, next follow up 1/23/25  Patient reports \"pain about 5/10  right elbow\".  Pt using heat, ice PRN at work. Tylenol for pain PRN . Heat   Patient wearing compression sleeve at work. Goals and precautions reviewed with patient. She verbalized understanding. Pt is taking a biologic once a week for inflammation.  (Dr. Robin this date/side note  currently with DR patiño)    Performing HEP?: Yes    Precautions:  UE Weight Bearing Status: Weight Bearing as Tolerated  Pain:  Pain Assessment  Pain Assessment: 0-10  0-10 (Numeric) Pain Score: 5 - Moderate pain  Pain Location: Elbow  Pain Orientation: Right  Pain Radiating Towards: 2/10 in the morning but pain gets worse    Objective      Scar hard in some areas.  Scar massage completed by writer and Reviewed and Pt  is completing scar massage daily. Less hypersensitivity noted  Edema: continued moderate throughout FA. Elbow and wrist  Sensory: impaired   strength:  (level II, average 3 trials)  Right 10 lbs.   Left 55 lbs.;  Pinch strength:  Key, 3 jaw real,   Right 8, 9 , lbs.   Left 15, 14, lbs.   AROM R elbow -10/135 " "degrees  Strength elbow 4/5 MMT elbow flex; 4+/5 elbow extension; wrist ext/flex 4/5 MMT  Numbness/Tingling: continues to be noted \"once in a while\" in small finger intermittently and radiating up to FA    Treatment:  Modalities:  NA recommending use at home  Therapeutic Exercise:  Reviewed Long hold  over elevated surface x 3 min for bicep ext at 1#resistance.  To progress to 2#  Instructed with doorway bicep stretching completed with manual cues x 3 reps low and high, no pain reported  Completed pulleys x 3 mins bilateral; pull downs 3, 4 lbs pull downs 3 minutes  UBE x 6 min level 4.0 RPM 40-50  Continue theraputty to medium resistance orange. To complete 1 set of 10 reps each for gross grasp,progress to  individual pinch  Completed median nerve glides 3 x to continue as part of HEP as tolerated.   Completed 10 reps flex bar in supination pronation  Reviewed low resistance ER/IR HEP 2 x 5 reps with 5 sec hold, to increase to 2 sets of 15 reps  increase to 2 sets of 10 reps     Post-tx pain:  5 /10, no change     Assessment/Plan Pt reports  injury and is more motivated to return to full function. Continues to be limited by pain; discussed pain control and management. nerve pain, tingling  and edema. Pt to follow through with use of ice and heat along with edema control strategies, elbow AROM while supine and added resistance  putty , light theraband ER and IR and low resistance foam. Next visit progress to higher resistance theraband for IR/ER.   Education  Individual(s) Educated: Patient  Education Provided: Diagnosis & Precautions, Symptom management, POC discussed and agreed upon, Wound care, Risk and benefits of OT discussed with patient or other  Home Program: AROM, Activity modification, Modalities, Orthotic wearing schedule, care and precautions, Wound/scar care, Handout issued  Diagnosis and Precautions: pain and weakness right  and wrist weakness  Equipment: Thera-putty (orange)  Risk and " Benefits Discussed with Patient/Caregiver/Other: yes  Patient/Caregiver Demonstrated Understanding: yes  Plan of Care Discussed and Agreed Upon: yes  Patient Response to Education: Patient/Caregiver Verbalized Understanding of Information, Patient/Caregiver Performed Return Demonstration of Exercises/Activities, Patient/Caregiver Asked Appropriate Questions    Goals:  Active       OT Problem       Patient will demo pain free  of 45 lbs with extended elbow for right UE bathing grooming and carrying groceries. (Progressing)       Start:  11/12/24    Expected End:  02/10/25            Pt will demo full right elbow ROM 0/135 degrees, wrist and forearm with good strength 5/5 MMT for carrying laundry and riding cycle. (Progressing)       Start:  11/12/24    Expected End:  02/10/25            PATIENT WILL SHOW A SIGNIFICANT CHANGE IN UEFI PATIENT REPORTED OUTCOME TOOL TO DEMONSTRATE SUBJECTIVE IMPROVEMENT (Progressing)       Start:  11/12/24    Expected End:  02/10/25            PATIENT WILL DEMONSTRATE INDEPENDENCE IN HOME PROGRAM FOR SUPPORT OF PROGRESSION (Progressing)       Start:  11/12/24    Expected End:  02/10/25            PATIENT WILL REPORT PAIN OF 0-2/10 DEMONSTRATING A REDUCTION OF OVERALL PAIN (Progressing)       Start:  11/12/24    Expected End:  02/10/25

## 2025-02-08 PROCEDURE — RXMED WILLOW AMBULATORY MEDICATION CHARGE

## 2025-02-11 LAB
25(OH)D3+25(OH)D2 SERPL-MCNC: 26 NG/ML (ref 30–100)
ALBUMIN SERPL-MCNC: 4.3 G/DL (ref 3.6–5.1)
ALP SERPL-CCNC: 95 U/L (ref 37–153)
ALT SERPL-CCNC: 17 U/L (ref 6–29)
ANION GAP SERPL CALCULATED.4IONS-SCNC: 9 MMOL/L (CALC) (ref 7–17)
AST SERPL-CCNC: 17 U/L (ref 10–35)
BASOPHILS # BLD AUTO: 61 CELLS/UL (ref 0–200)
BASOPHILS NFR BLD AUTO: 0.6 %
BILIRUB SERPL-MCNC: 0.3 MG/DL (ref 0.2–1.2)
BUN SERPL-MCNC: 13 MG/DL (ref 7–25)
C1Q SERPL-MCNC: NORMAL UG/ML
C3 SERPL-MCNC: 160 MG/DL (ref 83–193)
C4 SERPL-MCNC: 18 MG/DL (ref 15–57)
CALCIUM SERPL-MCNC: 8.9 MG/DL (ref 8.6–10.4)
CHLORIDE SERPL-SCNC: 103 MMOL/L (ref 98–110)
CK SERPL-CCNC: 51 U/L (ref 21–240)
CO2 SERPL-SCNC: 28 MMOL/L (ref 20–32)
CREAT SERPL-MCNC: 0.66 MG/DL (ref 0.5–1.03)
CRP SERPL-MCNC: 8 MG/L
DSDNA AB SER-ACNC: <1 IU/ML
EGFRCR SERPLBLD CKD-EPI 2021: 104 ML/MIN/1.73M2
EOSINOPHIL # BLD AUTO: 232 CELLS/UL (ref 15–500)
EOSINOPHIL NFR BLD AUTO: 2.3 %
ERYTHROCYTE [DISTWIDTH] IN BLOOD BY AUTOMATED COUNT: 12.1 % (ref 11–15)
ERYTHROCYTE [SEDIMENTATION RATE] IN BLOOD BY WESTERGREN METHOD: 25 MM/H
GLUCOSE SERPL-MCNC: 138 MG/DL (ref 65–99)
HCT VFR BLD AUTO: 43.1 % (ref 35–45)
HGB BLD-MCNC: 13.9 G/DL (ref 11.7–15.5)
LYMPHOCYTES # BLD AUTO: 3242 CELLS/UL (ref 850–3900)
LYMPHOCYTES NFR BLD AUTO: 32.1 %
MCH RBC QN AUTO: 28.7 PG (ref 27–33)
MCHC RBC AUTO-ENTMCNC: 32.3 G/DL (ref 32–36)
MCV RBC AUTO: 89 FL (ref 80–100)
MONOCYTES # BLD AUTO: 616 CELLS/UL (ref 200–950)
MONOCYTES NFR BLD AUTO: 6.1 %
NEUTROPHILS # BLD AUTO: 5949 CELLS/UL (ref 1500–7800)
NEUTROPHILS NFR BLD AUTO: 58.9 %
PLATELET # BLD AUTO: 326 THOUSAND/UL (ref 140–400)
PMV BLD REES-ECKER: 10.7 FL (ref 7.5–12.5)
POTASSIUM SERPL-SCNC: 4.4 MMOL/L (ref 3.5–5.3)
PROT SERPL-MCNC: 7 G/DL (ref 6.1–8.1)
RBC # BLD AUTO: 4.84 MILLION/UL (ref 3.8–5.1)
SODIUM SERPL-SCNC: 140 MMOL/L (ref 135–146)
WBC # BLD AUTO: 10.1 THOUSAND/UL (ref 3.8–10.8)

## 2025-02-13 ENCOUNTER — OFFICE VISIT (OUTPATIENT)
Dept: RHEUMATOLOGY | Facility: CLINIC | Age: 55
End: 2025-02-13
Payer: COMMERCIAL

## 2025-02-13 VITALS
HEIGHT: 65 IN | OXYGEN SATURATION: 98 % | HEART RATE: 84 BPM | RESPIRATION RATE: 17 BRPM | SYSTOLIC BLOOD PRESSURE: 125 MMHG | BODY MASS INDEX: 39.32 KG/M2 | WEIGHT: 236 LBS | TEMPERATURE: 96.8 F | DIASTOLIC BLOOD PRESSURE: 71 MMHG

## 2025-02-13 DIAGNOSIS — E55.9 VITAMIN D DEFICIENCY: ICD-10-CM

## 2025-02-13 DIAGNOSIS — M77.11 LATERAL EPICONDYLITIS OF RIGHT ELBOW: ICD-10-CM

## 2025-02-13 DIAGNOSIS — R76.8 POSITIVE ANA (ANTINUCLEAR ANTIBODY): ICD-10-CM

## 2025-02-13 DIAGNOSIS — Z79.899 ENCOUNTER FOR LONG-TERM (CURRENT) USE OF MEDICATIONS: ICD-10-CM

## 2025-02-13 DIAGNOSIS — M05.79 SEROPOSITIVE RHEUMATOID ARTHRITIS OF MULTIPLE SITES (MULTI): Primary | ICD-10-CM

## 2025-02-13 DIAGNOSIS — L40.0 PLAQUE PSORIASIS: ICD-10-CM

## 2025-02-13 PROCEDURE — 3008F BODY MASS INDEX DOCD: CPT | Performed by: INTERNAL MEDICINE

## 2025-02-13 PROCEDURE — 99213 OFFICE O/P EST LOW 20 MIN: CPT | Mod: 25 | Performed by: INTERNAL MEDICINE

## 2025-02-13 PROCEDURE — 99213 OFFICE O/P EST LOW 20 MIN: CPT | Performed by: INTERNAL MEDICINE

## 2025-02-13 PROCEDURE — RXMED WILLOW AMBULATORY MEDICATION CHARGE

## 2025-02-13 RX ORDER — KETOROLAC TROMETHAMINE 60 MG/2ML
30 INJECTION, SOLUTION INTRAMUSCULAR
Qty: 2 ML | Refills: 3 | Status: SHIPPED | OUTPATIENT
Start: 2025-02-13

## 2025-02-13 ASSESSMENT — ROUTINE ASSESSMENT OF PATIENT INDEX DATA (RAPID3)
WALK_KILOMETERS: WITH MUCH DIFFICULTY
ON A SCALE OF ONE TO TEN, HOW MUCH PAIN HAVE YOU HAD BECAUSE OF YOUR CONDITION OVER THE PAST WEEK?: 6.5
ON A SCALE OF ONE TO TEN, CONSIDERING ALL THE WAYS IN WHICH ILLNESS AND HEALTH CONDITIONS MAY AFFECT YOU AT THIS TIME, PLEASE INDICATE BELOW HOW YOU ARE DOING:: 3.5
WEIGHTED_TOTAL_SCORE: 4.57
SUM OF QUESTIONS A TO J: 11
IN_OUT_TRANSPORT: WITH MUCH DIFFICULTY
PICK_CLOTHES_OFF_FLOOR: WITH SOME DIFFICULTY
FEELINGS_DEPRESSION: WITHOUT ANY DIFFICULTY
GOOD_NIGHTS_SLEEP: UNABLE TO DO
IN_OUT_BED: WITHOUT ANY DIFFICULTY
ON A SCALE OF ONE TO TEN, CONSIDERING ALL THE WAYS IN WHICH ILLNESS AND HEALTH CONDITIONS MAY AFFECT YOU AT THIS TIME, PLEASE INDICATE BELOW HOW YOU ARE DOING:: 3.5
TOTAL RAPID3 SCORE: 13.7
TURN_FAUCETS_OFF: WITH SOME DIFFICULTY
SEVERITY_SCORE: 0
FN_SCORE: 3.7
PARTIPATE_RECREATIONAL_ACTIVITIES: WITH MUCH DIFFICULTY
FEELINGS_ANXIETY_NERVOUS: WITHOUT ANY DIFFICULTY
DRESS_YOURSELF: WITH SOME DIFFICULTY
ON A SCALE OF ONE TO TEN, HOW MUCH PAIN HAVE YOU HAD BECAUSE OF YOUR CONDITION OVER THE PAST WEEK?: 6.5
LIFT_CUP_TO_MOUTH: WITH SOME DIFFICULTY
WASH_DRY_BODY: WITHOUT ANY DIFFICULTY
SEVERITY_SCORE: HIGH SEVERITY (HS)
WALK_FLAT_GROUND: WITH SOME DIFFICULTY

## 2025-02-13 ASSESSMENT — PAIN SCALES - GENERAL: PAINLEVEL_OUTOF10: 6

## 2025-02-13 NOTE — PROGRESS NOTES
[facility]/  Rheumatology  [address], [address]  [place] [address]  Phone: [phone]  Fax: [phone]    Rheumatology Progress Note 02/13/2025      Sue Schafer is a 54 y.o. female here for   Chief Complaint   Patient presents with    Follow-up       Last Visit: 5/30/24    Rheum Hx      Referred by self for knee pain.  Chief complaint: Swollen hands, feet, legs, stiffness  Since February 2019  Slow onset  Intermittent remitting course  Precipitating factors: Fallen eyes  No associated symptoms  Better: Lately nothing  Worse: Walking sitting  Previous treatments:  Naproxen-somewhat helpful  Ibuprofen-somewhat helpful  Acetaminophen-somewhat helpful  Steroids-very helpful- Medrol, no side effects  Occupation: Medical assistant  Hx of fall on ice/knee trauma x21st in 2/2017, then 2/2019  Sometimes locks ups, hand locking at times  Bilateral shoulder, right hip, bilateral knees, bilateral ankle  pain  Swelling of right 5th digit and right leg  Morning stiffness until after hot shower  Hx of sciatica with pregnancy  Review of systems positive for:  Hair loss  MARIE sometimes; tightness more than SOB; last wk R  back/flank discomfort' Dx asthma in past- on inh prn with help  Lower extremity swelling by the end of the day  Sensitivity to the sun- itchy, red, swollen spots  Joint pain swelling stiffness  Weakness hands  Numbness tingling legs  Allergies, seasonal- Zyrtec helps  FHx: cousins x3- SLE  psoriasis- sister  PMR/FMS- sister  rosacea- mother  CVA/MS/melanoma- father   1/4/2022   C-ANCA (PR3) BY EIA <0.2…    C-ANCA FLOURESCENCE Negative…    P-ANCA (MPO) BY EIA <0.2…    P-ANCA FLOURESCENCE Negative…    KRISTAL Positive…    KRISTAL Titer 1:320…    KRISTAL Pattern Nucleolar…    SSA ANTIBODIES <0.2…    SSB ANTIBODIES <0.2…    Angiotensin 1 Conv 30…    Thyroid Peroxidase (TPO) Antibody <1.0…    Citrulline Antibody, IgG 31… (H)    Centromere Ab Negative…    BULMARO-1 ANTIBODY <0.2…    RNP  Antibody <0.2…         Previous Tx  Previous treatments:  Naproxen-somewhat helpful  Ibuprofen-somewhat helpful  Acetaminophen-somewhat helpful  Meloxicam- discontinued due to swelling.  Steroids-very helpful- Medrol, no side effects  Toradol- very helpful  Otezla- currently on  Orencia- currently on  Tremfya-     Health Maintenance  DXA- none  Malignancy Hx- none  Component      Latest Ref Rng 10/27/2021   TB Result Negative…    Hepatitis B Surface AB POSITIVE…      Component      Latest Ref Rng 1/4/2022   HIV 1/2 Antigen/Antibody Screen with Reflex to Confirmation Non Reactive…    Hiv Interpretation Negative…    Hepatitis B Surface AG      NEG  NEGATIVE      Immunization History   Administered Date(s) Administered    Flu vaccine, trivalent, preservative free, HIGH-DOSE, age 65y+ (Fluzone) 11/08/2024    Influenza, trivalent, adjuvanted 04/01/2022, 10/28/2022    Annmarie SARS-CoV-2 Vaccination 04/01/2022          Past Medical History:   Diagnosis Date    ADHD (attention deficit hyperactivity disorder)     Allergic     Arthritis     Asthma     Chronic pain disorder     Dermatitis     GERD (gastroesophageal reflux disease)     Lower extremity edema     Plaque psoriasis     Positive KRISTAL (antinuclear antibody)     Seropositive rheumatoid arthritis of multiple sites (Multi)       Past Surgical History:   Procedure Laterality Date    BLADDER SUSPENSION      BONE BIOPSY  1996    JAW    BREAST BIOPSY Bilateral     needle biopsies in separate times    HYSTERECTOMY  01/21/2014    Hysterectomy    WISDOM TOOTH EXTRACTION  1996      Current Outpatient Medications   Medication Sig Dispense Refill    abatacept (Orencia ClickJect) 125 mg/mL auto-injector auto-injection Inject 1 mL (125 mg) under the skin 1 (one) time per week. 4 mL 11    albuterol (Ventolin HFA) 90 mcg/actuation inhaler Inhale 2 puffs every 4 hours if needed for wheezing or shortness of breath. 18 g 3    amphetamine-dextroamphetamine XR (Adderall XR) 20 mg 24 hr  capsule Take 1 capsule (20 mg) by mouth once daily in the morning. Do not crush or chew. 30 capsule 0    [START ON 3/3/2025] amphetamine-dextroamphetamine XR (Adderall XR) 20 mg 24 hr capsule Take 1 capsule (20 mg) by mouth once daily in the morning. Do not crush or chew. Do not fill before March 3, 2025. 30 capsule 0    apremilast (Otezla) 30 mg tablet TAKE ONE (1) TABLET BY MOUTH TWICE DAILY 60 tablet 11    cetirizine (ZyrTEC) 10 mg capsule 2 times a day.      cyclobenzaprine (Flexeril) 10 mg tablet Take 1 tablet (10 mg) by mouth. PRN      ergocalciferol (Vitamin D-2) 1.25 MG (75563 UT) capsule Take 1 capsule (50,000 Units) by mouth 2 times a week. 1 capsule po weekly for 90 days 24 capsule 3    fluconazole (Diflucan) 150 mg tablet Monday, Wednesday, Friday as needed for yeast infection. 12 tablet 1    folic acid (Folvite) 1 mg tablet 1 tablet Orally Once a day for 90 days      furosemide (Lasix) 20 mg tablet Take 1 tablet (20 mg) by mouth 3 times a day as needed (swelling). 90 tablet 3    hydroxychloroquine (PlaqueniL) 200 mg tablet 1 tablet Orally twice daily for 90 days 180 tablet 3    hydrOXYzine HCL (Atarax) 25 mg tablet Take 1 tablet (25 mg) by mouth every 8 hours if needed for itching. 90 tablet 3    lidocaine (Lidoderm) 5 % patch Place 1 patch over 12 hours on the skin once daily. Remove & discard patch within 12 hours or as directed by MD. 30 patch 11    nystatin (Mycostatin) 100,000 unit/gram powder Apply 1 Application topically 2 times a day. 60 g 1    potassium chloride ER (Micro-K) 10 mEq ER capsule 1 capsule (10 mEq). Takes with water pill      Adderall XR 20 mg 24 hr capsule Take 1 capsule (20 mg) by mouth once daily in the morning. Do not crush or chew. 30 capsule 0    amphetamine-dextroamphetamine XR (Adderall XR) 20 mg 24 hr capsule Take 1 capsule (20 mg) by mouth once daily in the morning. Do not crush or chew. 30 capsule 0    amphetamine-dextroamphetamine XR (Adderall XR) 20 mg 24 hr capsule  "Take 1 capsule (20 mg) by mouth once daily in the morning. Do not crush or chew. 30 capsule 0    omeprazole (PriLOSEC) 20 mg DR capsule Take 1 capsule (20 mg) by mouth once daily. Do not crush or chew. 30 capsule 11     Current Facility-Administered Medications   Medication Dose Route Frequency Provider Last Rate Last Admin    BUPivacaine HCl (Marcaine) 0.5 % (5 mg/mL) injection 10 mg  2 mL injection Once Kody Patel MD        BUPivacaine HCl (Marcaine) 0.5 % (5 mg/mL) injection 15 mg  3 mL injection Once Kody Patel MD        methylPREDNISolone acetate (DEPO-Medrol) injection 30 mg  30 mg intramuscular Once Kody Patel MD        methylPREDNISolone acetate (DEPO-Medrol) injection 40 mg  40 mg intramuscular Once Kody Patel MD        methylPREDNISolone acetate (DEPO-Medrol) injection 80 mg  80 mg intramuscular Once Allison Cali Valentine MD        methylPREDNISolone acetate (DEPO-Medrol) injection 80 mg  80 mg intramuscular Once Allison Cali Valentine MD          Allergies   Allergen Reactions    Other Rash     Rash from Adhesive from Band-Aid      Visit Vitals  /71 (BP Location: Right arm, Patient Position: Sitting, BP Cuff Size: Large adult)   Pulse 84   Temp 36 °C (96.8 °F) (Temporal)   Resp 17   Ht 1.651 m (5' 5\")   Wt 107 kg (236 lb)   SpO2 98%   BMI 39.27 kg/m²   OB Status Hysterectomy   Smoking Status Never   BSA 2.22 m²            Rapid 3  Function Score (FN): 3.7  Pain Score (PN) (0-10): 6.5  Patient Global (PTGL) (0-10): 3.5  Rapid3 Score: 13.7  RAPID3 Weighted Score: 4.57     Workup    Component      Latest Ref Rng 2/10/2025   WHITE BLOOD CELL COUNT      3.8 - 10.8 Thousand/uL 10.1    RED BLOOD CELL COUNT      3.80 - 5.10 Million/uL 4.84    HEMOGLOBIN      11.7 - 15.5 g/dL 13.9    HEMATOCRIT      35.0 - 45.0 % 43.1    MCV      80.0 - 100.0 fL 89.0    MCH      27.0 - 33.0 pg 28.7    MCHC      32.0 - 36.0 g/dL 32.3    RDW      11.0 - 15.0 % 12.1    PLATELET COUNT      140 - 400 Thousand/uL " 326    MPV      7.5 - 12.5 fL 10.7    ABSOLUTE NEUTROPHILS      1,500 - 7,800 cells/uL 5,949    ABSOLUTE LYMPHOCYTES      850 - 3,900 cells/uL 3,242    ABSOLUTE MONOCYTES      200 - 950 cells/uL 616    ABSOLUTE EOSINOPHILS      15 - 500 cells/uL 232    ABSOLUTE BASOPHILS      0 - 200 cells/uL 61    NEUTROPHILS      % 58.9    LYMPHOCYTES      % 32.1    MONOCYTES      % 6.1    EOSINOPHILS      % 2.3    BASOPHILS      % 0.6    GLUCOSE      65 - 99 mg/dL 138 (H)    UREA NITROGEN (BUN)      7 - 25 mg/dL 13    CREATININE      0.50 - 1.03 mg/dL 0.66    EGFR      > OR = 60 mL/min/1.73m2 104    SODIUM      135 - 146 mmol/L 140    POTASSIUM      3.5 - 5.3 mmol/L 4.4    CHLORIDE      98 - 110 mmol/L 103    CARBON DIOXIDE      20 - 32 mmol/L 28    ELECTROLYTE BALANCE      7 - 17 mmol/L (calc) 9    CALCIUM      8.6 - 10.4 mg/dL 8.9    PROTEIN, TOTAL      6.1 - 8.1 g/dL 7.0    ALBUMIN      3.6 - 5.1 g/dL 4.3    BILIRUBIN, TOTAL      0.2 - 1.2 mg/dL 0.3    ALKALINE PHOSPHATASE      37 - 153 U/L 95    AST      10 - 35 U/L 17    ALT      6 - 29 U/L 17    CREATINE KINASE, TOTAL      21 - 240 U/L 51    SED RATE BY MODIFIED WESTERGREN      < OR = 30 mm/h 25    DNA (DS) ANTIBODY      IU/mL <1    COMPLEMENT COMPONENT C3C      83 - 193 mg/dL 160    COMPLEMENT COMPONENT C4C      15 - 57 mg/dL 18    C-REACTIVE PROTEIN      <8.0 mg/L 8.0 (H)    VITAMIN D,25-OH,TOTAL,IA      30 - 100 ng/mL 26 (L)         Assessment/Plan  1. Seropositive rheumatoid arthritis of multiple sites (Multi)    2. Plaque psoriasis    3. Positive KRISTAL (antinuclear antibody)    4. Lateral epicondylitis of right elbow    5. Encounter for long-term (current) use of medications    6. Vitamin D deficiency           No orders of the defined types were placed in this encounter.         Since last appt, adherent and tolerating Orencia 125 mg weekly,  mg daily, and Otezla 30 mg daily.  Fingers DIPs, elbows, toes with L 2nd toe deformities  AM stiffness at least 60 min.    Difficulty raising arms  Ibuprofen and Tylenol  Second biopsy from leg showed porokeratosis  Lac Hydrin did not work  Denies any recent or current infection.  Not on any glucocorticoids.  ROS+ for fatigue, blurry vision, swollen glands/nodes, MARIE, sun sens, joint pain/swelling/stiffness, numbness/tingling,   Advised on exercise, recommend quad strengthening.  Rapid 3 consistent with high severity.  Labs reviewed  D/w pt tx options   Switch to Orencia infusion due to lack of full efficacy with current regimen.  dizziness spells positional. ?vertigo, post recurrent ear infections and on immunosuppression- recommend ENT eval- ordered.  Podiatry eval  XR knees  Continue Otezla which has been helpful.  Continue HCQ  DXA  Advised of possible side effects and importance of monitoring.   All questions answered.  Patient to follow up with primary care provider regarding all other medical issues not addressed today and for medical chart updating.         Since last appt, adherent and tolerating regimen.  Recent fall with knee trauma.  Ibuprofen takes edge off.   Told to build muscle by Ortho now.   Denies any recent or current infection.  Not on any glucocorticoids.  ROS+ for   Rapid 3 consistent with high severity.  Labs reviewed  D/w pt tx options and decided on ***  Orencia on call    Advised of possible side effects and importance of monitoring.   All questions answered.  Patient to follow up with primary care provider regarding all other medical issues not addressed today and for medical chart updating.     Allison Valentine MD      Patient Care Team:  Luis Carlos Mason DO as PCP - General (Family Medicine)  Luis Carlos Mason DO as PCP - Employee ACO PCP  Allison Valentine MD as Consulting Physician (Rheumatology)       reviewed  D/w pt tx options and decided on would benefit from higher dose Orencia to aim for remission and decrease NSAID.  Orencia on call  Cont rest regimen  Replete vit D  Advised of possible side effects and importance of monitoring.   All questions answered.  Patient to follow up with primary care provider regarding all other medical issues not addressed today and for medical chart updating.     Allison Valentine MD      Patient Care Team:  Luis Carlos Mason DO as PCP - General (Family Medicine)  Luis Carlos Mason DO as PCP - Employee [employer]CO PCP  Allison Valentine MD as Consulting Physician (Rheumatology)

## 2025-02-16 ENCOUNTER — PHARMACY VISIT (OUTPATIENT)
Dept: PHARMACY | Facility: CLINIC | Age: 55
End: 2025-02-16
Payer: COMMERCIAL

## 2025-02-16 ENCOUNTER — SPECIALTY PHARMACY (OUTPATIENT)
Dept: PHARMACY | Facility: CLINIC | Age: 55
End: 2025-02-16

## 2025-02-16 PROCEDURE — RXMED WILLOW AMBULATORY MEDICATION CHARGE

## 2025-02-20 ENCOUNTER — SPECIALTY PHARMACY (OUTPATIENT)
Dept: PHARMACY | Facility: CLINIC | Age: 55
End: 2025-02-20

## 2025-02-20 LAB
25(OH)D3+25(OH)D2 SERPL-MCNC: 26 NG/ML (ref 30–100)
ALBUMIN SERPL-MCNC: 4.3 G/DL (ref 3.6–5.1)
ALP SERPL-CCNC: 95 U/L (ref 37–153)
ALT SERPL-CCNC: 17 U/L (ref 6–29)
ANION GAP SERPL CALCULATED.4IONS-SCNC: 9 MMOL/L (CALC) (ref 7–17)
AST SERPL-CCNC: 17 U/L (ref 10–35)
BASOPHILS # BLD AUTO: 61 CELLS/UL (ref 0–200)
BASOPHILS NFR BLD AUTO: 0.6 %
BILIRUB SERPL-MCNC: 0.3 MG/DL (ref 0.2–1.2)
BUN SERPL-MCNC: 13 MG/DL (ref 7–25)
C1Q SERPL-MCNC: 8.5 MG/DL (ref 5–8.6)
C3 SERPL-MCNC: 160 MG/DL (ref 83–193)
C4 SERPL-MCNC: 18 MG/DL (ref 15–57)
CALCIUM SERPL-MCNC: 8.9 MG/DL (ref 8.6–10.4)
CHLORIDE SERPL-SCNC: 103 MMOL/L (ref 98–110)
CK SERPL-CCNC: 51 U/L (ref 21–240)
CO2 SERPL-SCNC: 28 MMOL/L (ref 20–32)
CREAT SERPL-MCNC: 0.66 MG/DL (ref 0.5–1.03)
CRP SERPL-MCNC: 8 MG/L
DSDNA AB SER-ACNC: <1 IU/ML
EGFRCR SERPLBLD CKD-EPI 2021: 104 ML/MIN/1.73M2
EOSINOPHIL # BLD AUTO: 232 CELLS/UL (ref 15–500)
EOSINOPHIL NFR BLD AUTO: 2.3 %
ERYTHROCYTE [DISTWIDTH] IN BLOOD BY AUTOMATED COUNT: 12.1 % (ref 11–15)
ERYTHROCYTE [SEDIMENTATION RATE] IN BLOOD BY WESTERGREN METHOD: 25 MM/H
GLUCOSE SERPL-MCNC: 138 MG/DL (ref 65–99)
HCT VFR BLD AUTO: 43.1 % (ref 35–45)
HGB BLD-MCNC: 13.9 G/DL (ref 11.7–15.5)
LYMPHOCYTES # BLD AUTO: 3242 CELLS/UL (ref 850–3900)
LYMPHOCYTES NFR BLD AUTO: 32.1 %
MCH RBC QN AUTO: 28.7 PG (ref 27–33)
MCHC RBC AUTO-ENTMCNC: 32.3 G/DL (ref 32–36)
MCV RBC AUTO: 89 FL (ref 80–100)
MONOCYTES # BLD AUTO: 616 CELLS/UL (ref 200–950)
MONOCYTES NFR BLD AUTO: 6.1 %
NEUTROPHILS # BLD AUTO: 5949 CELLS/UL (ref 1500–7800)
NEUTROPHILS NFR BLD AUTO: 58.9 %
PLATELET # BLD AUTO: 326 THOUSAND/UL (ref 140–400)
PMV BLD REES-ECKER: 10.7 FL (ref 7.5–12.5)
POTASSIUM SERPL-SCNC: 4.4 MMOL/L (ref 3.5–5.3)
PROT SERPL-MCNC: 7 G/DL (ref 6.1–8.1)
RBC # BLD AUTO: 4.84 MILLION/UL (ref 3.8–5.1)
SODIUM SERPL-SCNC: 140 MMOL/L (ref 135–146)
WBC # BLD AUTO: 10.1 THOUSAND/UL (ref 3.8–10.8)

## 2025-02-22 ENCOUNTER — PHARMACY VISIT (OUTPATIENT)
Dept: PHARMACY | Facility: CLINIC | Age: 55
End: 2025-02-22
Payer: COMMERCIAL

## 2025-03-11 DIAGNOSIS — R41.840 ATTENTION AND CONCENTRATION DEFICIT: ICD-10-CM

## 2025-03-11 PROCEDURE — RXMED WILLOW AMBULATORY MEDICATION CHARGE

## 2025-03-11 RX ORDER — DEXTROAMPHETAMINE SACCHARATE, AMPHETAMINE ASPARTATE MONOHYDRATE, DEXTROAMPHETAMINE SULFATE AND AMPHETAMINE SULFATE 5; 5; 5; 5 MG/1; MG/1; MG/1; MG/1
20 CAPSULE, EXTENDED RELEASE ORAL EVERY MORNING
Qty: 30 CAPSULE | Refills: 0 | Status: SHIPPED | OUTPATIENT
Start: 2025-03-11 | End: 2025-04-11

## 2025-03-11 RX ORDER — DEXTROAMPHETAMINE SACCHARATE, AMPHETAMINE ASPARTATE MONOHYDRATE, DEXTROAMPHETAMINE SULFATE AND AMPHETAMINE SULFATE 5; 5; 5; 5 MG/1; MG/1; MG/1; MG/1
20 CAPSULE, EXTENDED RELEASE ORAL EVERY MORNING
Qty: 30 CAPSULE | Refills: 0 | Status: CANCELLED | OUTPATIENT
Start: 2025-03-11 | End: 2025-04-10

## 2025-03-11 NOTE — TELEPHONE ENCOUNTER
OARRS reviewed with no suspect activity.  Prescription refill sent for the next month.  Follow-up would be recommended prior to next refill

## 2025-03-17 DIAGNOSIS — M17.10 KNEE ARTHROPATHY: Primary | ICD-10-CM

## 2025-03-18 ENCOUNTER — HOSPITAL ENCOUNTER (OUTPATIENT)
Dept: RADIOLOGY | Facility: CLINIC | Age: 55
Discharge: HOME | End: 2025-03-18
Payer: COMMERCIAL

## 2025-03-18 ENCOUNTER — SPECIALTY PHARMACY (OUTPATIENT)
Dept: PHARMACY | Facility: CLINIC | Age: 55
End: 2025-03-18

## 2025-03-18 ENCOUNTER — APPOINTMENT (OUTPATIENT)
Dept: RHEUMATOLOGY | Facility: CLINIC | Age: 55
End: 2025-03-18
Payer: COMMERCIAL

## 2025-03-18 ENCOUNTER — PHARMACY VISIT (OUTPATIENT)
Dept: PHARMACY | Facility: CLINIC | Age: 55
End: 2025-03-18
Payer: COMMERCIAL

## 2025-03-18 DIAGNOSIS — M17.10 KNEE ARTHROPATHY: ICD-10-CM

## 2025-03-18 PROCEDURE — 73562 X-RAY EXAM OF KNEE 3: CPT | Mod: RIGHT SIDE | Performed by: RADIOLOGY

## 2025-03-18 PROCEDURE — 73562 X-RAY EXAM OF KNEE 3: CPT | Mod: RT

## 2025-03-24 ENCOUNTER — SPECIALTY PHARMACY (OUTPATIENT)
Dept: PHARMACY | Facility: CLINIC | Age: 55
End: 2025-03-24

## 2025-03-24 PROCEDURE — RXMED WILLOW AMBULATORY MEDICATION CHARGE

## 2025-03-25 ENCOUNTER — PHARMACY VISIT (OUTPATIENT)
Dept: PHARMACY | Facility: CLINIC | Age: 55
End: 2025-03-25
Payer: COMMERCIAL

## 2025-04-18 ENCOUNTER — SPECIALTY PHARMACY (OUTPATIENT)
Dept: PHARMACY | Facility: CLINIC | Age: 55
End: 2025-04-18

## 2025-04-18 PROCEDURE — RXMED WILLOW AMBULATORY MEDICATION CHARGE

## 2025-04-21 ENCOUNTER — PHARMACY VISIT (OUTPATIENT)
Dept: PHARMACY | Facility: CLINIC | Age: 55
End: 2025-04-21
Payer: COMMERCIAL

## 2025-04-26 DIAGNOSIS — B99.9 INFECTION: Primary | ICD-10-CM

## 2025-04-26 RX ORDER — AMOXICILLIN AND CLAVULANATE POTASSIUM 875; 125 MG/1; MG/1
875 TABLET, FILM COATED ORAL 2 TIMES DAILY
Qty: 28 TABLET | Refills: 0 | Status: SHIPPED | OUTPATIENT
Start: 2025-04-26 | End: 2025-05-10

## 2025-05-08 ENCOUNTER — SPECIALTY PHARMACY (OUTPATIENT)
Dept: PHARMACY | Facility: CLINIC | Age: 55
End: 2025-05-08

## 2025-05-08 DIAGNOSIS — L40.0 PLAQUE PSORIASIS: ICD-10-CM

## 2025-05-08 RX ORDER — APREMILAST 30 MG/1
30 TABLET, FILM COATED ORAL 2 TIMES DAILY
Qty: 60 TABLET | Refills: 11 | Status: SHIPPED | OUTPATIENT
Start: 2025-05-08 | End: 2026-05-08

## 2025-05-16 ENCOUNTER — SPECIALTY PHARMACY (OUTPATIENT)
Dept: PHARMACY | Facility: CLINIC | Age: 55
End: 2025-05-16

## 2025-05-16 PROCEDURE — RXMED WILLOW AMBULATORY MEDICATION CHARGE

## 2025-05-20 ENCOUNTER — PHARMACY VISIT (OUTPATIENT)
Dept: PHARMACY | Facility: CLINIC | Age: 55
End: 2025-05-20
Payer: COMMERCIAL

## 2025-05-21 ENCOUNTER — PHARMACY VISIT (OUTPATIENT)
Dept: PHARMACY | Facility: CLINIC | Age: 55
End: 2025-05-21
Payer: COMMERCIAL

## 2025-05-28 ENCOUNTER — TELEPHONE (OUTPATIENT)
Dept: OPHTHALMOLOGY | Facility: CLINIC | Age: 55
End: 2025-05-28
Payer: COMMERCIAL

## 2025-05-28 NOTE — TELEPHONE ENCOUNTER
Pt takes plaq.  Referred by [employer] to get vf.  [employer] will send notes over to have Dr. Lai to review records to let us know how he would like scheduled.

## 2025-05-29 NOTE — TELEPHONE ENCOUNTER
Called patient to notify we still have not received records.  She will reach back out to have them faxed over

## 2025-05-30 DIAGNOSIS — F90.0 ATTENTION DEFICIT HYPERACTIVITY DISORDER, INATTENTIVE TYPE, MILD: ICD-10-CM

## 2025-05-30 DIAGNOSIS — Z13.220 ENCOUNTER FOR SCREENING FOR LIPID DISORDER: ICD-10-CM

## 2025-05-30 DIAGNOSIS — Z13.1 ENCOUNTER FOR SCREENING FOR DIABETES MELLITUS: Primary | ICD-10-CM

## 2025-05-30 PROCEDURE — RXMED WILLOW AMBULATORY MEDICATION CHARGE

## 2025-05-30 RX ORDER — DEXTROAMPHETAMINE SACCHARATE, AMPHETAMINE ASPARTATE MONOHYDRATE, DEXTROAMPHETAMINE SULFATE AND AMPHETAMINE SULFATE 5; 5; 5; 5 MG/1; MG/1; MG/1; MG/1
20 CAPSULE, EXTENDED RELEASE ORAL EVERY MORNING
Qty: 30 CAPSULE | Refills: 0 | Status: SHIPPED | OUTPATIENT
Start: 2025-05-30 | End: 2025-06-29

## 2025-06-10 ENCOUNTER — SPECIALTY PHARMACY (OUTPATIENT)
Dept: PHARMACY | Facility: CLINIC | Age: 55
End: 2025-06-10

## 2025-06-10 PROCEDURE — RXMED WILLOW AMBULATORY MEDICATION CHARGE

## 2025-06-12 DIAGNOSIS — L40.0 PLAQUE PSORIASIS: ICD-10-CM

## 2025-06-12 DIAGNOSIS — M05.79 SEROPOSITIVE RHEUMATOID ARTHRITIS OF MULTIPLE SITES (MULTI): Primary | ICD-10-CM

## 2025-06-12 DIAGNOSIS — L30.9 DERMATITIS: ICD-10-CM

## 2025-06-12 PROCEDURE — RXMED WILLOW AMBULATORY MEDICATION CHARGE

## 2025-06-12 RX ORDER — METHYLPREDNISOLONE ACETATE 80 MG/ML
80 INJECTION, SUSPENSION INTRA-ARTICULAR; INTRALESIONAL; INTRAMUSCULAR; SOFT TISSUE ONCE
Status: SHIPPED | OUTPATIENT
Start: 2025-06-12

## 2025-06-13 ENCOUNTER — OFFICE VISIT (OUTPATIENT)
Facility: CLINIC | Age: 55
End: 2025-06-13
Payer: COMMERCIAL

## 2025-06-13 VITALS
BODY MASS INDEX: 39.99 KG/M2 | HEIGHT: 65 IN | TEMPERATURE: 98.1 F | SYSTOLIC BLOOD PRESSURE: 104 MMHG | WEIGHT: 240 LBS | DIASTOLIC BLOOD PRESSURE: 63 MMHG | OXYGEN SATURATION: 100 % | HEART RATE: 62 BPM

## 2025-06-13 DIAGNOSIS — E11.9 TYPE 2 DIABETES MELLITUS WITHOUT COMPLICATION, UNSPECIFIED WHETHER LONG TERM INSULIN USE: Primary | ICD-10-CM

## 2025-06-13 LAB
CHOLEST SERPL-MCNC: 219 MG/DL
CHOLEST/HDLC SERPL: 4.2 (CALC)
EST. AVERAGE GLUCOSE BLD GHB EST-MCNC: 140 MG/DL
EST. AVERAGE GLUCOSE BLD GHB EST-SCNC: 7.7 MMOL/L
HBA1C MFR BLD: 6.5 %
HDLC SERPL-MCNC: 52 MG/DL
LDLC SERPL CALC-MCNC: 144 MG/DL (CALC)
NONHDLC SERPL-MCNC: 167 MG/DL (CALC)
TRIGL SERPL-MCNC: 113 MG/DL

## 2025-06-13 PROCEDURE — 3008F BODY MASS INDEX DOCD: CPT | Performed by: INTERNAL MEDICINE

## 2025-06-13 PROCEDURE — RXMED WILLOW AMBULATORY MEDICATION CHARGE

## 2025-06-13 PROCEDURE — 3078F DIAST BP <80 MM HG: CPT | Performed by: INTERNAL MEDICINE

## 2025-06-13 PROCEDURE — 3074F SYST BP LT 130 MM HG: CPT | Performed by: INTERNAL MEDICINE

## 2025-06-13 PROCEDURE — 99214 OFFICE O/P EST MOD 30 MIN: CPT | Performed by: INTERNAL MEDICINE

## 2025-06-13 RX ORDER — METFORMIN HYDROCHLORIDE 500 MG/1
500 TABLET ORAL
Qty: 14 TABLET | Refills: 1 | Status: SHIPPED | OUTPATIENT
Start: 2025-06-13 | End: 2026-06-13

## 2025-06-13 ASSESSMENT — ROUTINE ASSESSMENT OF PATIENT INDEX DATA (RAPID3)
FEELINGS_ANXIETY_NERVOUS: WITHOUT ANY DIFFICULTY
ON A SCALE OF ONE TO TEN, CONSIDERING ALL THE WAYS IN WHICH ILLNESS AND HEALTH CONDITIONS MAY AFFECT YOU AT THIS TIME, PLEASE INDICATE BELOW HOW YOU ARE DOING:: 3
TOTAL RAPID3 SCORE: 10.2
PARTIPATE_RECREATIONAL_ACTIVITIES: WITHOUT ANY DIFFICULTY
ON A SCALE OF ONE TO TEN, HOW MUCH PAIN HAVE YOU HAD BECAUSE OF YOUR CONDITION OVER THE PAST WEEK?: 5.5
SEVERITY_SCORE: 0
ON A SCALE OF ONE TO TEN, CONSIDERING ALL THE WAYS IN WHICH ILLNESS AND HEALTH CONDITIONS MAY AFFECT YOU AT THIS TIME, PLEASE INDICATE BELOW HOW YOU ARE DOING:: 3
WALK_KILOMETERS: WITH SOME DIFFICULTY
FN_SCORE: 1.7
WEIGHTED_TOTAL_SCORE: 3.4
FEELINGS_DEPRESSION: WITHOUT ANY DIFFICULTY
DRESS_YOURSELF: WITH SOME DIFFICULTY
GOOD_NIGHTS_SLEEP: WITHOUT ANY DIFFICULTY
WALK_FLAT_GROUND: WITH SOME DIFFICULTY
SEVERITY_SCORE: MODERATE SEVERITY (MS)
PICK_CLOTHES_OFF_FLOOR: WITHOUT ANY DIFFICULTY
WASH_DRY_BODY: WITHOUT ANY DIFFICULTY
SUM OF QUESTIONS A TO J: 5
IN_OUT_BED: WITHOUT ANY DIFFICULTY
LIFT_CUP_TO_MOUTH: WITHOUT ANY DIFFICULTY
IN_OUT_TRANSPORT: WITH SOME DIFFICULTY
ON A SCALE OF ONE TO TEN, HOW MUCH PAIN HAVE YOU HAD BECAUSE OF YOUR CONDITION OVER THE PAST WEEK?: 5.5
TURN_FAUCETS_OFF: WITH SOME DIFFICULTY

## 2025-06-13 ASSESSMENT — PAIN SCALES - GENERAL: PAINLEVEL_OUTOF10: 5

## 2025-06-13 ASSESSMENT — COLUMBIA-SUICIDE SEVERITY RATING SCALE - C-SSRS
1. IN THE PAST MONTH, HAVE YOU WISHED YOU WERE DEAD OR WISHED YOU COULD GO TO SLEEP AND NOT WAKE UP?: NO
6. HAVE YOU EVER DONE ANYTHING, STARTED TO DO ANYTHING, OR PREPARED TO DO ANYTHING TO END YOUR LIFE?: NO
2. HAVE YOU ACTUALLY HAD ANY THOUGHTS OF KILLING YOURSELF?: NO

## 2025-06-13 NOTE — PROGRESS NOTES
[facility]/  Rheumatology  [address], [address]  [place] [address]  Phone: [phone]  Fax: [phone]    Rheumatology Progress Note 06/13/2025      Sue Schafer is a 54 y.o. female here for   Chief Complaint   Patient presents with    Follow-up       Last Visit: 11/8/24    Rheum Hx      Referred by self for knee pain.  Chief complaint: Swollen hands, feet, legs, stiffness  Since February 2019  Slow onset  Intermittent remitting course  Precipitating factors: Fallen eyes  No associated symptoms  Better: Lately nothing  Worse: Walking sitting  Previous treatments:  Naproxen-somewhat helpful  Ibuprofen-somewhat helpful  Acetaminophen-somewhat helpful  Steroids-very helpful- Medrol, no side effects  Occupation: Medical assistant  Hx of fall on ice/knee trauma x21st in 2/2017, then 2/2019  Sometimes locks ups, hand locking at times  Bilateral shoulder, right hip, bilateral knees, bilateral ankle  pain  Swelling of right 5th digit and right leg  Morning stiffness until after hot shower  Hx of sciatica with pregnancy  Review of systems positive for:  Hair loss  MARIE sometimes; tightness more than SOB; last wk R  back/flank discomfort' Dx asthma in past- on inh prn with help  Lower extremity swelling by the end of the day  Sensitivity to the sun- itchy, red, swollen spots  Joint pain swelling stiffness  Weakness hands  Numbness tingling legs  Allergies, seasonal- Zyrtec helps  FHx: cousins x3- SLE  psoriasis- sister  PMR/FMS- sister  rosacea- mother  CVA/MS/melanoma- father   1/4/2022   C-ANCA (PR3) BY EIA <0.2…    C-ANCA FLOURESCENCE Negative…    P-ANCA (MPO) BY EIA <0.2…    P-ANCA FLOURESCENCE Negative…    KRISTAL Positive…    KRISTAL Titer 1:320…    KRISTAL Pattern Nucleolar…    SSA ANTIBODIES <0.2…    SSB ANTIBODIES <0.2…    Angiotensin 1 Conv 30…    Thyroid Peroxidase (TPO) Antibody <1.0…    Citrulline Antibody, IgG 31… (H)    Centromere Ab Negative…    BULMARO-1 ANTIBODY <0.2…    RNP  Antibody <0.2…         Previous Tx  Previous treatments:  Naproxen-somewhat helpful  Ibuprofen-somewhat helpful  Acetaminophen-somewhat helpful  Meloxicam- discontinued due to swelling.  Steroids-very helpful- Medrol, no side effects  Toradol- very helpful  Otezla- currently on  Orencia- currently on  Tremfya-     Health Maintenance  DXA- none  Malignancy Hx- none  Component      Latest Ref Rng 10/27/2021   TB Result Negative…    Hepatitis B Surface AB POSITIVE…      Component      Latest Ref Rng 1/4/2022   HIV 1/2 Antigen/Antibody Screen with Reflex to Confirmation Non Reactive…    Hiv Interpretation Negative…    Hepatitis B Surface AG      NEG  NEGATIVE      Immunization History   Administered Date(s) Administered    Flu vaccine, trivalent, preservative free, HIGH-DOSE, age 65y+ (Fluzone) 11/08/2024    Influenza, trivalent, adjuvanted 04/01/2022, 10/28/2022    Annmarie SARS-CoV-2 Vaccination 04/01/2022          Past Medical History:   Diagnosis Date    ADHD (attention deficit hyperactivity disorder)     Allergic     Arthritis     Asthma     Chronic pain disorder     Dermatitis     GERD (gastroesophageal reflux disease)     Lower extremity edema     Plaque psoriasis     Positive KRISTAL (antinuclear antibody)     Seropositive rheumatoid arthritis of multiple sites (Multi)       Past Surgical History:   Procedure Laterality Date    BLADDER SUSPENSION      BONE BIOPSY  1996    JAW    BREAST BIOPSY Bilateral     needle biopsies in separate times    HYSTERECTOMY  01/21/2014    Hysterectomy    WISDOM TOOTH EXTRACTION  1996      Current Outpatient Medications   Medication Sig Dispense Refill    abatacept (Orencia ClickJect) 125 mg/mL auto-injector auto-injection Inject 1 mL (125 mg) under the skin 1 (one) time per week. 4 mL 11    Adderall XR 20 mg 24 hr capsule Take 1 capsule (20 mg) by mouth once daily in the morning. Do not crush or chew. 30 capsule 0    albuterol (Ventolin HFA) 90 mcg/actuation inhaler Inhale 2 puffs  every 4 hours if needed for wheezing or shortness of breath. 18 g 3    amphetamine-dextroamphetamine XR (Adderall XR) 20 mg 24 hr capsule Take 1 capsule (20 mg) by mouth once daily in the morning. Do not crush or chew. 30 capsule 0    amphetamine-dextroamphetamine XR (Adderall XR) 20 mg 24 hr capsule Take 1 capsule (20 mg) by mouth once daily in the morning. Do not crush or chew. Do not fill before March 3, 2025. 30 capsule 0    amphetamine-dextroamphetamine XR (Adderall XR) 20 mg 24 hr capsule Take 1 capsule (20 mg) by mouth once daily in the morning. Do not crush or chew. 30 capsule 0    amphetamine-dextroamphetamine XR (Adderall XR) 20 mg 24 hr capsule Take 1 capsule (20 mg) by mouth once daily in the morning. Do not crush or chew. 30 capsule 0    apremilast (Otezla) 30 mg tablet TAKE ONE (1) TABLET BY MOUTH TWICE DAILY 60 tablet 11    cetirizine (ZyrTEC) 10 mg capsule 2 times a day.      cyclobenzaprine (Flexeril) 10 mg tablet Take 1 tablet (10 mg) by mouth. PRN      ergocalciferol (Vitamin D-2) 1.25 MG (22537 UT) capsule Take 1 capsule (50,000 Units) by mouth 2 times a week. 1 capsule po weekly for 90 days 24 capsule 3    fluconazole (Diflucan) 150 mg tablet Monday, Wednesday, Friday as needed for yeast infection. 12 tablet 1    folic acid (Folvite) 1 mg tablet 1 tablet Orally Once a day for 90 days      furosemide (Lasix) 20 mg tablet Take 1 tablet (20 mg) by mouth 3 times a day as needed (swelling). 90 tablet 3    hydroxychloroquine (PlaqueniL) 200 mg tablet 1 tablet Orally twice daily for 90 days 180 tablet 3    hydrOXYzine HCL (Atarax) 25 mg tablet Take 1 tablet (25 mg) by mouth every 8 hours if needed for itching. 90 tablet 3    ketorolac 60 mg/2 mL solution Inject 1 mL (30 mg) into the muscle every 28 (twenty-eight) days. 2 mL 3    lidocaine (Lidoderm) 5 % patch Place 1 patch over 12 hours on the skin once daily. Remove & discard patch within 12 hours or as directed by MD. 30 patch 11    nystatin  "(Mycostatin) 100,000 unit/gram powder Apply 1 Application topically 2 times a day. 60 g 1    omeprazole (PriLOSEC) 20 mg DR capsule Take 1 capsule (20 mg) by mouth once daily. Do not crush or chew. 30 capsule 11    potassium chloride ER (Micro-K) 10 mEq ER capsule 1 capsule (10 mEq). Takes with water pill       Current Facility-Administered Medications   Medication Dose Route Frequency Provider Last Rate Last Admin    BUPivacaine HCl (Marcaine) 0.5 % (5 mg/mL) injection 10 mg  2 mL injection Once Kody Patel MD        BUPivacaine HCl (Marcaine) 0.5 % (5 mg/mL) injection 15 mg  3 mL injection Once Kody Patel MD        methylPREDNISolone acetate (DEPO-Medrol) injection 30 mg  30 mg intramuscular Once Kody Patel MD        methylPREDNISolone acetate (DEPO-Medrol) injection 40 mg  40 mg intramuscular Once Kody Patel MD        methylPREDNISolone acetate (DEPO-Medrol) injection 80 mg  80 mg intramuscular Once Allison Cali Valentine MD        methylPREDNISolone acetate (DEPO-Medrol) injection 80 mg  80 mg intramuscular Once Allison Cali Valentine MD        methylPREDNISolone acetate (DEPO-Medrol) injection 80 mg  80 mg intramuscular Once Allison Cali Valentine MD          Allergies   Allergen Reactions    Other Rash     Rash from Adhesive from Band-Aid      Visit Vitals  /63 (BP Location: Right arm, Patient Position: Sitting, BP Cuff Size: Large adult)   Pulse 62   Temp 36.7 °C (98.1 °F) (Temporal)   Ht 1.651 m (5' 5\")   Wt 109 kg (240 lb)   SpO2 100%   BMI 39.94 kg/m²   OB Status Hysterectomy   Smoking Status Never   BSA 2.24 m²            Rapid 3  Function Score (FN): 1.7  Pain Score (PN) (0-10): 5.5  Patient Global (PTGL) (0-10): 3  Rapid3 Score: 10.2  RAPID3 Weighted Score: 3.4     Workup    Component      Latest Ref Rng 2/10/2025   WHITE BLOOD CELL COUNT      3.8 - 10.8 Thousand/uL 10.1    RED BLOOD CELL COUNT      3.80 - 5.10 Million/uL 4.84    HEMOGLOBIN      11.7 - 15.5 g/dL 13.9    HEMATOCRIT   "    35.0 - 45.0 % 43.1    MCV      80.0 - 100.0 fL 89.0    MCH      27.0 - 33.0 pg 28.7    MCHC      32.0 - 36.0 g/dL 32.3    RDW      11.0 - 15.0 % 12.1    PLATELET COUNT      140 - 400 Thousand/uL 326    MPV      7.5 - 12.5 fL 10.7    ABSOLUTE NEUTROPHILS      1,500 - 7,800 cells/uL 5,949    ABSOLUTE LYMPHOCYTES      850 - 3,900 cells/uL 3,242    ABSOLUTE MONOCYTES      200 - 950 cells/uL 616    ABSOLUTE EOSINOPHILS      15 - 500 cells/uL 232    ABSOLUTE BASOPHILS      0 - 200 cells/uL 61    NEUTROPHILS      % 58.9    LYMPHOCYTES      % 32.1    MONOCYTES      % 6.1    EOSINOPHILS      % 2.3    BASOPHILS      % 0.6    GLUCOSE      65 - 99 mg/dL 138 (H)    UREA NITROGEN (BUN)      7 - 25 mg/dL 13    CREATININE      0.50 - 1.03 mg/dL 0.66    EGFR      > OR = 60 mL/min/1.73m2 104    SODIUM      135 - 146 mmol/L 140    POTASSIUM      3.5 - 5.3 mmol/L 4.4    CHLORIDE      98 - 110 mmol/L 103    CARBON DIOXIDE      20 - 32 mmol/L 28    ELECTROLYTE BALANCE      7 - 17 mmol/L (calc) 9    CALCIUM      8.6 - 10.4 mg/dL 8.9    PROTEIN, TOTAL      6.1 - 8.1 g/dL 7.0    ALBUMIN      3.6 - 5.1 g/dL 4.3    BILIRUBIN, TOTAL      0.2 - 1.2 mg/dL 0.3    ALKALINE PHOSPHATASE      37 - 153 U/L 95    AST      10 - 35 U/L 17    ALT      6 - 29 U/L 17    CREATINE KINASE, TOTAL      21 - 240 U/L 51    SED RATE BY MODIFIED WESTERGREN      < OR = 30 mm/h 25    DNA (DS) ANTIBODY      IU/mL <1    COMPLEMENT COMPONENT C3C      83 - 193 mg/dL 160    COMPLEMENT COMPONENT C4C      15 - 57 mg/dL 18    C-REACTIVE PROTEIN      <8.0 mg/L 8.0 (H)    VITAMIN D,25-OH,TOTAL,IA      30 - 100 ng/mL 26 (L)         Assessment/Plan  No diagnosis found.         No orders of the defined types were placed in this encounter.         Since last appt, adherent and tolerating Orencia 125 mg weekly,  mg daily, and Otezla 30 mg daily.  Fingers DIPs, elbows, toes with L 2nd toe deformities  AM stiffness at least 60 min.   Difficulty raising arms  Ibuprofen and  Tylenol  Second biopsy from leg showed porokeratosis  Lac Hydrin did not work  Denies any recent or current infection.  Not on any glucocorticoids.  ROS+ for fatigue, blurry vision, swollen glands/nodes, MARIE, sun sens, joint pain/swelling/stiffness, numbness/tingling,   Advised on exercise, recommend quad strengthening.  Rapid 3 consistent with high severity.  Labs reviewed  D/w pt tx options   Switch to Orencia infusion due to lack of full efficacy with current regimen.  dizziness spells positional. ?vertigo, post recurrent ear infections and on immunosuppression- recommend ENT eval- ordered.  Podiatry eval  XR knees  Continue Otezla which has been helpful.  Continue HCQ  DXA  Advised of possible side effects and importance of monitoring.   All questions answered.  Patient to follow up with primary care provider regarding all other medical issues not addressed today and for medical chart updating.         Since last appt, adherent and tolerating regimen.  Recent fall with knee trauma.  Ibuprofen takes edge off.   Told to build muscle by Ortho now.   Denies any recent or current infection.  Not on any glucocorticoids.  ROS+ for fatigue, MARIE, abd pain, rashes, sun sens joint pain/swelling/stiffness 2 hr, rayanud's without pitting/ulceration.  R knee and wrist fingers pain for which she takes 7 daily ibuprofen.  Rapid 3 consistent with high severity.  Labs reviewed  D/w pt tx options and decided on would benefit from higher dose Orencia to aim for remission and decrease NSAID.  Orencia on call  Cont rest regimen  Replete vit D  Advised of possible side effects and importance of monitoring.   All questions answered.  Patient to follow up with primary care provider regarding all other medical issues not addressed today and for medical chart updating.           Since last appt, adherent and tolerating***  Denies any recent or current infection.  Not on any NSAIDs or glucocorticoids.  ROS+ for ***  Rapid 3 consistent with  ***  Labs reviewed  D/w pt tx options and decided on ***  Metfomrin trisal  Consider Mounjaro if does not tolerate metformin.  Advised of possible side effects and importance of monitoring.   All questions answered.  Patient to follow up with primary care provider regarding all other medical issues not addressed today and for medical chart updating.    Allison Valentine MD      Patient Care Team:  Luis Carlos Mason DO as PCP - General (Family Medicine)  Luis Carlos Mason DO as PCP - Employee [employer] PCP  Allison Valentine MD as Consulting Physician (Rheumatology)

## 2025-06-14 ENCOUNTER — PHARMACY VISIT (OUTPATIENT)
Dept: PHARMACY | Facility: CLINIC | Age: 55
End: 2025-06-14
Payer: COMMERCIAL

## 2025-06-15 LAB
25(OH)D3+25(OH)D2 SERPL-MCNC: 29 NG/ML (ref 30–100)
ALBUMIN SERPL-MCNC: 4.1 G/DL (ref 3.6–5.1)
ALP SERPL-CCNC: 89 U/L (ref 37–153)
ALT SERPL-CCNC: 16 U/L (ref 6–29)
ANION GAP SERPL CALCULATED.4IONS-SCNC: 10 MMOL/L (CALC) (ref 7–17)
AST SERPL-CCNC: 15 U/L (ref 10–35)
BASOPHILS # BLD AUTO: 48 CELLS/UL (ref 0–200)
BASOPHILS NFR BLD AUTO: 0.6 %
BILIRUB SERPL-MCNC: 0.4 MG/DL (ref 0.2–1.2)
BUN SERPL-MCNC: 15 MG/DL (ref 7–25)
C1Q SERPL-MCNC: NORMAL UG/ML
C3 SERPL-MCNC: NORMAL MG/DL
C4 SERPL-MCNC: NORMAL MG/DL
CALCIUM SERPL-MCNC: 9.2 MG/DL (ref 8.6–10.4)
CHLORIDE SERPL-SCNC: 104 MMOL/L (ref 98–110)
CK SERPL-CCNC: 39 U/L (ref 21–240)
CO2 SERPL-SCNC: 26 MMOL/L (ref 20–32)
COLLAGEN CTX SERPL-MCNC: 368 PG/ML
CREAT SERPL-MCNC: 0.72 MG/DL (ref 0.5–1.03)
CRP SERPL-MCNC: 7.6 MG/L
DSDNA AB SER-ACNC: <1 IU/ML
EGFRCR SERPLBLD CKD-EPI 2021: 99 ML/MIN/1.73M2
EOSINOPHIL # BLD AUTO: 152 CELLS/UL (ref 15–500)
EOSINOPHIL NFR BLD AUTO: 1.9 %
ERYTHROCYTE [DISTWIDTH] IN BLOOD BY AUTOMATED COUNT: 12.9 % (ref 11–15)
ERYTHROCYTE [SEDIMENTATION RATE] IN BLOOD BY WESTERGREN METHOD: 14 MM/H
GLUCOSE SERPL-MCNC: 116 MG/DL (ref 65–99)
HCT VFR BLD AUTO: 42.6 % (ref 35–45)
HGB BLD-MCNC: 13.8 G/DL (ref 11.7–15.5)
IGNF NEG CNTRL BLD: NORMAL
IL6 SERPL-MCNC: NORMAL PG/ML
LYMPHOCYTES # BLD AUTO: 3432 CELLS/UL (ref 850–3900)
LYMPHOCYTES NFR BLD AUTO: 42.9 %
M TB IFN-G BLD-IMP: NEGATIVE
MCH RBC QN AUTO: 28.9 PG (ref 27–33)
MCHC RBC AUTO-ENTMCNC: 32.4 G/DL (ref 32–36)
MCV RBC AUTO: 89.1 FL (ref 80–100)
MITOGEN IGNF.SPOT COUNT BLD: NORMAL
MONOCYTES # BLD AUTO: 480 CELLS/UL (ref 200–950)
MONOCYTES NFR BLD AUTO: 6 %
NEUTROPHILS # BLD AUTO: 3888 CELLS/UL (ref 1500–7800)
NEUTROPHILS NFR BLD AUTO: 48.6 %
PLATELET # BLD AUTO: 284 THOUSAND/UL (ref 140–400)
PMV BLD REES-ECKER: 10.3 FL (ref 7.5–12.5)
POTASSIUM SERPL-SCNC: 4.3 MMOL/L (ref 3.5–5.3)
PROT SERPL-MCNC: 6.7 G/DL (ref 6.1–8.1)
QUEST PANEL A SPOT COUNT: 1
QUEST PANEL B SPOT COUNT: 1
RBC # BLD AUTO: 4.78 MILLION/UL (ref 3.8–5.1)
SODIUM SERPL-SCNC: 140 MMOL/L (ref 135–146)
WBC # BLD AUTO: 8 THOUSAND/UL (ref 3.8–10.8)

## 2025-06-20 LAB
25(OH)D3+25(OH)D2 SERPL-MCNC: 29 NG/ML (ref 30–100)
ALBUMIN SERPL-MCNC: 4.1 G/DL (ref 3.6–5.1)
ALP SERPL-CCNC: 89 U/L (ref 37–153)
ALT SERPL-CCNC: 16 U/L (ref 6–29)
ANION GAP SERPL CALCULATED.4IONS-SCNC: 10 MMOL/L (CALC) (ref 7–17)
AST SERPL-CCNC: 15 U/L (ref 10–35)
BASOPHILS # BLD AUTO: 48 CELLS/UL (ref 0–200)
BASOPHILS NFR BLD AUTO: 0.6 %
BILIRUB SERPL-MCNC: 0.4 MG/DL (ref 0.2–1.2)
BUN SERPL-MCNC: 15 MG/DL (ref 7–25)
C1Q SERPL-MCNC: 7.3 MG/DL (ref 5–8.6)
C3 SERPL-MCNC: 169 MG/DL (ref 83–193)
C4 SERPL-MCNC: 17 MG/DL (ref 15–57)
CALCIUM SERPL-MCNC: 9.2 MG/DL (ref 8.6–10.4)
CHLORIDE SERPL-SCNC: 104 MMOL/L (ref 98–110)
CK SERPL-CCNC: 39 U/L (ref 21–240)
CO2 SERPL-SCNC: 26 MMOL/L (ref 20–32)
COLLAGEN CTX SERPL-MCNC: 368 PG/ML
CREAT SERPL-MCNC: 0.72 MG/DL (ref 0.5–1.03)
CRP SERPL-MCNC: 7.6 MG/L
DSDNA AB SER-ACNC: <1 IU/ML
EGFRCR SERPLBLD CKD-EPI 2021: 99 ML/MIN/1.73M2
EOSINOPHIL # BLD AUTO: 152 CELLS/UL (ref 15–500)
EOSINOPHIL NFR BLD AUTO: 1.9 %
ERYTHROCYTE [DISTWIDTH] IN BLOOD BY AUTOMATED COUNT: 12.9 % (ref 11–15)
ERYTHROCYTE [SEDIMENTATION RATE] IN BLOOD BY WESTERGREN METHOD: 14 MM/H
GLUCOSE SERPL-MCNC: 116 MG/DL (ref 65–99)
HCT VFR BLD AUTO: 42.6 % (ref 35–45)
HGB BLD-MCNC: 13.8 G/DL (ref 11.7–15.5)
IGNF NEG CNTRL BLD: NORMAL
IL6 SERPL-MCNC: 4.07 PG/ML
LYMPHOCYTES # BLD AUTO: 3432 CELLS/UL (ref 850–3900)
LYMPHOCYTES NFR BLD AUTO: 42.9 %
M TB IFN-G BLD-IMP: NEGATIVE
MCH RBC QN AUTO: 28.9 PG (ref 27–33)
MCHC RBC AUTO-ENTMCNC: 32.4 G/DL (ref 32–36)
MCV RBC AUTO: 89.1 FL (ref 80–100)
MITOGEN IGNF.SPOT COUNT BLD: NORMAL
MONOCYTES # BLD AUTO: 480 CELLS/UL (ref 200–950)
MONOCYTES NFR BLD AUTO: 6 %
NEUTROPHILS # BLD AUTO: 3888 CELLS/UL (ref 1500–7800)
NEUTROPHILS NFR BLD AUTO: 48.6 %
PLATELET # BLD AUTO: 284 THOUSAND/UL (ref 140–400)
PMV BLD REES-ECKER: 10.3 FL (ref 7.5–12.5)
POTASSIUM SERPL-SCNC: 4.3 MMOL/L (ref 3.5–5.3)
PROT SERPL-MCNC: 6.7 G/DL (ref 6.1–8.1)
QUEST PANEL A SPOT COUNT: 1
QUEST PANEL B SPOT COUNT: 1
RBC # BLD AUTO: 4.78 MILLION/UL (ref 3.8–5.1)
SODIUM SERPL-SCNC: 140 MMOL/L (ref 135–146)
WBC # BLD AUTO: 8 THOUSAND/UL (ref 3.8–10.8)

## 2025-06-21 DIAGNOSIS — K21.9 GASTROESOPHAGEAL REFLUX DISEASE, UNSPECIFIED WHETHER ESOPHAGITIS PRESENT: ICD-10-CM

## 2025-06-24 ENCOUNTER — TELEPHONE (OUTPATIENT)
Facility: CLINIC | Age: 55
End: 2025-06-24

## 2025-06-24 DIAGNOSIS — E11.69 TYPE 2 DIABETES MELLITUS WITH OTHER SPECIFIED COMPLICATION, WITHOUT LONG-TERM CURRENT USE OF INSULIN: Primary | ICD-10-CM

## 2025-06-25 ENCOUNTER — PHARMACY VISIT (OUTPATIENT)
Dept: PHARMACY | Facility: CLINIC | Age: 55
End: 2025-06-25
Payer: COMMERCIAL

## 2025-06-25 RX ORDER — OMEPRAZOLE 20 MG/1
CAPSULE, DELAYED RELEASE ORAL
Qty: 30 CAPSULE | Refills: 11 | Status: SHIPPED | OUTPATIENT
Start: 2025-06-25

## 2025-06-30 ENCOUNTER — SPECIALTY PHARMACY (OUTPATIENT)
Dept: PHARMACY | Facility: CLINIC | Age: 55
End: 2025-06-30

## 2025-06-30 PROCEDURE — RXMED WILLOW AMBULATORY MEDICATION CHARGE

## 2025-07-02 ENCOUNTER — APPOINTMENT (OUTPATIENT)
Dept: RHEUMATOLOGY | Facility: CLINIC | Age: 55
End: 2025-07-02
Payer: COMMERCIAL

## 2025-07-02 ENCOUNTER — PHARMACY VISIT (OUTPATIENT)
Dept: PHARMACY | Facility: CLINIC | Age: 55
End: 2025-07-02
Payer: COMMERCIAL

## 2025-07-06 ENCOUNTER — APPOINTMENT (OUTPATIENT)
Dept: RADIOLOGY | Facility: HOSPITAL | Age: 55
End: 2025-07-06
Payer: COMMERCIAL

## 2025-07-06 ENCOUNTER — HOSPITAL ENCOUNTER (EMERGENCY)
Facility: HOSPITAL | Age: 55
Discharge: HOME | End: 2025-07-06
Attending: STUDENT IN AN ORGANIZED HEALTH CARE EDUCATION/TRAINING PROGRAM
Payer: COMMERCIAL

## 2025-07-06 VITALS
RESPIRATION RATE: 16 BRPM | SYSTOLIC BLOOD PRESSURE: 114 MMHG | BODY MASS INDEX: 40.48 KG/M2 | WEIGHT: 243 LBS | DIASTOLIC BLOOD PRESSURE: 65 MMHG | HEIGHT: 65 IN | OXYGEN SATURATION: 97 % | HEART RATE: 60 BPM | TEMPERATURE: 97.5 F

## 2025-07-06 DIAGNOSIS — M79.672 ACUTE FOOT PAIN, LEFT: Primary | ICD-10-CM

## 2025-07-06 PROCEDURE — 73610 X-RAY EXAM OF ANKLE: CPT | Mod: RIGHT SIDE | Performed by: RADIOLOGY

## 2025-07-06 PROCEDURE — 73610 X-RAY EXAM OF ANKLE: CPT | Mod: RT

## 2025-07-06 PROCEDURE — 99283 EMERGENCY DEPT VISIT LOW MDM: CPT | Performed by: STUDENT IN AN ORGANIZED HEALTH CARE EDUCATION/TRAINING PROGRAM

## 2025-07-06 RX ORDER — HYDROCODONE BITARTRATE AND ACETAMINOPHEN 5; 325 MG/1; MG/1
1 TABLET ORAL EVERY 6 HOURS PRN
Qty: 8 TABLET | Refills: 0 | Status: SHIPPED | OUTPATIENT
Start: 2025-07-06 | End: 2025-07-09

## 2025-07-06 RX ORDER — DICLOFENAC SODIUM 50 MG/1
50 TABLET, DELAYED RELEASE ORAL 2 TIMES DAILY
Qty: 60 TABLET | Refills: 0 | Status: SHIPPED | OUTPATIENT
Start: 2025-07-06 | End: 2026-07-06

## 2025-07-06 ASSESSMENT — PAIN SCALES - GENERAL: PAINLEVEL_OUTOF10: 8

## 2025-07-06 ASSESSMENT — PAIN DESCRIPTION - ORIENTATION: ORIENTATION: RIGHT

## 2025-07-06 ASSESSMENT — PAIN - FUNCTIONAL ASSESSMENT: PAIN_FUNCTIONAL_ASSESSMENT: 0-10

## 2025-07-06 ASSESSMENT — PAIN DESCRIPTION - LOCATION: LOCATION: FOOT

## 2025-07-06 NOTE — ED PROVIDER NOTES
"Emergency Department Provider Note       History of Present Illness     History provided by: {History Provided By:68249}  Limitations to History: {History Limitations:51732}  External Records Reviewed with Brief Summary: {ED External Records Reviewed with Brief Summary:94344}    HPI:  Sue Schafer is a 54 y.o. female who presents with foot pain.  Patient has a history of rheumatoid arthritis, on multiple Biologics.  Patient states that she was walking when she suddenly felt a pain and burning sensation with a pop.  Pain mostly localized to the dorsal surface of the lateral edge of her foot.  More proximal, closer to the ankle.  Prior history of similar pain in the past but brief in nature, today's pain is persistent.    Physical Exam   Triage vitals:  T 36.4 °C (97.5 °F)  HR 60  /65  RR 16  O2 97 %      {ED Physical Exam:30678}      Medical Decision Making & ED Course   Medical Decision Makin y.o. female ***  ----  {Scoring Tools Utilized:66484}    Social Determinants of Health which Significantly Impact Care: {Social Determinants of Health which Significantly Impact Care:03156} {The following actions were taken to address these social determinants (Optional):00059}    EKG Independent Interpretation: {EKG Independent Interpretation:63374}    Independent Result Review and Interpretation: {Independent Result Review and Interpretation:38755::\"Relevant laboratory and radiographic results were reviewed and independently interpreted by myself.  As necessary, they are commented on in the ED Course.\"}    Chronic conditions affecting the patient's care: {Chronic conditions affecting the patient's care:44924::\"As documented above in MDM\"}    The patient was discussed with the following consultants/services: {The patient was discussed with the following consultants/services:15632}    Care Considerations: {Care Considerations:66432::\"As documented above in MDM\"}    ED Course:  Diagnoses as of 25 0233 "   Acute foot pain, left       Disposition   {ED Disposition:51330}    Procedures   Procedures    Candie Fox MD  Emergency Medicine

## 2025-07-06 NOTE — DISCHARGE INSTRUCTIONS
You were seen in the emergency department today for foot pain.  At this time you have no evidence of any fracture on your x-ray but it is possible you still injured a tendon or ligament.  I have placed you in a hard sole shoe and given you crutches.  Please follow-up with podiatry or foot and ankle for recheck.

## 2025-07-06 NOTE — Clinical Note
Sue Schafer was seen and treated in our emergency department on 7/6/2025.  She may return to work on 07/06/2025.  Desk duty until cleared by orthopedics/podiatry     If you have any questions or concerns, please don't hesitate to call.      Candie Fox MD

## 2025-07-09 ENCOUNTER — APPOINTMENT (OUTPATIENT)
Facility: CLINIC | Age: 55
End: 2025-07-09
Payer: COMMERCIAL

## 2025-07-17 ENCOUNTER — SPECIALTY PHARMACY (OUTPATIENT)
Dept: PHARMACY | Facility: CLINIC | Age: 55
End: 2025-07-17

## 2025-07-21 ENCOUNTER — SPECIALTY PHARMACY (OUTPATIENT)
Dept: PHARMACY | Facility: CLINIC | Age: 55
End: 2025-07-21

## 2025-07-21 ENCOUNTER — PHARMACY VISIT (OUTPATIENT)
Dept: PHARMACY | Facility: CLINIC | Age: 55
End: 2025-07-21
Payer: COMMERCIAL

## 2025-07-21 PROCEDURE — RXMED WILLOW AMBULATORY MEDICATION CHARGE

## 2025-07-24 DIAGNOSIS — F90.0 ATTENTION DEFICIT HYPERACTIVITY DISORDER, INATTENTIVE TYPE, MILD: ICD-10-CM

## 2025-07-24 DIAGNOSIS — J45.909 ASTHMA, UNSPECIFIED ASTHMA SEVERITY, UNSPECIFIED WHETHER COMPLICATED, UNSPECIFIED WHETHER PERSISTENT (HHS-HCC): ICD-10-CM

## 2025-07-24 PROCEDURE — RXMED WILLOW AMBULATORY MEDICATION CHARGE

## 2025-07-24 RX ORDER — DEXTROAMPHETAMINE SACCHARATE, AMPHETAMINE ASPARTATE MONOHYDRATE, DEXTROAMPHETAMINE SULFATE AND AMPHETAMINE SULFATE 5; 5; 5; 5 MG/1; MG/1; MG/1; MG/1
20 CAPSULE, EXTENDED RELEASE ORAL EVERY MORNING
Qty: 30 CAPSULE | Refills: 0 | Status: CANCELLED | OUTPATIENT
Start: 2025-07-24 | End: 2025-08-23

## 2025-07-24 RX ORDER — ALBUTEROL SULFATE 90 UG/1
2 INHALANT RESPIRATORY (INHALATION) EVERY 4 HOURS PRN
Qty: 18 G | Refills: 3 | Status: CANCELLED | OUTPATIENT
Start: 2025-07-24

## 2025-07-24 NOTE — TELEPHONE ENCOUNTER
Prescription Request for Controlled Substance         Has The Patient Been Identified By Name And Date Of Birth: Yes    RX Requestor: Pharmacy    Date of Last Refill: 05/30/2025    Date of Last Controlled Substance Agreement Signed:  None in chart    Date of Last Drug Screen:  None in chart    Date Of Last Office Visit: 06/13/2025    Date Of Future Office Visit: 10/17/2025

## 2025-07-25 ENCOUNTER — APPOINTMENT (OUTPATIENT)
Dept: RADIOLOGY | Facility: HOSPITAL | Age: 55
End: 2025-07-25
Payer: COMMERCIAL

## 2025-07-25 ENCOUNTER — HOSPITAL ENCOUNTER (OUTPATIENT)
Dept: RADIOLOGY | Facility: CLINIC | Age: 55
Discharge: HOME | End: 2025-07-25
Payer: COMMERCIAL

## 2025-07-25 DIAGNOSIS — S82.51XD DISPLACED FRACTURE OF MEDIAL MALLEOLUS OF RIGHT TIBIA, SUBSEQUENT ENCOUNTER FOR CLOSED FRACTURE WITH ROUTINE HEALING: ICD-10-CM

## 2025-07-25 PROCEDURE — 73721 MRI JNT OF LWR EXTRE W/O DYE: CPT | Mod: RT

## 2025-07-26 RX ORDER — DEXTROAMPHETAMINE SACCHARATE, AMPHETAMINE ASPARTATE MONOHYDRATE, DEXTROAMPHETAMINE SULFATE AND AMPHETAMINE SULFATE 5; 5; 5; 5 MG/1; MG/1; MG/1; MG/1
20 CAPSULE, EXTENDED RELEASE ORAL EVERY MORNING
Qty: 30 CAPSULE | Refills: 0 | Status: SHIPPED | OUTPATIENT
Start: 2025-07-26 | End: 2025-08-25

## 2025-07-28 PROCEDURE — RXMED WILLOW AMBULATORY MEDICATION CHARGE

## 2025-07-29 ENCOUNTER — SPECIALTY PHARMACY (OUTPATIENT)
Dept: PHARMACY | Facility: CLINIC | Age: 55
End: 2025-07-29

## 2025-07-29 NOTE — PROGRESS NOTES
[facility] Clinical Note  Patient Reassessment     Introduction  Sue Schafer is a 55 y.o. female who is on the specialty pharmacy service for management of: Rheumatology Core.      [facility]: Otezla 30 mg by mouth twice daily.  Orencia 125 mg under the skin once weekly        Duration of therapy: Maintenance    The most recent encounter visit with the referring prescriber Allison Valentine on 6/13/25 was reviewed.  Pharmacy will continue to collaborate in the care of this patient with the referring prescriber.    Discussion  Sue was contacted on 7/29/2025 at 2:20 PM for a pharmacy visit with encounter number [id number] from:   [facility]  [address] [place] [id number]  Dept: [phone]  Dept Fax: [phone]  Sue consented to a/an Telephone visit, which was performed.    Efficacy  Patient has developed new symptoms of condition: No  Patient/caregiver feels medication is affecting the disease state: Reports Otezla is working for psoriasis. Notes that she can tell when Orencia is wearing off. Notes frequent flares. She wants to get onto Orencia infusions, but insurance continues to deny.    Goals  Provided education on goals and possible outcomes of therapy:  Adherence with therapy  Timely completion of appropriate labs  Timely and appropriate follow up with provider  Identify and address medication interactions with presciption medications, OTC medications and supplements  Optimize or maintain quality of life  Dermatology: Prevent or reduce disease flares  Reduction of plaque size, thickness and body surface area (PSO)  Rheumatology: Remission or low disease activity  Reduction of inflammation, joint pain, swelling, and morning stiffness  Patient has documented target(s) for goals of therapy: No        Tolerance  Patient has experienced side effects from this medication: No  Changes to current therapy regimen:  No    The follow-up timeline was discussed. Every person responds to and reacts to therapy differently. Patient should be assessed for efficacy and tolerability in approximately: 6 months            Adherence  Patient Information  Informant: Self (Patient)  Demonstrates Understanding of Importance of Adherence: Yes  Does the patient have any barriers to self-administration (including physical and mental?): No  Support Network for Adherence: Healthcare Provider  Medication Information  Medication: abatacept (Orencia ClickJect) (and Otezla)  Patient Reported Missed Doses in the Last 4 Weeks: 0  Estimated Medication Adherence Level: Good  Adherence Estimation Source: Claims history  Barriers to Adherence: No Problems identified  What concerns do you have regarding your medications?: None   The importance of adherence was discussed and patient/caregiver was advised to take the medication as prescribed by their provider. Encouraged patient/caregiver to call physician's office or specialty pharmacy if they have a question regarding a missed dose.    General Assessment  Changes to home medications, OTCs or supplements: Yes - reports recently starting Mounjaro  Current Medications[1]  Reported new allergies: No  Reported new medical conditions: No  Additional monitoring reviewed: Rheumatology- TB:   Lab Results   Component Value Date    TBSIN Negative 06/12/2025    TBGRES Negative  Reference range: NEGATIVE   10/27/2021    and Hepatitis B panel:   Lab Results   Component Value Date    HEPBSAG NEGATIVE 01/04/2022    HEPBSAB  10/27/2021     POSITIVE   Anti-HBs concentration detected at >10 mIU/ml. Individual is   considered to be immune to infection with HBV.  Performed at [address] [address]       Is laboratory follow up needed? No    Advised to contact the pharmacy if there are any changes to the patient's medication list, including prescriptions, OTC medications, herbal products, or  supplements.    Impression/Plan  This patient has not been identified as high risk due to Lack of high risk qualifiers.  The following action was taken:N/A          QOL/Patient Satisfaction  Rate your quality of life on scale of 1-10: 7  Rate your satisfaction with  Specialty Pharmacy on scale of 1-10: 10 - Completely satisfied    Provided contact information ([phone]) for [facility] and reviewed dispensing process, refill timeline and patient management follow up. Confirmed understanding of education conducted during assessment. All questions and concerns were addressed and patient/caregiver was encouraged to reach out for additional questions or concerns.    Based on the patient's diagnosis, medication list, progress towards goals, adherence, tolerance, and medication list, medication remains appropriate: Therapy remains appropriate (I attest)    Robin Ordonez, PharmD       [1]   Current Outpatient Medications   Medication Sig Dispense Refill    abatacept (Orencia ClickJect) 125 mg/mL auto-injector auto-injection Inject 1 mL (125 mg) under the skin 1 (one) time per week. 4 mL 11    Adderall XR 20 mg 24 hr capsule Take 1 capsule (20 mg) by mouth once daily in the morning. Do not crush or chew. 30 capsule 0    albuterol (Ventolin HFA) 90 mcg/actuation inhaler Inhale 2 puffs every 4 hours if needed for wheezing or shortness of breath. 18 g 3    amphetamine-dextroamphetamine XR (Adderall XR) 20 mg 24 hr capsule Take 1 capsule (20 mg) by mouth once daily in the morning. Do not crush or chew. 30 capsule 0    amphetamine-dextroamphetamine XR (Adderall XR) 20 mg 24 hr capsule Take 1 capsule (20 mg) by mouth once daily in the morning. Do not crush or chew. Do not fill before March 3, 2025. 30 capsule 0    amphetamine-dextroamphetamine XR (Adderall XR) 20 mg 24 hr capsule Take 1 capsule (20 mg) by mouth once daily in the morning. Do not crush or chew. 30 capsule 0     amphetamine-dextroamphetamine XR (Adderall XR) 20 mg 24 hr capsule Take 1 capsule (20 mg) by mouth once daily in the morning. Do not crush or chew. 30 capsule 0    apremilast (Otezla) 30 mg tablet TAKE ONE (1) TABLET BY MOUTH TWICE DAILY 60 tablet 11    cetirizine (ZyrTEC) 10 mg capsule 2 times a day.      cyclobenzaprine (Flexeril) 10 mg tablet Take 1 tablet (10 mg) by mouth. PRN      diclofenac (Voltaren) 50 mg EC tablet Take 1 tablet (50 mg) by mouth 2 times a day. Do not crush, chew, or split. 60 tablet 0    ergocalciferol (Vitamin D-2) 1.25 MG (42898 UT) capsule Take 1 capsule (50,000 Units) by mouth 2 times a week. 1 capsule po weekly for 90 days 24 capsule 3    fluconazole (Diflucan) 150 mg tablet Monday, Wednesday, Friday as needed for yeast infection. 12 tablet 1    folic acid (Folvite) 1 mg tablet 1 tablet Orally Once a day for 90 days      furosemide (Lasix) 20 mg tablet Take 1 tablet (20 mg) by mouth 3 times a day as needed (swelling). 90 tablet 3    hydroxychloroquine (PlaqueniL) 200 mg tablet 1 tablet Orally twice daily for 90 days 180 tablet 3    hydrOXYzine HCL (Atarax) 25 mg tablet Take 1 tablet (25 mg) by mouth every 8 hours if needed for itching. 90 tablet 3    ketorolac 60 mg/2 mL solution Inject 1 mL (30 mg) into the muscle every 28 (twenty-eight) days. 2 mL 3    lidocaine (Lidoderm) 5 % patch Place 1 patch over 12 hours on the skin once daily. Remove & discard patch within 12 hours or as directed by MD. 30 patch 11    metFORMIN (Glucophage) 500 mg tablet Take 1 tablet (500 mg) by mouth 2 times daily (morning and late afternoon). 14 tablet 1    omeprazole (PriLOSEC) 20 mg DR capsule TAKE ONE CAPSULE BY MOUTH once a DAY. do not crush or chew 30 capsule 11    potassium chloride ER (Micro-K) 10 mEq ER capsule 1 capsule (10 mEq). Takes with water pill      tirzepatide 2.5 mg/0.5 mL pen injector Inject 2.5 mg under the skin every 7 days. 2 mL 3     Current Facility-Administered Medications    Medication Dose Route Frequency Provider Last Rate Last Admin    BUPivacaine HCl (Marcaine) 0.5 % (5 mg/mL) injection 10 mg  2 mL injection Once Kody Patel MD        BUPivacaine HCl (Marcaine) 0.5 % (5 mg/mL) injection 15 mg  3 mL injection Once Kody Patel MD        methylPREDNISolone acetate (DEPO-Medrol) injection 30 mg  30 mg intramuscular Once Kody Patel MD        methylPREDNISolone acetate (DEPO-Medrol) injection 40 mg  40 mg intramuscular Once Kody Patel MD        methylPREDNISolone acetate (DEPO-Medrol) injection 80 mg  80 mg intramuscular Once Allison Valentine MD        methylPREDNISolone acetate (DEPO-Medrol) injection 80 mg  80 mg intramuscular Once Allison Valentine MD        methylPREDNISolone acetate (DEPO-Medrol) injection 80 mg  80 mg intramuscular Once Allison Valentine MD

## 2025-08-05 ENCOUNTER — EVALUATION (OUTPATIENT)
Dept: PHYSICAL THERAPY | Facility: CLINIC | Age: 55
End: 2025-08-05
Payer: COMMERCIAL

## 2025-08-05 DIAGNOSIS — M76.71 PERONEAL TENDONITIS, RIGHT: Primary | ICD-10-CM

## 2025-08-05 PROCEDURE — 97161 PT EVAL LOW COMPLEX 20 MIN: CPT | Mod: GP | Performed by: PHYSICAL THERAPIST

## 2025-08-05 ASSESSMENT — ENCOUNTER SYMPTOMS
DEPRESSION: 0
OCCASIONAL FEELINGS OF UNSTEADINESS: 1
LOSS OF SENSATION IN FEET: 0

## 2025-08-05 NOTE — PROGRESS NOTES
Physical Therapy Evaluation and Treatment      Patient Name: Sue Schafer  MRN: [id number]  Today's Date: 8/5/2025  Time Calculation  Start Time: 1450  Stop Time: 1531  Time Calculation (min): 41 min      Insurance:  Visit number: 1 of 6  Authorization info: 07/31/2025 ANTHEM: TS: AUTH REQUIRED/ $3500 DEDUCTIBLE (MET)/ 90% COVERAGE/ 30 VISITS (0 USED)/ $5100 OOP (NOT MET)/ NOTES: CALENDAR YEAR BENEFIT/  Transaction ID: [id number]   Insurance Type: Payor:  EMPLOYEE MEDICAL PLAN / Plan:  EMPLOYEE MEDICAL PLAN CONSUMER [employer] / Product Type: *No Product type* /     Current Problem:   1. Peroneal tendonitis, right  Follow Up In Physical Therapy            Subjective    General:  Patient reports in October of 2024 she started having pain in her right foot/ankle. Has history of plantar fasciitis for several years, but this has been different pain. Is getting a burning sensation along the outside of her right ankle, but also getting pain along the inside of right foot. This has not gotten better despite rest, bracing, etc. Pain from pressure when wearing shoes and usually wears black brace and compression socks. No injections. Rides motorcycle, this has been uncomfortable with wearing motorcycle boot. Works at  as medical assistant - desk duty - which has been difficult when getting up to printer, etc. Pain wakes her up in the middle of the night and she has a hard time going to sleep. Uses ice at night to try and help. Driving in car with cruise control. Would be interested in ultrasound.       Precautions: none  Pain: 7/10       Objective   ROM   Right ankle AROM:  PF 31 deg  DF neutral  Inversion 5  deg  Eversion 7 deg  (Pain with all)       MMT   Right ankle strength:  PF 4/5  DF 4/5  Inversion 4-/5  Eversion 4-/5  (Pain with all)      Palpation   TTP right peroneals  TTP right MLA  Mild sensitivity of right foot dorsal aspect    Moderate edema in right foot/ankle      Flexibility   Moderate tightness in R  gastroc/soleus  Moderate tightness in R achilles       Special Tests   Ankle: Anterior Drawer: Right negative, Anterior Ankle Impingement: Right negative       Transfers   No UE support for sit to stand      Gait   Ambulates with decreased guilherme and antalgic gait. Decreased stance time on right and decreased step length. Decreased heel to toe on right with minimal heel strike      Stairs   Ascends and descends with step-to-pattern      Outcome Measures:  DAVID: 59/104    Treatments:  Therapeutic Exercise: Access Code [email or web address]  Gastroc stretch at wall  Gastroc stretch with towel  Heel raises   Ankle inversion with ball/towel         Assessment   Assessment:   Pt is a 55 y.o. female with right peroneal tendonitis. Pt with gait deficits, impaired balance, decreased AROM, reduced strength, edema, and flexibility restrictions. Pt will benefit from skilled PT to address the above deficits for return to full activities.         Low complexity due to patient's clinical presentation being stable and uncomplicated by any significant comorbidities that may affect rehab tolerance and progression.       Plan:   Treatment/Interventions: Education/ Instruction, Gait training, Manual therapy, Neuromuscular re-education, Self care/ home management, Taping techniques, Therapeutic activities, Therapeutic exercises, Ultrasound  PT Plan: Skilled PT  PT Frequency: 1 time per week  Duration: 5 more visits  Number of Treatments Authorized: EVAL only  Rehab Potential: Good  Plan of Care Agreement: Patient      Goals:   Active       Mobility       Goal 1       Start:  08/05/25    Expected End:  11/03/25       Pt will improve right foot/ankle AROM to WNL to improve I/ADLs         Goal 2       Start:  08/05/25    Expected End:  11/03/25       Pt will improve right foot/ankle strength to 5/5 to improve I/ADLs.            Pain       Goal 1       Start:  08/05/25    Expected End:  11/03/25       Pt will perform all work tasks with <3/10 pain          Goal 2       Start:  08/05/25    Expected End:  11/03/25       Pt will stand/walk >30 min with <3/10 pain and no brace to improve I/ADLs.

## 2025-08-12 DIAGNOSIS — B99.9 INFECTION: ICD-10-CM

## 2025-08-13 ENCOUNTER — SPECIALTY PHARMACY (OUTPATIENT)
Dept: PHARMACY | Facility: CLINIC | Age: 55
End: 2025-08-13

## 2025-08-13 ENCOUNTER — PHARMACY VISIT (OUTPATIENT)
Dept: PHARMACY | Facility: CLINIC | Age: 55
End: 2025-08-13
Payer: COMMERCIAL

## 2025-08-13 PROCEDURE — RXMED WILLOW AMBULATORY MEDICATION CHARGE

## 2025-08-13 RX ORDER — FLUCONAZOLE 150 MG/1
TABLET ORAL
Qty: 12 TABLET | Refills: 1 | Status: SHIPPED | OUTPATIENT
Start: 2025-08-13

## 2025-08-14 ENCOUNTER — TELEPHONE (OUTPATIENT)
Facility: CLINIC | Age: 55
End: 2025-08-14
Payer: COMMERCIAL

## 2025-08-14 ENCOUNTER — TREATMENT (OUTPATIENT)
Dept: PHYSICAL THERAPY | Facility: CLINIC | Age: 55
End: 2025-08-14
Payer: COMMERCIAL

## 2025-08-14 DIAGNOSIS — Z79.899 ENCOUNTER FOR LONG-TERM (CURRENT) USE OF MEDICATIONS: Primary | ICD-10-CM

## 2025-08-14 DIAGNOSIS — M76.71 PERONEAL TENDONITIS, RIGHT: ICD-10-CM

## 2025-08-14 DIAGNOSIS — M05.79 SEROPOSITIVE RHEUMATOID ARTHRITIS OF MULTIPLE SITES (MULTI): ICD-10-CM

## 2025-08-14 PROCEDURE — 97110 THERAPEUTIC EXERCISES: CPT | Mod: GP,CQ

## 2025-08-19 ENCOUNTER — TREATMENT (OUTPATIENT)
Dept: PHYSICAL THERAPY | Facility: CLINIC | Age: 55
End: 2025-08-19
Payer: COMMERCIAL

## 2025-08-19 DIAGNOSIS — M76.71 PERONEAL TENDONITIS, RIGHT: ICD-10-CM

## 2025-08-19 PROCEDURE — 97110 THERAPEUTIC EXERCISES: CPT | Mod: GP,CQ

## 2025-08-21 ENCOUNTER — PHARMACY VISIT (OUTPATIENT)
Dept: PHARMACY | Facility: CLINIC | Age: 55
End: 2025-08-21
Payer: COMMERCIAL

## 2025-08-25 DIAGNOSIS — J45.909 ASTHMA, UNSPECIFIED ASTHMA SEVERITY, UNSPECIFIED WHETHER COMPLICATED, UNSPECIFIED WHETHER PERSISTENT (HHS-HCC): ICD-10-CM

## 2025-08-25 RX ORDER — ALBUTEROL SULFATE 90 UG/1
2 INHALANT RESPIRATORY (INHALATION) EVERY 4 HOURS PRN
Qty: 18 G | Refills: 3 | Status: SHIPPED | OUTPATIENT
Start: 2025-08-25

## 2025-08-26 ENCOUNTER — APPOINTMENT (OUTPATIENT)
Dept: PHYSICAL THERAPY | Facility: CLINIC | Age: 55
End: 2025-08-26
Payer: COMMERCIAL

## 2025-08-28 ENCOUNTER — APPOINTMENT (OUTPATIENT)
Dept: PHYSICAL THERAPY | Facility: CLINIC | Age: 55
End: 2025-08-28
Payer: COMMERCIAL

## 2025-09-03 ENCOUNTER — APPOINTMENT (OUTPATIENT)
Dept: PHYSICAL THERAPY | Facility: CLINIC | Age: 55
End: 2025-09-03
Payer: COMMERCIAL

## 2025-09-04 ENCOUNTER — TREATMENT (OUTPATIENT)
Dept: PHYSICAL THERAPY | Facility: CLINIC | Age: 55
End: 2025-09-04
Payer: COMMERCIAL

## 2025-09-04 DIAGNOSIS — M76.71 PERONEAL TENDONITIS, RIGHT: ICD-10-CM

## 2025-09-04 PROCEDURE — 97140 MANUAL THERAPY 1/> REGIONS: CPT | Mod: GP,CQ

## 2025-09-04 PROCEDURE — 97110 THERAPEUTIC EXERCISES: CPT | Mod: GP,CQ

## 2025-09-13 ENCOUNTER — APPOINTMENT (OUTPATIENT)
Dept: OPHTHALMOLOGY | Facility: CLINIC | Age: 55
End: 2025-09-13
Payer: COMMERCIAL